# Patient Record
Sex: FEMALE | Race: WHITE | NOT HISPANIC OR LATINO | Employment: OTHER | URBAN - METROPOLITAN AREA
[De-identification: names, ages, dates, MRNs, and addresses within clinical notes are randomized per-mention and may not be internally consistent; named-entity substitution may affect disease eponyms.]

---

## 2017-02-08 ENCOUNTER — ALLSCRIPTS OFFICE VISIT (OUTPATIENT)
Dept: OTHER | Facility: OTHER | Age: 57
End: 2017-02-08

## 2017-09-08 ENCOUNTER — OFFICE VISIT (OUTPATIENT)
Dept: URGENT CARE | Facility: CLINIC | Age: 57
End: 2017-09-08
Payer: COMMERCIAL

## 2017-09-08 ENCOUNTER — TRANSCRIBE ORDERS (OUTPATIENT)
Dept: URGENT CARE | Facility: CLINIC | Age: 57
End: 2017-09-08

## 2017-09-08 ENCOUNTER — APPOINTMENT (OUTPATIENT)
Dept: LAB | Facility: CLINIC | Age: 57
End: 2017-09-08
Payer: COMMERCIAL

## 2017-09-08 DIAGNOSIS — N39.0 URINARY TRACT INFECTION: ICD-10-CM

## 2017-09-08 PROCEDURE — G0382 LEV 3 HOSP TYPE B ED VISIT: HCPCS

## 2017-09-08 PROCEDURE — 87086 URINE CULTURE/COLONY COUNT: CPT

## 2017-09-08 PROCEDURE — 81002 URINALYSIS NONAUTO W/O SCOPE: CPT

## 2017-09-10 LAB — BACTERIA UR CULT: NORMAL

## 2017-12-07 ENCOUNTER — OFFICE VISIT (OUTPATIENT)
Dept: URGENT CARE | Facility: CLINIC | Age: 57
End: 2017-12-07
Payer: COMMERCIAL

## 2017-12-07 ENCOUNTER — APPOINTMENT (OUTPATIENT)
Dept: LAB | Facility: CLINIC | Age: 57
End: 2017-12-07
Payer: COMMERCIAL

## 2017-12-07 DIAGNOSIS — R30.0 DYSURIA: ICD-10-CM

## 2017-12-07 LAB
BILIRUB UR QL STRIP: NORMAL
CLARITY UR: NORMAL
COLOR UR: YELLOW
GLUCOSE (HISTORICAL): NORMAL
HGB UR QL STRIP.AUTO: NORMAL
KETONES UR STRIP-MCNC: NORMAL MG/DL
LEUKOCYTE ESTERASE UR QL STRIP: NORMAL
NITRITE UR QL STRIP: NORMAL
PH UR STRIP.AUTO: 6 [PH]
PROT UR STRIP-MCNC: NORMAL MG/DL
SP GR UR STRIP.AUTO: 1.02
UROBILINOGEN UR QL STRIP.AUTO: 0.2

## 2017-12-07 PROCEDURE — 81002 URINALYSIS NONAUTO W/O SCOPE: CPT

## 2017-12-07 PROCEDURE — 87077 CULTURE AEROBIC IDENTIFY: CPT

## 2017-12-07 PROCEDURE — 87186 SC STD MICRODIL/AGAR DIL: CPT

## 2017-12-07 PROCEDURE — G0382 LEV 3 HOSP TYPE B ED VISIT: HCPCS

## 2017-12-07 PROCEDURE — 87086 URINE CULTURE/COLONY COUNT: CPT

## 2017-12-09 LAB — BACTERIA UR CULT: ABNORMAL

## 2017-12-19 DIAGNOSIS — R92.2 INCONCLUSIVE MAMMOGRAM: ICD-10-CM

## 2017-12-19 DIAGNOSIS — Z12.31 ENCOUNTER FOR SCREENING MAMMOGRAM FOR MALIGNANT NEOPLASM OF BREAST: ICD-10-CM

## 2017-12-20 ENCOUNTER — GENERIC CONVERSION - ENCOUNTER (OUTPATIENT)
Dept: OBGYN CLINIC | Facility: CLINIC | Age: 57
End: 2017-12-20

## 2018-01-13 VITALS
WEIGHT: 157 LBS | SYSTOLIC BLOOD PRESSURE: 132 MMHG | DIASTOLIC BLOOD PRESSURE: 92 MMHG | HEIGHT: 66 IN | BODY MASS INDEX: 25.23 KG/M2

## 2018-01-23 VITALS
TEMPERATURE: 98.5 F | BODY MASS INDEX: 25.23 KG/M2 | SYSTOLIC BLOOD PRESSURE: 122 MMHG | HEIGHT: 66 IN | WEIGHT: 157 LBS | DIASTOLIC BLOOD PRESSURE: 86 MMHG | HEART RATE: 90 BPM | OXYGEN SATURATION: 96 % | RESPIRATION RATE: 18 BRPM

## 2018-02-06 PROBLEM — N39.0 ACUTE UTI: Status: ACTIVE | Noted: 2017-09-08

## 2018-02-06 PROBLEM — R30.0 DIFFICULT OR PAINFUL URINATION: Status: ACTIVE | Noted: 2017-12-07

## 2018-02-06 PROBLEM — R92.30 DENSE BREASTS: Status: ACTIVE | Noted: 2017-02-08

## 2018-02-06 PROBLEM — R92.2 DENSE BREASTS: Status: ACTIVE | Noted: 2017-02-08

## 2018-02-12 ENCOUNTER — OFFICE VISIT (OUTPATIENT)
Dept: OBGYN CLINIC | Facility: CLINIC | Age: 58
End: 2018-02-12
Payer: COMMERCIAL

## 2018-02-12 VITALS
DIASTOLIC BLOOD PRESSURE: 84 MMHG | WEIGHT: 161 LBS | BODY MASS INDEX: 25.88 KG/M2 | HEIGHT: 66 IN | SYSTOLIC BLOOD PRESSURE: 122 MMHG

## 2018-02-12 DIAGNOSIS — R92.2 DENSE BREAST TISSUE: ICD-10-CM

## 2018-02-12 DIAGNOSIS — Z12.31 ENCOUNTER FOR SCREENING MAMMOGRAM FOR MALIGNANT NEOPLASM OF BREAST: ICD-10-CM

## 2018-02-12 DIAGNOSIS — Z01.419 ENCOUNTER FOR GYNECOLOGICAL EXAMINATION (GENERAL) (ROUTINE) WITHOUT ABNORMAL FINDINGS: Primary | ICD-10-CM

## 2018-02-12 PROCEDURE — 99396 PREV VISIT EST AGE 40-64: CPT | Performed by: OBSTETRICS & GYNECOLOGY

## 2018-02-12 RX ORDER — ROSUVASTATIN CALCIUM 10 MG/1
10 TABLET, COATED ORAL DAILY
COMMUNITY
End: 2021-03-09 | Stop reason: SDUPTHER

## 2018-02-12 RX ORDER — BIOTIN 2500 MCG
CAPSULE ORAL
COMMUNITY

## 2018-02-12 RX ORDER — METOPROLOL SUCCINATE 25 MG/1
TABLET, EXTENDED RELEASE ORAL
Refills: 3 | COMMUNITY
Start: 2017-11-24 | End: 2019-02-27

## 2018-02-12 NOTE — PROGRESS NOTES
Assessment/Plan  Encounter Diagnoses   Name Primary?  Encounter for screening mammogram for malignant neoplasm of breast     Dense breast tissue     Encounter for gynecological examination (general) (routine) without abnormal findings Yes        Diagnoses and all orders for this visit:    Encounter for gynecological examination (general) (routine) without abnormal findings    Encounter for screening mammogram for malignant neoplasm of breast  -     Mammo screening bilateral w 3d & cad; Future    Dense breast tissue  -     Mammo screening bilateral w 3d & cad; Future    Other orders  -     ASPIRIN 81 PO; Take 81 mg by mouth  -     Biotin 2500 MCG CAPS; Take by mouth  -     Calcium Carbonate-Vitamin D (CALTRATE 600+D) 600-400 MG-UNIT per tablet; Take 1 tablet by mouth  -     Cholecalciferol 1000 units tablet; Take 6,000 Units by mouth  -     rosuvastatin (CRESTOR) 10 MG tablet; Take by mouth  -     metoprolol succinate (TOPROL-XL) 25 mg 24 hr tablet; TAKE 1 TABLET BY ORAL ROUTE DAILY  patient is here for annual gyn exam     She has no new problems   she has no menopausal symptoms  Subjective      Angie Bolanos is a 65 y o  female who presents for annual exam    The patient has no complaints today  She feels well  The patient is sexually active  GYN screening history: last pap: was normal    The patient is not taking hormone replacement therapy  Patient denies post-menopausal vaginal bleeding      The patient participates in regular exercise: no  She is safe   The patient is having menopausal symptoms vaginal dryness   She admits to seeing her primary care physician for exams and evaluations  Last mammogram was in 2017, last Pap smear was in 2016  Menstrual History:  OB History     No data available         Menarche age:   No LMP recorded         The following portions of the patient's history were reviewed and updated as appropriate: allergies, current medications, past family history, past medical history, past social history, past surgical history and problem list     Review of Systems  Patient Active Problem List    Diagnosis Date Noted    Difficult or painful urination 12/07/2017    Acute UTI 09/08/2017    Dense breasts 02/08/2017    Hyperlipidemia 11/06/2012    Hypertension 11/06/2012    Vitamin D deficiency 11/06/2012       Social History     Social History    Marital status: /Civil Union     Spouse name: N/A    Number of children: N/A    Years of education: N/A     Occupational History    Not on file       Social History Main Topics    Smoking status: Never Smoker    Smokeless tobacco: Never Used    Alcohol use No    Drug use: No    Sexual activity: Yes     Partners: Male     Birth control/ protection: Male Sterilization     Other Topics Concern    Not on file     Social History Narrative    No narrative on file     Family History   Problem Relation Age of Onset    Bone cancer Mother     Hypertension Mother     Multiple myeloma Mother     Dementia Father     Diabetes Father     Seizures Father     Heart disease Father     Hyperlipidemia Father     Breast cancer Maternal Aunt     Colon cancer Maternal Aunt     Breast cancer Paternal Aunt      Past Medical History:   Diagnosis Date    Abnormal Pap smear of cervix     Colon polyps     Fibroadenoma 1993    Forceps delivery     1994 daughter    Headache     HPV (human papilloma virus) infection     Mild dysplasia of cervix     SVT (supraventricular tachycardia) (HCC)     Uses oral contraceptives        Current Outpatient Prescriptions:     ASPIRIN 81 PO, Take 81 mg by mouth, Disp: , Rfl:     Biotin 2500 MCG CAPS, Take by mouth, Disp: , Rfl:     Calcium Carbonate-Vitamin D (CALTRATE 600+D) 600-400 MG-UNIT per tablet, Take 1 tablet by mouth, Disp: , Rfl:     Cholecalciferol 1000 units tablet, Take 6,000 Units by mouth, Disp: , Rfl:     metoprolol succinate (TOPROL-XL) 25 mg 24 hr tablet, TAKE 1 TABLET BY ORAL ROUTE DAILY  , Disp: , Rfl: 3    rosuvastatin (CRESTOR) 10 MG tablet, Take by mouth, Disp: , Rfl:     Objective      /84   Ht 5' 5 5" (1 664 m)   Wt 73 kg (161 lb)   BMI 26 38 kg/m²     normal nipple, no mass, no nipple discharge  General:   alert and oriented, in no acute distress   Heart: regular rate and rhythm, S1, S2 normal, no murmur, click, rub or gallop   Lungs: clear to auscultation bilaterally   Abdomen: soft, non-tender, without masses or organomegaly   Vulva: normal appearing vulva with no masses, tenderness, or lesions   Vagina: normal mucosa   Cervix: anteverted and no lesions   Uterus: normal size, non-tender   Adnexa: normal adnexa   not indicated and normal rectal, no masses    Lab Review  none        Plan:

## 2018-05-12 ENCOUNTER — OFFICE VISIT (OUTPATIENT)
Dept: URGENT CARE | Facility: CLINIC | Age: 58
End: 2018-05-12
Payer: COMMERCIAL

## 2018-05-12 VITALS
OXYGEN SATURATION: 98 % | SYSTOLIC BLOOD PRESSURE: 141 MMHG | TEMPERATURE: 97.8 F | HEART RATE: 79 BPM | DIASTOLIC BLOOD PRESSURE: 83 MMHG

## 2018-05-12 DIAGNOSIS — N30.00 ACUTE CYSTITIS WITHOUT HEMATURIA: Primary | ICD-10-CM

## 2018-05-12 LAB
SL AMB  POCT GLUCOSE, UA: ABNORMAL
SL AMB LEUKOCYTE ESTERASE,UA: ABNORMAL
SL AMB POCT BILIRUBIN,UA: NEGATIVE
SL AMB POCT BLOOD,UA: ABNORMAL
SL AMB POCT CLARITY,UA: ABNORMAL
SL AMB POCT COLOR,UA: ABNORMAL
SL AMB POCT KETONES,UA: NEGATIVE
SL AMB POCT NITRITE,UA: NEGATIVE
SL AMB POCT PH,UA: 5
SL AMB POCT SPECIFIC GRAVITY,UA: 1.02
SL AMB POCT URINE PROTEIN: 30
SL AMB POCT UROBILINOGEN: 0.2

## 2018-05-12 PROCEDURE — G0382 LEV 3 HOSP TYPE B ED VISIT: HCPCS | Performed by: PHYSICIAN ASSISTANT

## 2018-05-12 PROCEDURE — 87086 URINE CULTURE/COLONY COUNT: CPT | Performed by: PHYSICIAN ASSISTANT

## 2018-05-12 PROCEDURE — 81002 URINALYSIS NONAUTO W/O SCOPE: CPT | Performed by: PHYSICIAN ASSISTANT

## 2018-05-12 RX ORDER — SULFAMETHOXAZOLE AND TRIMETHOPRIM 800; 160 MG/1; MG/1
1 TABLET ORAL 2 TIMES DAILY
Qty: 14 TABLET | Refills: 0 | Status: SHIPPED | OUTPATIENT
Start: 2018-05-12 | End: 2018-05-19

## 2018-05-12 NOTE — PATIENT INSTRUCTIONS
Urinary Tract Infection in Women   AMBULATORY CARE:   A urinary tract infection (UTI)  is caused by bacteria that get inside your urinary tract  Most bacteria that enter your urinary tract come out when you urinate  If the bacteria stay in your urinary tract, you may get an infection  Your urinary tract includes your kidneys, ureters, bladder, and urethra  Urine is made in your kidneys, and it flows from the ureters to the bladder  Urine leaves the bladder through the urethra  A UTI is more common in your lower urinary tract, which includes your bladder and urethra  Common symptoms include the following:   · Urinating more often or waking from sleep to urinate    · Pain or burning when you urinate    · Pain or pressure in your lower abdomen     · Urine that smells bad    · Blood in your urine    · Leaking urine  Seek care immediately if:   · You are urinating very little or not at all  · You have a high fever with shaking chills  · You have side or back pain that gets worse  Contact your healthcare provider if:   · You have a fever  · You do not feel better after 2 days of taking antibiotics  · You are vomiting  · You have questions or concerns about your condition or care  Treatment for a UTI  may include medicines to treat a bacterial infection  You may also need medicines to decrease pain and burning, or decrease the urge to urinate often  Prevent a UTI:   · Empty your bladder often  Urinate and empty your bladder as soon as you feel the need  Do not hold your urine for long periods of time  · Wipe from front to back after you urinate or have a bowel movement  This will help prevent germs from getting into your urinary tract through your urethra  · Drink liquids as directed  Ask how much liquid to drink each day and which liquids are best for you  You may need to drink more liquids than usual to help flush out the bacteria  Do not drink alcohol, caffeine, or citrus juices  These can irritate your bladder and increase your symptoms  Your healthcare provider may recommend cranberry juice to help prevent a UTI  · Urinate after you have sex  This can help flush out bacteria passed during sex  · Do not douche or use feminine deodorants  These can change the chemical balance in your vagina  · Change sanitary pads or tampons often  This will help prevent germs from getting into your urinary tract  · Do pelvic muscle exercises often  Pelvic muscle exercises may help you start and stop urinating  Strong pelvic muscles may help you empty your bladder easier  Squeeze these muscles tightly for 5 seconds like you are trying to hold back urine  Then relax for 5 seconds  Gradually work up to squeezing for 10 seconds  Do 3 sets of 15 repetitions a day, or as directed  Follow up with your healthcare provider as directed:  Write down your questions so you remember to ask them during your visits  © 2017 [address] Information is for End User's use only and may not be sold, redistributed or otherwise used for commercial purposes  All illustrations and images included in CareNotes® are the copyrighted property of [employer]  or [employer]  The above information is an  only  It is not intended as medical advice for individual conditions or treatments  Talk to your doctor, nurse or pharmacist before following any medical regimen to see if it is safe and effective for you

## 2018-05-12 NOTE — PROGRESS NOTES
[facility]        NAME: Franny Ge is a 65 y o  female  : 1960    MRN: [id number]  DATE: May 12, 2018  TIME: 8:33 AM    Assessment and Plan   Acute cystitis without hematuria [N30 00]  1  Acute cystitis without hematuria  sulfamethoxazole-trimethoprim (BACTRIM DS) 800-160 mg per tablet         Patient Instructions       Follow up with PCP in 3-5 days  Proceed to  ER if symptoms worsen  Chief Complaint     Chief Complaint   Patient presents with    Possible UTI     noticed burning last night "this isnt my first rodeo with this"         History of Present Illness       Patient started with burning on urination last night  She is also having slight frequency no urgency  He states is typical when she starts a urinary tract infection  Patient denies any abdominal or flank pain fever chills  Patient states she took Bactrim last time which worked very well  She has not known any bacteria resistant to any specific antibiotics  Review of Systems   Review of Systems   Constitutional: Negative for chills and fever  HENT: Negative for rhinorrhea and sore throat  Eyes: Negative for redness  Respiratory: Negative for cough, shortness of breath and wheezing  Cardiovascular: Negative for chest pain  Gastrointestinal: Negative for abdominal pain  Genitourinary: Positive for dysuria and frequency  Negative for decreased urine volume, difficulty urinating, flank pain, hematuria, urgency and vaginal discharge  Musculoskeletal: Negative for myalgias  Neurological: Negative for dizziness and headaches  Hematological: Negative for adenopathy           Current Medications       Current Outpatient Prescriptions:     ASPIRIN 81 PO, Take 81 mg by mouth, Disp: , Rfl:     Biotin 2500 MCG CAPS, Take by mouth, Disp: , Rfl:     Calcium Carbonate-Vitamin D (CALTRATE 600+D) 600-400 MG-UNIT per tablet, Take 1 tablet by mouth, Disp: , Rfl:     Cholecalciferol 1000 units tablet, Take 6,000 Units by mouth, Disp: , Rfl:     metoprolol succinate (TOPROL-XL) 25 mg 24 hr tablet, TAKE 1 TABLET BY ORAL ROUTE DAILY  , Disp: , Rfl: 3    rosuvastatin (CRESTOR) 10 MG tablet, Take by mouth, Disp: , Rfl:     sulfamethoxazole-trimethoprim (BACTRIM DS) 800-160 mg per tablet, Take 1 tablet by mouth 2 (two) times a day for 7 days, Disp: 14 tablet, Rfl: 0    Current Allergies     Allergies as of 05/12/2018    (No Known Allergies)            The following portions of the patient's history were reviewed and updated as appropriate: allergies, current medications, past family history, past medical history, past social history, past surgical history and problem list      Past Medical History:   Diagnosis Date    Abnormal Pap smear of cervix     Colon polyps     Fibroadenoma 1993    Forceps delivery     1994 daughter    Headache     HPV (human papilloma virus) infection     Hyperlipemia     Hypertension     Mild dysplasia of cervix     SVT (supraventricular tachycardia) ([facility] [address] )     Urinary tract infection     Uses oral contraceptives        Past Surgical History:   Procedure Laterality Date    AV NODE ABLATION  2016    BREAST EXCISIONAL BIOPSY Right 1998    BREAST LUMPECTOMY Left 1981    CERVICAL CONE BIOPSY  1988    COLONOSCOPY  2013       Family History   Problem Relation Age of Onset    Bone cancer Mother     Hypertension Mother     Multiple myeloma Mother     Dementia Father     Diabetes Father     Seizures Father     Heart disease Father     Hyperlipidemia Father     Breast cancer Maternal Aunt     Colon cancer Maternal Aunt     Breast cancer Paternal Aunt          Medications have been verified  Objective   /83   Pulse 79   Temp 97 8 °F (36 6 °C) (Tympanic)   SpO2 98%        Physical Exam     Physical Exam   Constitutional: She is oriented to person, place, and time  She appears well-developed and well-nourished  HENT:   Head: Normocephalic and atraumatic     Eyes: Conjunctivae are normal    Neck: Normal range of motion  Cardiovascular: Normal rate and regular rhythm  Pulmonary/Chest: Effort normal    Abdominal: Soft  She exhibits no mass  There is no tenderness  No CVA tenderness   Neurological: She is alert and oriented to person, place, and time  Skin: Skin is warm and dry  Psychiatric: She has a normal mood and affect  Her behavior is normal  Judgment and thought content normal    Nursing note and vitals reviewed

## 2018-05-13 LAB — BACTERIA UR CULT: NORMAL

## 2018-05-17 ENCOUNTER — TELEPHONE (OUTPATIENT)
Dept: OBGYN CLINIC | Facility: CLINIC | Age: 58
End: 2018-05-17

## 2018-05-17 DIAGNOSIS — R10.2 PELVIC PAIN: Primary | ICD-10-CM

## 2018-05-17 NOTE — TELEPHONE ENCOUNTER
Spoke with pt - for the past 2 months, she has been having severe pelvic pain - sometimes it radiates to her hips  Sometimes that pain lets up a little when she take Advil that she is able to sleep but at times it dose not  No bleeding, but feels bloated  Was recently treated for a UTI, but states she had this before she got that  She would like an U/S  Ok to order?  Please

## 2018-05-17 NOTE — TELEPHONE ENCOUNTER
Pt called - currently having pelvic pain / discomfort - been happening for the past 2 mths - lower pelvis area - some times not as bad - but times where she can't sleep due to it - last 3-4 days - then goes away - but comes back - please advise  Home (1st)  [phone]  If no answer call    Cell  [phone]

## 2018-05-18 NOTE — TELEPHONE ENCOUNTER
Spoke with Pt today via phone call  Pt informed that radiology order for US pelvis complete w transvaginal completed in EPIC per Dr Panda Paris directive  Copy of said order mailed to Pt's mailing address listed in Pt's EHR per Pt's request   Reiterated to Pt that if she has any questions/concerns, to contact office

## 2018-05-18 NOTE — TELEPHONE ENCOUNTER
Carlos Manuel Rea MD   [facility] 19 minutes ago (8:13 AM)      Ok to order US (Routing comment)

## 2018-05-21 ENCOUNTER — HOSPITAL ENCOUNTER (OUTPATIENT)
Dept: ULTRASOUND IMAGING | Facility: HOSPITAL | Age: 58
Discharge: HOME | End: 2018-05-21
Payer: COMMERCIAL

## 2018-05-21 ENCOUNTER — TRANSCRIBE ORDERS (OUTPATIENT)
Dept: ADMINISTRATIVE | Facility: HOSPITAL | Age: 58
End: 2018-05-21

## 2018-05-21 DIAGNOSIS — R10.2 PELVIC PAIN: ICD-10-CM

## 2018-05-21 PROCEDURE — 76856 US EXAM PELVIC COMPLETE: CPT

## 2018-05-21 PROCEDURE — 76830 TRANSVAGINAL US NON-OB: CPT

## 2018-06-29 ENCOUNTER — APPOINTMENT (OUTPATIENT)
Dept: LAB | Facility: MEDICAL CENTER | Age: 58
End: 2018-06-29
Payer: COMMERCIAL

## 2018-06-29 ENCOUNTER — TRANSCRIBE ORDERS (OUTPATIENT)
Dept: LAB | Facility: MEDICAL CENTER | Age: 58
End: 2018-06-29

## 2018-06-29 DIAGNOSIS — M89.9 BONE DISEASE: ICD-10-CM

## 2018-06-29 DIAGNOSIS — I10 HYPERTENSION, UNSPECIFIED TYPE: ICD-10-CM

## 2018-06-29 DIAGNOSIS — R73.03 PREDIABETES: ICD-10-CM

## 2018-06-29 DIAGNOSIS — I49.9 CARDIAC ARRHYTHMIA, UNSPECIFIED CARDIAC ARRHYTHMIA TYPE: Primary | ICD-10-CM

## 2018-06-29 DIAGNOSIS — E78.5 HYPERLIPIDEMIA, UNSPECIFIED HYPERLIPIDEMIA TYPE: ICD-10-CM

## 2018-06-29 DIAGNOSIS — I49.9 CARDIAC ARRHYTHMIA, UNSPECIFIED CARDIAC ARRHYTHMIA TYPE: ICD-10-CM

## 2018-06-29 LAB
25(OH)D3 SERPL-MCNC: 46 NG/ML (ref 30–100)
ALBUMIN SERPL BCP-MCNC: 4 G/DL (ref 3.5–5)
ALP SERPL-CCNC: 74 U/L (ref 46–116)
ALT SERPL W P-5'-P-CCNC: 35 U/L (ref 12–78)
ANION GAP SERPL CALCULATED.3IONS-SCNC: 7 MMOL/L (ref 4–13)
AST SERPL W P-5'-P-CCNC: 17 U/L (ref 5–45)
BILIRUB SERPL-MCNC: 0.48 MG/DL (ref 0.2–1)
BUN SERPL-MCNC: 13 MG/DL (ref 5–25)
CALCIUM SERPL-MCNC: 9.6 MG/DL (ref 8.3–10.1)
CHLORIDE SERPL-SCNC: 107 MMOL/L (ref 100–108)
CHOLEST SERPL-MCNC: 209 MG/DL (ref 50–200)
CO2 SERPL-SCNC: 27 MMOL/L (ref 21–32)
CREAT SERPL-MCNC: 0.66 MG/DL (ref 0.6–1.3)
ERYTHROCYTE [DISTWIDTH] IN BLOOD BY AUTOMATED COUNT: 12.3 % (ref 11.6–15.1)
EST. AVERAGE GLUCOSE BLD GHB EST-MCNC: 131 MG/DL
GFR SERPL CREATININE-BSD FRML MDRD: 99 ML/MIN/1.73SQ M
GLUCOSE P FAST SERPL-MCNC: 116 MG/DL (ref 65–99)
HBA1C MFR BLD: 6.2 % (ref 4.2–6.3)
HCT VFR BLD AUTO: 41.6 % (ref 34.8–46.1)
HDLC SERPL-MCNC: 37 MG/DL (ref 40–60)
HGB BLD-MCNC: 13.6 G/DL (ref 11.5–15.4)
LDLC SERPL CALC-MCNC: 115 MG/DL (ref 0–100)
MCH RBC QN AUTO: 30.4 PG (ref 26.8–34.3)
MCHC RBC AUTO-ENTMCNC: 32.7 G/DL (ref 31.4–37.4)
MCV RBC AUTO: 93 FL (ref 82–98)
NONHDLC SERPL-MCNC: 172 MG/DL
PLATELET # BLD AUTO: 304 THOUSANDS/UL (ref 149–390)
PMV BLD AUTO: 11.4 FL (ref 8.9–12.7)
POTASSIUM SERPL-SCNC: 4.4 MMOL/L (ref 3.5–5.3)
PROT SERPL-MCNC: 8 G/DL (ref 6.4–8.2)
RBC # BLD AUTO: 4.47 MILLION/UL (ref 3.81–5.12)
SODIUM SERPL-SCNC: 141 MMOL/L (ref 136–145)
TRIGL SERPL-MCNC: 283 MG/DL
TSH SERPL DL<=0.05 MIU/L-ACNC: 1.31 UIU/ML (ref 0.36–3.74)
WBC # BLD AUTO: 6.76 THOUSAND/UL (ref 4.31–10.16)

## 2018-06-29 PROCEDURE — 82306 VITAMIN D 25 HYDROXY: CPT

## 2018-06-29 PROCEDURE — 80053 COMPREHEN METABOLIC PANEL: CPT

## 2018-06-29 PROCEDURE — 85027 COMPLETE CBC AUTOMATED: CPT

## 2018-06-29 PROCEDURE — 83036 HEMOGLOBIN GLYCOSYLATED A1C: CPT

## 2018-06-29 PROCEDURE — 80061 LIPID PANEL: CPT

## 2018-06-29 PROCEDURE — 84443 ASSAY THYROID STIM HORMONE: CPT

## 2018-06-29 PROCEDURE — 36415 COLL VENOUS BLD VENIPUNCTURE: CPT

## 2018-08-22 ENCOUNTER — TELEPHONE (OUTPATIENT)
Dept: OBGYN CLINIC | Facility: CLINIC | Age: 58
End: 2018-08-22

## 2018-08-22 DIAGNOSIS — Z12.31 ENCOUNTER FOR SCREENING MAMMOGRAM FOR MALIGNANT NEOPLASM OF BREAST: Primary | ICD-10-CM

## 2018-08-22 NOTE — TELEPHONE ENCOUNTER
Pt called in regards to [facility] script  Pt has script for a mammo but needs the dr ricardo   [facility] Please mail to pt

## 2018-09-02 ENCOUNTER — OFFICE VISIT (OUTPATIENT)
Dept: URGENT CARE | Facility: CLINIC | Age: 58
End: 2018-09-02
Payer: COMMERCIAL

## 2018-09-02 VITALS
OXYGEN SATURATION: 96 % | HEART RATE: 80 BPM | SYSTOLIC BLOOD PRESSURE: 156 MMHG | RESPIRATION RATE: 18 BRPM | DIASTOLIC BLOOD PRESSURE: 78 MMHG | TEMPERATURE: 98.4 F

## 2018-09-02 DIAGNOSIS — R35.0 URINARY FREQUENCY: ICD-10-CM

## 2018-09-02 DIAGNOSIS — N30.00 ACUTE CYSTITIS WITHOUT HEMATURIA: Primary | ICD-10-CM

## 2018-09-02 LAB
SL AMB  POCT GLUCOSE, UA: ABNORMAL
SL AMB LEUKOCYTE ESTERASE,UA: ABNORMAL
SL AMB POCT BILIRUBIN,UA: ABNORMAL
SL AMB POCT BLOOD,UA: ABNORMAL
SL AMB POCT CLARITY,UA: CLEAR
SL AMB POCT COLOR,UA: YELLOW
SL AMB POCT KETONES,UA: ABNORMAL
SL AMB POCT NITRITE,UA: ABNORMAL
SL AMB POCT PH,UA: 7
SL AMB POCT SPECIFIC GRAVITY,UA: 1.01
SL AMB POCT URINE PROTEIN: ABNORMAL
SL AMB POCT UROBILINOGEN: 0.2

## 2018-09-02 PROCEDURE — 81002 URINALYSIS NONAUTO W/O SCOPE: CPT | Performed by: PHYSICIAN ASSISTANT

## 2018-09-02 PROCEDURE — 87086 URINE CULTURE/COLONY COUNT: CPT | Performed by: PHYSICIAN ASSISTANT

## 2018-09-02 PROCEDURE — G0382 LEV 3 HOSP TYPE B ED VISIT: HCPCS | Performed by: PHYSICIAN ASSISTANT

## 2018-09-02 RX ORDER — NITROFURANTOIN 25; 75 MG/1; MG/1
100 CAPSULE ORAL 2 TIMES DAILY
Qty: 14 CAPSULE | Refills: 0 | Status: SHIPPED | OUTPATIENT
Start: 2018-09-02 | End: 2018-09-09

## 2018-09-02 NOTE — PROGRESS NOTES
[facility]        NAME: Oleg Strong is a 65 y o  female  : 1960    MRN: [id number]  DATE: 2018  TIME: 12:52 PM    Assessment and Plan   Acute cystitis without hematuria [N30 00]  1  Acute cystitis without hematuria  nitrofurantoin (MACROBID) 100 mg capsule   2  Urinary frequency  POCT urine dip    Urine culture         Patient Instructions     Suggest following up with urologist   Follow up with PCP in 3-5 days  Proceed to  ER if symptoms worsen  Chief Complaint     Chief Complaint   Patient presents with    Urinary Frequency     Pt c/o urianry frequency and burning upon urination since this morning  History of Present Illness       Patient presents with a 2 day history of burning on urination urinary pressure and frequency  She had last UTI was several months ago  Denies any fever chills or back pain  Review of Systems   Review of Systems   Constitutional: Negative for chills and fever  HENT: Negative for sore throat  Eyes: Negative for redness  Respiratory: Negative for cough  Cardiovascular: Negative for chest pain  Gastrointestinal: Negative for abdominal pain, diarrhea, nausea and vomiting  Genitourinary: Positive for dysuria and frequency  Negative for difficulty urinating, flank pain, hematuria and vaginal pain  Musculoskeletal: Negative for arthralgias  Skin: Negative for rash  Neurological: Negative for headaches  Hematological: Negative for adenopathy  Current Medications       Current Outpatient Prescriptions:     ASPIRIN 81 PO, Take 81 mg by mouth, Disp: , Rfl:     Biotin 2500 MCG CAPS, Take by mouth, Disp: , Rfl:     Calcium Carbonate-Vitamin D (CALTRATE 600+D) 600-400 MG-UNIT per tablet, Take 1 tablet by mouth, Disp: , Rfl:     Cholecalciferol 1000 units tablet, Take 6,000 Units by mouth, Disp: , Rfl:     metoprolol succinate (TOPROL-XL) 25 mg 24 hr tablet, TAKE 1 TABLET BY ORAL ROUTE DAILY  , Disp: , Rfl: 3   nitrofurantoin (MACROBID) 100 mg capsule, Take 1 capsule (100 mg total) by mouth 2 (two) times a day for 7 days, Disp: 14 capsule, Rfl: 0    rosuvastatin (CRESTOR) 10 MG tablet, Take by mouth, Disp: , Rfl:     Current Allergies     Allergies as of 09/02/2018    (No Known Allergies)            The following portions of the patient's history were reviewed and updated as appropriate: allergies, current medications, past family history, past medical history, past social history, past surgical history and problem list      Past Medical History:   Diagnosis Date    Abnormal Pap smear of cervix     Colon polyps     Fibroadenoma 1993    Forceps delivery     1994 daughter    Headache     HPV (human papilloma virus) infection     Hyperlipemia     Hypertension     Mild dysplasia of cervix     SVT [facility]ricular tachycardia) ([facility] [address]     Urinary tract infection     Uses oral contraceptives        Past Surgical History:   Procedure Laterality Date    AV NODE ABLATION  2016    BREAST EXCISIONAL BIOPSY Right 1998    BREAST LUMPECTOMY Left 1981    CERVICAL CONE BIOPSY  1988    COLONOSCOPY  2013       Family History   Problem Relation Age of Onset    Bone cancer Mother     Hypertension Mother     Multiple myeloma Mother     Dementia Father     Diabetes Father     Seizures Father     Heart disease Father     Hyperlipidemia Father     Breast cancer Maternal Aunt     Colon cancer Maternal Aunt     Breast cancer Paternal Aunt          Medications have been verified  Objective   /78   Pulse 80   Temp 98 4 °F (36 9 °C)   Resp 18   SpO2 96%        Physical Exam     Physical Exam   Constitutional: She is oriented to person, place, and time  She appears well-developed and well-nourished  HENT:   Head: Normocephalic and atraumatic  Eyes: Conjunctivae are normal    Neck: Normal range of motion  Cardiovascular: Normal rate and regular rhythm      Pulmonary/Chest: Effort normal    Abdominal: Soft  She exhibits no mass  There is no tenderness  No CVA tenderness   Neurological: She is alert and oriented to person, place, and time  Skin: Skin is warm and dry  No rash noted  Psychiatric: She has a normal mood and affect   Her behavior is normal  Thought content normal

## 2018-09-02 NOTE — PATIENT INSTRUCTIONS
Urinary Tract Infection in Women   AMBULATORY CARE:   A urinary tract infection (UTI)  is caused by bacteria that get inside your urinary tract  Most bacteria that enter your urinary tract come out when you urinate  If the bacteria stay in your urinary tract, you may get an infection  Your urinary tract includes your kidneys, ureters, bladder, and urethra  Urine is made in your kidneys, and it flows from the ureters to the bladder  Urine leaves the bladder through the urethra  A UTI is more common in your lower urinary tract, which includes your bladder and urethra  Common symptoms include the following:   · Urinating more often or waking from sleep to urinate    · Pain or burning when you urinate    · Pain or pressure in your lower abdomen     · Urine that smells bad    · Blood in your urine    · Leaking urine  Seek care immediately if:   · You are urinating very little or not at all  · You have a high fever with shaking chills  · You have side or back pain that gets worse  Contact your healthcare provider if:   · You have a fever  · You do not feel better after 2 days of taking antibiotics  · You are vomiting  · You have questions or concerns about your condition or care  Treatment for a UTI  may include medicines to treat a bacterial infection  You may also need medicines to decrease pain and burning, or decrease the urge to urinate often  Prevent a UTI:   · Empty your bladder often  Urinate and empty your bladder as soon as you feel the need  Do not hold your urine for long periods of time  · Wipe from front to back after you urinate or have a bowel movement  This will help prevent germs from getting into your urinary tract through your urethra  · Drink liquids as directed  Ask how much liquid to drink each day and which liquids are best for you  You may need to drink more liquids than usual to help flush out the bacteria  Do not drink alcohol, caffeine, or citrus juices  These can irritate your bladder and increase your symptoms  Your healthcare provider may recommend cranberry juice to help prevent a UTI  · Urinate after you have sex  This can help flush out bacteria passed during sex  · Do not douche or use feminine deodorants  These can change the chemical balance in your vagina  · Change sanitary pads or tampons often  This will help prevent germs from getting into your urinary tract  · Do pelvic muscle exercises often  Pelvic muscle exercises may help you start and stop urinating  Strong pelvic muscles may help you empty your bladder easier  Squeeze these muscles tightly for 5 seconds like you are trying to hold back urine  Then relax for 5 seconds  Gradually work up to squeezing for 10 seconds  Do 3 sets of 15 repetitions a day, or as directed  Follow up with your healthcare provider as directed:  Write down your questions so you remember to ask them during your visits  © 2017 [address] [employer] Information is for End User's use only and may not be sold, redistributed or otherwise used for commercial purposes  All illustrations and images included in CareNotes® are the copyrighted property of [employer]  or [employer]  The above information is an  only  It is not intended as medical advice for individual conditions or treatments  Talk to your doctor, nurse or pharmacist before following any medical regimen to see if it is safe and effective for you

## 2018-09-03 LAB — BACTERIA UR CULT: NORMAL

## 2018-10-29 ENCOUNTER — TRANSCRIBE ORDERS (OUTPATIENT)
Dept: LAB | Facility: MEDICAL CENTER | Age: 58
End: 2018-10-29

## 2018-10-29 ENCOUNTER — APPOINTMENT (OUTPATIENT)
Dept: LAB | Facility: MEDICAL CENTER | Age: 58
End: 2018-10-29
Payer: COMMERCIAL

## 2018-10-29 DIAGNOSIS — R73.03 PREDIABETES: ICD-10-CM

## 2018-10-29 DIAGNOSIS — E78.5 HYPERLIPIDEMIA, UNSPECIFIED HYPERLIPIDEMIA TYPE: ICD-10-CM

## 2018-10-29 DIAGNOSIS — E78.5 HYPERLIPIDEMIA, UNSPECIFIED HYPERLIPIDEMIA TYPE: Primary | ICD-10-CM

## 2018-10-29 LAB
CHOLEST SERPL-MCNC: 152 MG/DL (ref 50–200)
EST. AVERAGE GLUCOSE BLD GHB EST-MCNC: 123 MG/DL
HBA1C MFR BLD: 5.9 % (ref 4.2–6.3)
HDLC SERPL-MCNC: 36 MG/DL (ref 40–60)
LDLC SERPL CALC-MCNC: 71 MG/DL (ref 0–100)
NONHDLC SERPL-MCNC: 116 MG/DL
TRIGL SERPL-MCNC: 224 MG/DL

## 2018-10-29 PROCEDURE — 83036 HEMOGLOBIN GLYCOSYLATED A1C: CPT

## 2018-10-29 PROCEDURE — 80061 LIPID PANEL: CPT

## 2018-10-29 PROCEDURE — 36415 COLL VENOUS BLD VENIPUNCTURE: CPT

## 2018-11-16 ENCOUNTER — APPOINTMENT (OUTPATIENT)
Dept: LAB | Facility: MEDICAL CENTER | Age: 58
End: 2018-11-16
Payer: COMMERCIAL

## 2018-11-16 ENCOUNTER — TRANSCRIBE ORDERS (OUTPATIENT)
Dept: LAB | Facility: MEDICAL CENTER | Age: 58
End: 2018-11-16

## 2018-11-16 DIAGNOSIS — R31.9 URINARY TRACT INFECTION WITH HEMATURIA, SITE UNSPECIFIED: Primary | ICD-10-CM

## 2018-11-16 DIAGNOSIS — N39.0 URINARY TRACT INFECTION WITH HEMATURIA, SITE UNSPECIFIED: ICD-10-CM

## 2018-11-16 DIAGNOSIS — R31.9 URINARY TRACT INFECTION WITH HEMATURIA, SITE UNSPECIFIED: ICD-10-CM

## 2018-11-16 DIAGNOSIS — N39.0 URINARY TRACT INFECTION WITH HEMATURIA, SITE UNSPECIFIED: Primary | ICD-10-CM

## 2018-11-16 PROCEDURE — 87186 SC STD MICRODIL/AGAR DIL: CPT

## 2018-11-16 PROCEDURE — 87077 CULTURE AEROBIC IDENTIFY: CPT

## 2018-11-16 PROCEDURE — 87086 URINE CULTURE/COLONY COUNT: CPT

## 2018-11-18 LAB
BACTERIA UR CULT: ABNORMAL
BACTERIA UR CULT: ABNORMAL

## 2018-11-30 ENCOUNTER — TRANSCRIBE ORDERS (OUTPATIENT)
Dept: LAB | Facility: MEDICAL CENTER | Age: 58
End: 2018-11-30

## 2018-11-30 ENCOUNTER — APPOINTMENT (OUTPATIENT)
Dept: LAB | Facility: MEDICAL CENTER | Age: 58
End: 2018-11-30
Payer: COMMERCIAL

## 2018-11-30 DIAGNOSIS — N39.0 URINARY TRACT INFECTION WITH HEMATURIA, SITE UNSPECIFIED: Primary | ICD-10-CM

## 2018-11-30 DIAGNOSIS — N39.0 URINARY TRACT INFECTION WITH HEMATURIA, SITE UNSPECIFIED: ICD-10-CM

## 2018-11-30 DIAGNOSIS — R31.9 URINARY TRACT INFECTION WITH HEMATURIA, SITE UNSPECIFIED: Primary | ICD-10-CM

## 2018-11-30 DIAGNOSIS — R31.9 URINARY TRACT INFECTION WITH HEMATURIA, SITE UNSPECIFIED: ICD-10-CM

## 2018-11-30 LAB
BACTERIA UR QL AUTO: ABNORMAL /HPF
BILIRUB UR QL STRIP: NEGATIVE
CLARITY UR: CLEAR
COLOR UR: YELLOW
GLUCOSE UR STRIP-MCNC: NEGATIVE MG/DL
HGB UR QL STRIP.AUTO: NEGATIVE
HYALINE CASTS #/AREA URNS LPF: ABNORMAL /LPF
KETONES UR STRIP-MCNC: NEGATIVE MG/DL
LEUKOCYTE ESTERASE UR QL STRIP: ABNORMAL
NITRITE UR QL STRIP: NEGATIVE
NON-SQ EPI CELLS URNS QL MICRO: ABNORMAL /HPF
PH UR STRIP.AUTO: 6.5 [PH] (ref 4.5–8)
PROT UR STRIP-MCNC: NEGATIVE MG/DL
RBC #/AREA URNS AUTO: ABNORMAL /HPF
SP GR UR STRIP.AUTO: 1.02 (ref 1–1.03)
UROBILINOGEN UR QL STRIP.AUTO: 0.2 E.U./DL
WBC #/AREA URNS AUTO: ABNORMAL /HPF

## 2018-11-30 PROCEDURE — 87147 CULTURE TYPE IMMUNOLOGIC: CPT

## 2018-11-30 PROCEDURE — 87086 URINE CULTURE/COLONY COUNT: CPT

## 2018-11-30 PROCEDURE — 81001 URINALYSIS AUTO W/SCOPE: CPT

## 2018-12-02 LAB
BACTERIA UR CULT: ABNORMAL
BACTERIA UR CULT: ABNORMAL

## 2019-02-20 ENCOUNTER — TRANSCRIBE ORDERS (OUTPATIENT)
Dept: LAB | Facility: MEDICAL CENTER | Age: 59
End: 2019-02-20

## 2019-02-20 ENCOUNTER — APPOINTMENT (OUTPATIENT)
Dept: LAB | Facility: MEDICAL CENTER | Age: 59
End: 2019-02-20
Payer: COMMERCIAL

## 2019-02-20 DIAGNOSIS — R30.0 DYSURIA: Primary | ICD-10-CM

## 2019-02-20 PROCEDURE — 87086 URINE CULTURE/COLONY COUNT: CPT

## 2019-02-20 PROCEDURE — 87186 SC STD MICRODIL/AGAR DIL: CPT

## 2019-02-20 PROCEDURE — 87077 CULTURE AEROBIC IDENTIFY: CPT

## 2019-02-22 LAB — BACTERIA UR CULT: ABNORMAL

## 2019-02-27 ENCOUNTER — ANNUAL EXAM (OUTPATIENT)
Dept: OBGYN CLINIC | Facility: CLINIC | Age: 59
End: 2019-02-27
Payer: COMMERCIAL

## 2019-02-27 VITALS — BODY MASS INDEX: 25.56 KG/M2 | DIASTOLIC BLOOD PRESSURE: 84 MMHG | WEIGHT: 156 LBS | SYSTOLIC BLOOD PRESSURE: 130 MMHG

## 2019-02-27 DIAGNOSIS — Z12.39 SCREENING FOR MALIGNANT NEOPLASM OF BREAST: ICD-10-CM

## 2019-02-27 DIAGNOSIS — Z01.419 ENCOUNTER FOR GYNECOLOGICAL EXAMINATION (GENERAL) (ROUTINE) WITHOUT ABNORMAL FINDINGS: Primary | ICD-10-CM

## 2019-02-27 PROBLEM — N39.0 ACUTE UTI: Status: RESOLVED | Noted: 2017-09-08 | Resolved: 2019-02-27

## 2019-02-27 PROBLEM — R92.30 DENSE BREASTS: Status: RESOLVED | Noted: 2017-02-08 | Resolved: 2019-02-27

## 2019-02-27 PROBLEM — R92.2 DENSE BREASTS: Status: RESOLVED | Noted: 2017-02-08 | Resolved: 2019-02-27

## 2019-02-27 PROBLEM — R30.0 DIFFICULT OR PAINFUL URINATION: Status: RESOLVED | Noted: 2017-12-07 | Resolved: 2019-02-27

## 2019-02-27 PROCEDURE — 99396 PREV VISIT EST AGE 40-64: CPT | Performed by: PHYSICIAN ASSISTANT

## 2019-02-27 RX ORDER — LEVOFLOXACIN 500 MG/1
500 TABLET, FILM COATED ORAL DAILY
Refills: 0 | COMMUNITY
Start: 2019-02-20 | End: 2019-12-26

## 2019-02-27 RX ORDER — METOPROLOL SUCCINATE 50 MG/1
50 TABLET, EXTENDED RELEASE ORAL DAILY
Refills: 5 | COMMUNITY
Start: 2019-01-10 | End: 2019-07-17 | Stop reason: SDUPTHER

## 2019-02-27 NOTE — PROGRESS NOTES
Assessment/Plan  Problem List Items Addressed This Visit        Other    Encounter for gynecological examination (general) (routine) without abnormal findings - Primary     Annual exam performed  mammo rx given  RTO 1 year           Other Visit Diagnoses     Screening for malignant neoplasm of breast        Relevant Orders    Mammo screening bilateral w cad        Subjective   Pancho Amador is a 63 y o  female who presents for annual GYN exam  She denies any changes in Medical, Surgical or Family histories  No menses, and she denies postmenopausal bleeding, spotting, or discharge  She reports that she is sexually active with 1 partner  She denies any pain or dryness with intercourse  Last Pap smear: 2016  Regular self breast exam: yes  Last mammogram: 2018  Last Colonoscopy: 2016  Family history of uterine or ovarian cancer: yes - maternal aunt - ovarian  Family history of breast cancer: yes - maternal aunt and paternal aunt  Family history of colon cancer: yes - maternal aunt x 2    Menstrual History:  OB History        1    Para   1    Term                AB        Living           SAB        TAB        Ectopic        Multiple        Live Births                    No LMP recorded  Patient is postmenopausal        The following portions of the patient's history were reviewed and updated as appropriate: allergies, current medications, past family history, past medical history, past social history, past surgical history and problem list     Review of Systems  Review of Systems   Constitutional: Negative for chills and fever  Respiratory: Negative for shortness of breath  Cardiovascular: Negative for chest pain  Gastrointestinal: Negative for abdominal pain  Genitourinary: Negative for dysuria, pelvic pain, vaginal bleeding, vaginal discharge and vaginal pain  Negative for breast pain or lumps  Negative for stress urinary incontinence  Neurological: Negative for headaches  Objective   /84 (BP Location: Right arm)   Wt 70 8 kg (156 lb)   BMI 25 56 kg/m²     Physical Exam   Constitutional: She is oriented to person, place, and time  She appears well-developed and well-nourished  Neck: No thyromegaly present  Cardiovascular: Normal rate and regular rhythm  Pulmonary/Chest: Effort normal and breath sounds normal  Right breast exhibits no mass, no nipple discharge, no skin change and no tenderness  Left breast exhibits no mass, no nipple discharge, no skin change and no tenderness  Abdominal: Soft  There is no tenderness  There is no rebound and no guarding  Genitourinary: There is no rash or lesion on the right labia  There is no rash or lesion on the left labia  Uterus is not enlarged and not tender  Cervix exhibits no motion tenderness and no discharge  Right adnexum displays no mass and no tenderness  Left adnexum displays no mass and no tenderness  No bleeding in the vagina  No vaginal discharge found  Neurological: She is alert and oriented to person, place, and time  Psychiatric: She has a normal mood and affect  Nursing note and vitals reviewed

## 2019-03-06 ENCOUNTER — APPOINTMENT (OUTPATIENT)
Dept: LAB | Facility: MEDICAL CENTER | Age: 59
End: 2019-03-06
Payer: COMMERCIAL

## 2019-03-06 ENCOUNTER — TRANSCRIBE ORDERS (OUTPATIENT)
Dept: LAB | Facility: MEDICAL CENTER | Age: 59
End: 2019-03-06

## 2019-03-06 DIAGNOSIS — R30.0 DYSURIA: Primary | ICD-10-CM

## 2019-03-06 LAB

## 2019-03-06 PROCEDURE — 87086 URINE CULTURE/COLONY COUNT: CPT

## 2019-03-06 PROCEDURE — 81001 URINALYSIS AUTO W/SCOPE: CPT

## 2019-03-07 LAB — BACTERIA UR CULT: NORMAL

## 2019-04-30 ENCOUNTER — TRANSCRIBE ORDERS (OUTPATIENT)
Dept: LAB | Facility: MEDICAL CENTER | Age: 59
End: 2019-04-30

## 2019-04-30 ENCOUNTER — APPOINTMENT (OUTPATIENT)
Dept: LAB | Facility: MEDICAL CENTER | Age: 59
End: 2019-04-30
Payer: COMMERCIAL

## 2019-04-30 DIAGNOSIS — R80.9 PROTEINURIA, UNSPECIFIED TYPE: ICD-10-CM

## 2019-04-30 DIAGNOSIS — R80.9 PROTEINURIA, UNSPECIFIED TYPE: Primary | ICD-10-CM

## 2019-04-30 LAB
ANION GAP SERPL CALCULATED.3IONS-SCNC: 8 MMOL/L (ref 4–13)
BUN SERPL-MCNC: 13 MG/DL (ref 5–25)
CALCIUM SERPL-MCNC: 9 MG/DL (ref 8.3–10.1)
CHLORIDE SERPL-SCNC: 108 MMOL/L (ref 100–108)
CHOLEST SERPL-MCNC: 181 MG/DL (ref 50–200)
CO2 SERPL-SCNC: 27 MMOL/L (ref 21–32)
CREAT SERPL-MCNC: 0.64 MG/DL (ref 0.6–1.3)
EST. AVERAGE GLUCOSE BLD GHB EST-MCNC: 120 MG/DL
GFR SERPL CREATININE-BSD FRML MDRD: 99 ML/MIN/1.73SQ M
GLUCOSE P FAST SERPL-MCNC: 112 MG/DL (ref 65–99)
HBA1C MFR BLD: 5.8 % (ref 4.2–6.3)
HDLC SERPL-MCNC: 45 MG/DL (ref 40–60)
LDLC SERPL CALC-MCNC: 103 MG/DL (ref 0–100)
NONHDLC SERPL-MCNC: 136 MG/DL
POTASSIUM SERPL-SCNC: 4.3 MMOL/L (ref 3.5–5.3)
SODIUM SERPL-SCNC: 143 MMOL/L (ref 136–145)
TRIGL SERPL-MCNC: 166 MG/DL

## 2019-04-30 PROCEDURE — 80061 LIPID PANEL: CPT

## 2019-04-30 PROCEDURE — 83036 HEMOGLOBIN GLYCOSYLATED A1C: CPT

## 2019-04-30 PROCEDURE — 36415 COLL VENOUS BLD VENIPUNCTURE: CPT

## 2019-04-30 PROCEDURE — 80048 BASIC METABOLIC PNL TOTAL CA: CPT

## 2019-05-01 ENCOUNTER — OFFICE VISIT (OUTPATIENT)
Dept: CARDIOLOGY CLINIC | Facility: CLINIC | Age: 59
End: 2019-05-01
Payer: COMMERCIAL

## 2019-05-01 VITALS
HEART RATE: 72 BPM | SYSTOLIC BLOOD PRESSURE: 126 MMHG | BODY MASS INDEX: 25.71 KG/M2 | HEIGHT: 66 IN | DIASTOLIC BLOOD PRESSURE: 92 MMHG | WEIGHT: 160 LBS

## 2019-05-01 DIAGNOSIS — G50.9 TRIGEMINAL NERVE DISORDER: ICD-10-CM

## 2019-05-01 DIAGNOSIS — R07.89 OTHER CHEST PAIN: ICD-10-CM

## 2019-05-01 DIAGNOSIS — I47.1 SUPRAVENTRICULAR TACHYCARDIA (HCC): Primary | ICD-10-CM

## 2019-05-01 DIAGNOSIS — E78.2 MIXED HYPERLIPIDEMIA: ICD-10-CM

## 2019-05-01 DIAGNOSIS — I10 ESSENTIAL HYPERTENSION: ICD-10-CM

## 2019-05-01 PROCEDURE — 99214 OFFICE O/P EST MOD 30 MIN: CPT | Performed by: PHYSICIAN ASSISTANT

## 2019-05-01 PROCEDURE — 93000 ELECTROCARDIOGRAM COMPLETE: CPT | Performed by: PHYSICIAN ASSISTANT

## 2019-05-06 ENCOUNTER — HOSPITAL ENCOUNTER (OUTPATIENT)
Dept: CT IMAGING | Facility: HOSPITAL | Age: 59
Discharge: HOME | End: 2019-05-06
Payer: COMMERCIAL

## 2019-05-06 DIAGNOSIS — G50.9 TRIGEMINAL NERVE DISORDER: ICD-10-CM

## 2019-05-06 PROCEDURE — 70470 CT HEAD/BRAIN W/O & W/DYE: CPT

## 2019-05-06 RX ADMIN — IOHEXOL 100 ML: 350 INJECTION, SOLUTION INTRAVENOUS at 13:56

## 2019-05-12 ENCOUNTER — OFFICE VISIT (OUTPATIENT)
Dept: URGENT CARE | Facility: CLINIC | Age: 59
End: 2019-05-12
Payer: COMMERCIAL

## 2019-05-12 VITALS
RESPIRATION RATE: 18 BRPM | HEART RATE: 77 BPM | SYSTOLIC BLOOD PRESSURE: 156 MMHG | DIASTOLIC BLOOD PRESSURE: 82 MMHG | TEMPERATURE: 97.8 F | OXYGEN SATURATION: 97 %

## 2019-05-12 DIAGNOSIS — R31.9 URINARY TRACT INFECTION WITH HEMATURIA, SITE UNSPECIFIED: Primary | ICD-10-CM

## 2019-05-12 DIAGNOSIS — R30.0 DYSURIA: ICD-10-CM

## 2019-05-12 DIAGNOSIS — N39.0 URINARY TRACT INFECTION WITH HEMATURIA, SITE UNSPECIFIED: Primary | ICD-10-CM

## 2019-05-12 LAB
SL AMB  POCT GLUCOSE, UA: ABNORMAL
SL AMB LEUKOCYTE ESTERASE,UA: ABNORMAL
SL AMB POCT BILIRUBIN,UA: ABNORMAL
SL AMB POCT BLOOD,UA: ABNORMAL
SL AMB POCT CLARITY,UA: ABNORMAL
SL AMB POCT COLOR,UA: YELLOW
SL AMB POCT KETONES,UA: ABNORMAL
SL AMB POCT NITRITE,UA: ABNORMAL
SL AMB POCT PH,UA: 6
SL AMB POCT SPECIFIC GRAVITY,UA: 1.01
SL AMB POCT URINE PROTEIN: 30
SL AMB POCT UROBILINOGEN: 0.2

## 2019-05-12 PROCEDURE — 81002 URINALYSIS NONAUTO W/O SCOPE: CPT | Performed by: NURSE PRACTITIONER

## 2019-05-12 PROCEDURE — G0382 LEV 3 HOSP TYPE B ED VISIT: HCPCS | Performed by: NURSE PRACTITIONER

## 2019-05-12 PROCEDURE — 87086 URINE CULTURE/COLONY COUNT: CPT | Performed by: NURSE PRACTITIONER

## 2019-05-12 RX ORDER — NITROFURANTOIN 25; 75 MG/1; MG/1
100 CAPSULE ORAL 2 TIMES DAILY
Qty: 14 CAPSULE | Refills: 0 | Status: SHIPPED | OUTPATIENT
Start: 2019-05-12 | End: 2019-05-19

## 2019-05-13 LAB — BACTERIA UR CULT: NORMAL

## 2019-05-24 ENCOUNTER — HOSPITAL ENCOUNTER (OUTPATIENT)
Dept: NON INVASIVE DIAGNOSTICS | Facility: CLINIC | Age: 59
Discharge: HOME | End: 2019-05-24
Payer: COMMERCIAL

## 2019-05-24 DIAGNOSIS — R07.89 OTHER CHEST PAIN: ICD-10-CM

## 2019-05-24 PROCEDURE — 93306 TTE W/DOPPLER COMPLETE: CPT

## 2019-05-24 PROCEDURE — 93306 TTE W/DOPPLER COMPLETE: CPT | Performed by: INTERNAL MEDICINE

## 2019-07-17 DIAGNOSIS — I10 ESSENTIAL HYPERTENSION: Primary | ICD-10-CM

## 2019-07-17 RX ORDER — METOPROLOL SUCCINATE 50 MG/1
50 TABLET, EXTENDED RELEASE ORAL DAILY
Qty: 90 TABLET | Refills: 3 | Status: SHIPPED | OUTPATIENT
Start: 2019-07-17 | End: 2020-07-08

## 2019-08-06 ENCOUNTER — TRANSCRIBE ORDERS (OUTPATIENT)
Dept: LAB | Facility: MEDICAL CENTER | Age: 59
End: 2019-08-06

## 2019-08-06 ENCOUNTER — TRANSCRIBE ORDERS (OUTPATIENT)
Dept: ADMINISTRATIVE | Facility: HOSPITAL | Age: 59
End: 2019-08-06

## 2019-08-06 ENCOUNTER — APPOINTMENT (OUTPATIENT)
Dept: LAB | Facility: MEDICAL CENTER | Age: 59
End: 2019-08-06
Payer: COMMERCIAL

## 2019-08-06 DIAGNOSIS — L98.9 LESION OF FINGER: ICD-10-CM

## 2019-08-06 DIAGNOSIS — N39.0 RECURRENT UTI: ICD-10-CM

## 2019-08-06 DIAGNOSIS — R20.0 FACIAL NUMBNESS: ICD-10-CM

## 2019-08-06 DIAGNOSIS — N39.0 RECURRENT UTI: Primary | ICD-10-CM

## 2019-08-06 DIAGNOSIS — R20.0 FACIAL NUMBNESS: Primary | ICD-10-CM

## 2019-08-06 LAB
BACTERIA UR QL AUTO: ABNORMAL /HPF
BILIRUB UR QL STRIP: NEGATIVE
CLARITY UR: ABNORMAL
COLOR UR: YELLOW
GLUCOSE UR STRIP-MCNC: NEGATIVE MG/DL
HGB UR QL STRIP.AUTO: NEGATIVE
HYALINE CASTS #/AREA URNS LPF: ABNORMAL /LPF
KETONES UR STRIP-MCNC: NEGATIVE MG/DL
LEUKOCYTE ESTERASE UR QL STRIP: ABNORMAL
NITRITE UR QL STRIP: NEGATIVE
NON-SQ EPI CELLS URNS QL MICRO: ABNORMAL /HPF
PH UR STRIP.AUTO: 6 [PH]
PROT UR STRIP-MCNC: NEGATIVE MG/DL
RBC #/AREA URNS AUTO: ABNORMAL /HPF
SP GR UR STRIP.AUTO: 1.02 (ref 1–1.03)
UROBILINOGEN UR QL STRIP.AUTO: 0.2 E.U./DL
WBC #/AREA URNS AUTO: ABNORMAL /HPF

## 2019-08-06 PROCEDURE — 87086 URINE CULTURE/COLONY COUNT: CPT

## 2019-08-06 PROCEDURE — 81001 URINALYSIS AUTO W/SCOPE: CPT

## 2019-08-07 LAB — BACTERIA UR CULT: NORMAL

## 2019-08-08 ENCOUNTER — HOSPITAL ENCOUNTER (OUTPATIENT)
Dept: ULTRASOUND IMAGING | Facility: HOSPITAL | Age: 59
Discharge: HOME | End: 2019-08-08
Payer: COMMERCIAL

## 2019-08-08 ENCOUNTER — TRANSCRIBE ORDERS (OUTPATIENT)
Dept: ADMINISTRATIVE | Facility: HOSPITAL | Age: 59
End: 2019-08-08

## 2019-08-08 ENCOUNTER — HOSPITAL ENCOUNTER (OUTPATIENT)
Dept: RADIOLOGY | Facility: HOSPITAL | Age: 59
Discharge: HOME | End: 2019-08-08
Payer: COMMERCIAL

## 2019-08-08 DIAGNOSIS — M79.644 PAIN IN FINGER OF RIGHT HAND: Primary | ICD-10-CM

## 2019-08-08 DIAGNOSIS — N39.0 RECURRENT UTI: ICD-10-CM

## 2019-08-08 PROCEDURE — 76770 US EXAM ABDO BACK WALL COMP: CPT

## 2019-08-08 PROCEDURE — 73140 X-RAY EXAM OF FINGER(S): CPT

## 2019-08-13 PROBLEM — R39.9 UTI SYMPTOMS: Status: ACTIVE | Noted: 2019-08-13

## 2019-08-13 NOTE — PROGRESS NOTES
8/14/2019      Chief Complaint   Patient presents with    Urinary Tract Infection       Assessment and Plan    63 y o  female     1  UTI symptoms  - reviewed the patient's urine culture data with her, there are a few low level e coli cultures but mainly mixed contaminants and no growth  - discussed behavioral modifications with the patient including proper hydration and avoidance of excess amounts of bladder irritants  - have provided her oxybutynin immediate release 5 mg to take as needed during her flares  - if she is not feeling better on the oxybutynin and has continued UTI symptoms there are standing urine orders in the computer for her to obtained, she will notify our office if she drops a sample off at the lab    Follow-up 6 months for re-evaluation      History of Present Illness  Hiro Carranza is a 63 y o  female here for initial evaluation of urinary tract infection symptoms  The patient states she has had many UTIs over the past couple years  Urine culture data reveals mainly mixed contaminants but there are 2 cultures revealing very low levels of E coli  The patient states her typical symptoms are mid back pain, suprapubic discomfort, and dysuria at the end of her stream   She has gone through menopause and denies any dyspareunia  She does have occasional constipation but does not believe her UTI symptoms are ever correlated with this  She drinks mainly water throughout the day with 1 coffee only in the a m  [facility] Review of Systems   Constitutional: Negative for activity change, chills and fever  Gastrointestinal: Negative for abdominal distention and abdominal pain  Musculoskeletal: Negative for back pain and gait problem  Psychiatric/Behavioral: Negative for behavioral problems and confusion  Urinary Incontinence Screening      Most Recent Value   Urinary Incontinence   Urinary Incontinence? No   Incomplete emptying? Yes   Urinary frequency? No   Urinary urgency?   No   Urinary hesitancy? No   Dysuria (painful difficult urination)? No   Nocturia (waking up to use the bathroom)? No   Straining (having to push to go)? No   Weak stream?  No   Intermittent stream?  Yes   Post void dribbling? No          Past Medical History  Past Medical History:   Diagnosis Date    Abnormal Pap smear of cervix     Colon polyps     Fibroadenoma 1993    Forceps delivery     1994 daughter    Headache     History of cardiac monitoring 05/13/2006    Flutter in variety of situations  She did not feel any of the PVCs or PACs    History of echocardiogram 03/11/2016    EF 55%, mild TR    History of nuclear stress test 04/18/2006    Adenosine MPI:  Normal EKG response  No ischemia  Probable soft tissue attenuation  No definite evidence of scar      HPV (human papilloma virus) infection     Hyperlipemia     Hypertension     Mild dysplasia of cervix     Palpitations     SVT (supraventricular tachycardia) (HCC)     Urinary tract infection     Uses oral contraceptives        Past Social History  Past Surgical History:   Procedure Laterality Date    AV NODE ABLATION  05/10/2016    s/p successful AV iván ablation    BREAST EXCISIONAL BIOPSY Right 1998    BREAST LUMPECTOMY Left 1981    CERVICAL CONE BIOPSY  1988    COLONOSCOPY  2013     Social History     Tobacco Use   Smoking Status Never Smoker   Smokeless Tobacco Never Used       Past Family History  Family History   Problem Relation Age of Onset    Bone cancer Mother     Hypertension Mother     Multiple myeloma Mother     Dementia Father     Diabetes Father     Seizures Father     Heart disease Father         MI in his 62s    Hyperlipidemia Father     Breast cancer Maternal Aunt     Colon cancer Maternal Aunt     Breast cancer Paternal Aunt        Past Social history  Social History     Socioeconomic History    Marital status: /Civil Union     Spouse name: Not on file    Number of children: Not on file    Years of education: Not on file    Highest education level: Not on file   Occupational History    Not on file   Social Needs    Financial resource strain: Not on file    Food insecurity:     Worry: Not on file     Inability: Not on file    Transportation needs:     Medical: Not on file     Non-medical: Not on file   Tobacco Use    Smoking status: Never Smoker    Smokeless tobacco: Never Used   Substance and Sexual Activity    Alcohol use: Yes     Comment: rare    Drug use: No    Sexual activity: Yes     Partners: Male     Birth control/protection: Male Sterilization   Lifestyle    Physical activity:     Days per week: Not on file     Minutes per session: Not on file    Stress: Not on file   Relationships    Social connections:     Talks on phone: Not on file     Gets together: Not on file     Attends Anabaptist service: Not on file     Active member of club or organization: Not on file     Attends meetings of clubs or organizations: Not on file     Relationship status: Not on file    Intimate partner violence:     Fear of current or ex partner: Not on file     Emotionally abused: Not on file     Physically abused: Not on file     Forced sexual activity: Not on file   Other Topics Concern    Not on file   Social History Narrative    Not on file       Current Medications  Current Outpatient Medications   Medication Sig Dispense Refill    ASPIRIN 81 PO Take 81 mg by mouth daily       Biotin 2500 MCG CAPS Take by mouth      Calcium Carbonate-Vitamin D (CALTRATE 600+D) 600-400 MG-UNIT per tablet Take 1 tablet by mouth      Cholecalciferol 1000 units tablet Take 6,000 Units by mouth      levofloxacin (LEVAQUIN) 500 mg tablet Take 500 mg by mouth daily  0    metoprolol succinate (TOPROL-XL) 50 mg 24 hr tablet Take 1 tablet (50 mg total) by mouth daily 90 tablet 3    Probiotic Product (PROBIOTIC-10 PO) Take by mouth      rosuvastatin (CRESTOR) 10 MG tablet Take 10 mg by mouth daily       oxybutynin (DITROPAN) 5 mg tablet Take 1 tablet (5 mg total) by mouth 3 (three) times a day as needed (bladder pain) 30 tablet 5     No current facility-administered medications for this visit  Allergies  No Known Allergies      The following portions of the patient's history were reviewed and updated as appropriate: allergies, current medications, past medical history, past social history, past surgical history and problem list       Vitals  Vitals:    08/14/19 0856   BP: 140/98   Pulse: 76   Weight: 73 kg (161 lb)         Physical Exam  Constitutional   General appearance: Patient is seated and in no acute distress, well appearing and well nourished  Head and Face   Head and face: Normal     Eyes   Conjunctiva and lids: No erythema, swelling or discharge  Ears, Nose, Mouth, and Throat   Hearing: Normal     Pulmonary   Respiratory effort: No increased work of breathing or signs of respiratory distress  Cardiovascular   Examination of extremities for edema and/or varicosities: Normal     Abdomen   Abdomen: Non-tender, no masses  Musculoskeletal   Gait and station: Normal     Skin   Skin and subcutaneous tissue: Warm, dry, and intact  No visible lesions or rashes    Psychiatric   Judgment and insight: Normal  Recent and remote memory:  Normal  Mood and affect: Normal      Results  Recent Results (from the past 1 hour(s))   POCT urine dip    Collection Time: 08/14/19  9:06 AM   Result Value Ref Range    LEUKOCYTE ESTERASE,UA -     NITRITE,UA -     SL AMB POCT UROBILINOGEN 0 2     POCT URINE PROTEIN -      PH,UA 5 0     BLOOD,UA -     SPECIFIC GRAVITY,UA 1 020     KETONES,UA -     BILIRUBIN,UA -     GLUCOSE, UA -      COLOR,UA yellow     CLARITY,UA clear    ]  No results found for: PSA  Lab Results   Component Value Date    GLUCOSE 113 04/05/2014    CALCIUM 9 0 04/30/2019     04/05/2014    K 4 3 04/30/2019    CO2 27 04/30/2019     04/30/2019    BUN 13 04/30/2019    CREATININE 0 64 04/30/2019     Lab Results Component Value Date    WBC 6 76 06/29/2018    HGB 13 6 06/29/2018    HCT 41 6 06/29/2018    MCV 93 06/29/2018     06/29/2018       Orders  Orders Placed This Encounter   Procedures    Urine culture     Standing Status:   Future     Standing Expiration Date:   8/14/2020    Urinalysis with microscopic     Standing Status:   Future     Standing Expiration Date:   8/14/2020    POCT urine dip

## 2019-08-14 ENCOUNTER — OFFICE VISIT (OUTPATIENT)
Dept: UROLOGY | Facility: MEDICAL CENTER | Age: 59
End: 2019-08-14
Payer: COMMERCIAL

## 2019-08-14 VITALS
WEIGHT: 161 LBS | HEART RATE: 76 BPM | BODY MASS INDEX: 25.99 KG/M2 | DIASTOLIC BLOOD PRESSURE: 98 MMHG | SYSTOLIC BLOOD PRESSURE: 140 MMHG

## 2019-08-14 DIAGNOSIS — R39.9 UTI SYMPTOMS: Primary | ICD-10-CM

## 2019-08-14 LAB
SL AMB  POCT GLUCOSE, UA: NORMAL
SL AMB LEUKOCYTE ESTERASE,UA: NORMAL
SL AMB POCT BILIRUBIN,UA: NORMAL
SL AMB POCT BLOOD,UA: NORMAL
SL AMB POCT CLARITY,UA: CLEAR
SL AMB POCT COLOR,UA: YELLOW
SL AMB POCT KETONES,UA: NORMAL
SL AMB POCT NITRITE,UA: NORMAL
SL AMB POCT PH,UA: 5
SL AMB POCT SPECIFIC GRAVITY,UA: 1.02
SL AMB POCT URINE PROTEIN: NORMAL
SL AMB POCT UROBILINOGEN: 0.2

## 2019-08-14 PROCEDURE — 81002 URINALYSIS NONAUTO W/O SCOPE: CPT | Performed by: PHYSICIAN ASSISTANT

## 2019-08-14 PROCEDURE — 99202 OFFICE O/P NEW SF 15 MIN: CPT | Performed by: PHYSICIAN ASSISTANT

## 2019-08-14 RX ORDER — OXYBUTYNIN CHLORIDE 5 MG/1
5 TABLET ORAL 3 TIMES DAILY PRN
Qty: 30 TABLET | Refills: 5 | Status: SHIPPED | OUTPATIENT
Start: 2019-08-14 | End: 2020-03-04 | Stop reason: ALTCHOICE

## 2019-11-01 ENCOUNTER — APPOINTMENT (OUTPATIENT)
Dept: LAB | Facility: CLINIC | Age: 59
End: 2019-11-01
Payer: COMMERCIAL

## 2019-11-01 ENCOUNTER — TRANSCRIBE ORDERS (OUTPATIENT)
Dept: LAB | Facility: CLINIC | Age: 59
End: 2019-11-01

## 2019-11-01 DIAGNOSIS — R20.2 PARESTHESIA: ICD-10-CM

## 2019-11-01 DIAGNOSIS — M89.9 BONE DISEASE: ICD-10-CM

## 2019-11-01 DIAGNOSIS — R51.9 NONINTRACTABLE HEADACHE, UNSPECIFIED CHRONICITY PATTERN, UNSPECIFIED HEADACHE TYPE: ICD-10-CM

## 2019-11-01 DIAGNOSIS — R73.03 BORDERLINE DIABETIC: Primary | ICD-10-CM

## 2019-11-01 DIAGNOSIS — E78.5 HYPERLIPIDEMIA, UNSPECIFIED HYPERLIPIDEMIA TYPE: ICD-10-CM

## 2019-11-01 DIAGNOSIS — I49.9 CARDIAC ARRHYTHMIA, UNSPECIFIED CARDIAC ARRHYTHMIA TYPE: ICD-10-CM

## 2019-11-01 LAB
25(OH)D3 SERPL-MCNC: 48.4 NG/ML (ref 30–100)
ALBUMIN SERPL BCP-MCNC: 4.7 G/DL (ref 3.5–5)
ALP SERPL-CCNC: 78 U/L (ref 46–116)
ALT SERPL W P-5'-P-CCNC: 31 U/L (ref 12–78)
ANION GAP SERPL CALCULATED.3IONS-SCNC: 8 MMOL/L (ref 4–13)
AST SERPL W P-5'-P-CCNC: 17 U/L (ref 5–45)
BILIRUB SERPL-MCNC: 0.62 MG/DL (ref 0.2–1)
BUN SERPL-MCNC: 13 MG/DL (ref 5–25)
CALCIUM SERPL-MCNC: 9.8 MG/DL (ref 8.3–10.1)
CHLORIDE SERPL-SCNC: 106 MMOL/L (ref 100–108)
CHOLEST SERPL-MCNC: 221 MG/DL (ref 50–200)
CO2 SERPL-SCNC: 27 MMOL/L (ref 21–32)
CREAT SERPL-MCNC: 0.75 MG/DL (ref 0.6–1.3)
ERYTHROCYTE [DISTWIDTH] IN BLOOD BY AUTOMATED COUNT: 12.2 % (ref 11.6–15.1)
EST. AVERAGE GLUCOSE BLD GHB EST-MCNC: 126 MG/DL
FOLATE SERPL-MCNC: >20 NG/ML (ref 3.1–17.5)
GFR SERPL CREATININE-BSD FRML MDRD: 88 ML/MIN/1.73SQ M
GLUCOSE P FAST SERPL-MCNC: 131 MG/DL (ref 65–99)
HBA1C MFR BLD: 6 % (ref 4.2–6.3)
HCT VFR BLD AUTO: 40.8 % (ref 34.8–46.1)
HDLC SERPL-MCNC: 40 MG/DL
HGB BLD-MCNC: 13.4 G/DL (ref 11.5–15.4)
LDLC SERPL CALC-MCNC: 122 MG/DL (ref 0–100)
MCH RBC QN AUTO: 30.5 PG (ref 26.8–34.3)
MCHC RBC AUTO-ENTMCNC: 32.8 G/DL (ref 31.4–37.4)
MCV RBC AUTO: 93 FL (ref 82–98)
NONHDLC SERPL-MCNC: 181 MG/DL
PLATELET # BLD AUTO: 313 THOUSANDS/UL (ref 149–390)
PMV BLD AUTO: 10.9 FL (ref 8.9–12.7)
POTASSIUM SERPL-SCNC: 4.2 MMOL/L (ref 3.5–5.3)
PROT SERPL-MCNC: 8.4 G/DL (ref 6.4–8.2)
RBC # BLD AUTO: 4.4 MILLION/UL (ref 3.81–5.12)
SODIUM SERPL-SCNC: 141 MMOL/L (ref 136–145)
T4 FREE SERPL-MCNC: 0.82 NG/DL (ref 0.76–1.46)
TRIGL SERPL-MCNC: 294 MG/DL
TSH SERPL DL<=0.05 MIU/L-ACNC: 2 UIU/ML (ref 0.36–3.74)
VIT B12 SERPL-MCNC: 364 PG/ML (ref 100–900)
WBC # BLD AUTO: 6.57 THOUSAND/UL (ref 4.31–10.16)

## 2019-11-01 PROCEDURE — 84443 ASSAY THYROID STIM HORMONE: CPT

## 2019-11-01 PROCEDURE — 85027 COMPLETE CBC AUTOMATED: CPT

## 2019-11-01 PROCEDURE — 82746 ASSAY OF FOLIC ACID SERUM: CPT

## 2019-11-01 PROCEDURE — 82306 VITAMIN D 25 HYDROXY: CPT

## 2019-11-01 PROCEDURE — 36415 COLL VENOUS BLD VENIPUNCTURE: CPT

## 2019-11-01 PROCEDURE — 80053 COMPREHEN METABOLIC PANEL: CPT

## 2019-11-01 PROCEDURE — 84439 ASSAY OF FREE THYROXINE: CPT

## 2019-11-01 PROCEDURE — 82607 VITAMIN B-12: CPT

## 2019-11-01 PROCEDURE — 80061 LIPID PANEL: CPT

## 2019-11-01 PROCEDURE — 83036 HEMOGLOBIN GLYCOSYLATED A1C: CPT

## 2019-12-02 ENCOUNTER — CLINICAL SUPPORT (OUTPATIENT)
Dept: NUTRITION | Facility: HOSPITAL | Age: 59
End: 2019-12-02
Payer: COMMERCIAL

## 2019-12-02 VITALS — WEIGHT: 162.4 LBS | BODY MASS INDEX: 25.49 KG/M2 | HEIGHT: 67 IN

## 2019-12-02 DIAGNOSIS — E78.2 MIXED HYPERLIPIDEMIA: ICD-10-CM

## 2019-12-02 PROCEDURE — 97802 MEDICAL NUTRITION INDIV IN: CPT

## 2019-12-02 NOTE — PROGRESS NOTES
Initial Nutrition Assessment Form    Patient Name: Shayne Seay    YOB: 1960    Sex: Female     Assessment Date: 12/2/2019  Start Time: 0910 Stop Time: 1010 Total Minutes: 61     Data:  Present at session: self   Parent Concerns:    Medical Dx/Reason for Referral: Diabetes/hyperlipidemia   Past Medical History:   Diagnosis Date    Abnormal Pap smear of cervix     Colon polyps     Fibroadenoma 1993    Forceps delivery     1994 daughter    Headache     History of cardiac monitoring 05/13/2006    Flutter in variety of situations  She did not feel any of the PVCs or PACs    History of echocardiogram 03/11/2016    EF 55%, mild TR    History of nuclear stress test 04/18/2006    Adenosine MPI:  Normal EKG response  No ischemia  Probable soft tissue attenuation  No definite evidence of scar   HPV (human papilloma virus) infection     Hyperlipemia     Hypertension     Mild dysplasia of cervix     Palpitations     SVT (supraventricular tachycardia) (HCC)     Urinary tract infection     Uses oral contraceptives        Current Outpatient Medications   Medication Sig Dispense Refill    ASPIRIN 81 PO Take 81 mg by mouth daily       Biotin 2500 MCG CAPS Take by mouth      Calcium Carbonate-Vitamin D (CALTRATE 600+D) 600-400 MG-UNIT per tablet Take 1 tablet by mouth      Cholecalciferol 1000 units tablet Take 6,000 Units by mouth      levofloxacin (LEVAQUIN) 500 mg tablet Take 500 mg by mouth daily  0    metoprolol succinate (TOPROL-XL) 50 mg 24 hr tablet Take 1 tablet (50 mg total) by mouth daily 90 tablet 3    oxybutynin (DITROPAN) 5 mg tablet Take 1 tablet (5 mg total) by mouth 3 (three) times a day as needed (bladder pain) 30 tablet 5    Probiotic Product (PROBIOTIC-10 PO) Take by mouth      rosuvastatin (CRESTOR) 10 MG tablet Take 10 mg by mouth daily        No current facility-administered medications for this visit           Additional Meds/Supplements: Peter red 4 in 1, cranberry, biotin, Vit D, crestor   Special Learning Needs: none   Height: HC Readings from Last 3 Encounters:   No data found for [facility]       Weight: Wt Readings from Last 3 Encounters:   08/14/19 73 kg (161 lb)   05/01/19 72 6 kg (160 lb)   02/27/19 70 8 kg (156 lb)     There is no height or weight on file to calculate BMI  Recent Weight Change: []Yes     [x]No  Amount: Weight stable over last 3 years; prior to senior living, wt was 155#      Energy Needs: 1800 calories/225 g carbs/12 g saturated fat   No Known Allergies    Social History     Substance and Sexual Activity   Alcohol Use Yes    Comment: rare       Social History     Tobacco Use   Smoking Status Never Smoker   Smokeless Tobacco Never Used       Who shops? patient   Who cooks? patient   Exercise: walk   Prior Counseling? []Yes     [x]No  When:    Why:         Diet Hx:  Breakfast: 8-830 a m  Oatmeal, cinnamon, walnuts, banana, coffee with sugar free [place] vanilla, 2 oz juice; eats out 2 times months for breakfast   Lunch: 1200 p m  [place] yogurt,  mozzarelle cheese, fresh fruit, soup, 1/2 sandwich, tuna with light pappas       Dinner: 430-5 p m     Cooked meal with protein, lean meat and vegetable, rare starch intake and portions monitor by patient       Snacks: AM - 1 piece dove chocolate square, nuts or blueberries  PM - 1 piece dove chocolate square, popcorn  HS - 1 piece dove chocolate square, sugar free pudding        Nutrition Diagnosis:   Excessive fat intake  related to Food and nutrition-related knowledge deficit concerning appropriate amount of dietary fat as evidenced by  Cholesterol >200 mg/dL , LDL cholesterol >100 mg/dL, HDL cholesterol <40 mg/dL, triglycerides >150 mg/dL       Medical Nutrition Therapy Intervention:  [x]Individualized Meal Plan [x]Understanding Lab Values   []Basic Pathophysiology of Disease []Food/Medication Interactions   []Food Diary [x]Exercise   [x]Lifestyle/Behavior Modification Techniques []Medication, Mechanism of Action [x]Label Reading []Self Blood Glucose Monitoring   []Weight/BMI Goals []Other -    Other Notes:  Patient states she is mindful of her diet and does try to watch her intake        Comprehension: []Excellent  []Very Good  [x]Good  []Fair   []Poor    Receptivity: []Excellent  []Very Good  [x]Good  []Fair   []Poor    Expected Compliance: []Excellent  []Very Good  [x]Good  []Fair   []Poor        Goals:  1  Carb counting with goal range <225 g daily and saturated fat <12 g daily   2  Increase fiber intake >25 g daily   3  Increase intensity of exercise; interval training daily with exercise       Follow up to be determined    Labs:  CMP  Lab Results   Component Value Date     04/05/2014    K 4 2 11/01/2019     11/01/2019    CO2 27 11/01/2019    ANIONGAP 12 04/05/2014    BUN 13 11/01/2019    CREATININE 0 75 11/01/2019    GLUCOSE 113 04/05/2014    GLUF 131 (H) 11/01/2019    CALCIUM 9 8 11/01/2019    AST 17 11/01/2019    ALT 31 11/01/2019    ALKPHOS 78 11/01/2019    PROT 7 7 04/05/2014    BILITOT 0 4 04/05/2014    EGFR 88 11/01/2019       BMP  Lab Results   Component Value Date    GLUCOSE 113 04/05/2014    CALCIUM 9 8 11/01/2019     04/05/2014    K 4 2 11/01/2019    CO2 27 11/01/2019     11/01/2019    BUN 13 11/01/2019    CREATININE 0 75 11/01/2019       Lipids  Lab Results   Component Value Date    CHOL 223 07/24/2015    CHOL 201 04/05/2014     Lab Results   Component Value Date    HDL 40 11/01/2019    HDL 45 04/30/2019    HDL 36 (L) 10/29/2018     Lab Results   Component Value Date    LDLCALC 122 (H) 11/01/2019    LDLCALC 103 (H) 04/30/2019    LDLCALC 71 10/29/2018     Lab Results   Component Value Date    TRIG 294 (H) 11/01/2019    TRIG 166 (H) 04/30/2019    TRIG 224 (H) 10/29/2018     No results found for: CHOLHDL    Hemoglobin A1C  Lab Results   Component Value Date    HGBA1C 6 0 11/01/2019       Fasting Glucose  Lab Results   Component Value Date    GLUF 131 (H) 11/01/2019       Insulin Thyroid  No results found for: TSH, P9QFGNO, K5VSBZT, THYROIDAB    Hepatic Function Panel  Lab Results   Component Value Date    ALT 31 11/01/2019    AST 17 11/01/2019    ALKPHOS 78 11/01/2019    BILITOT 0 4 04/05/2014       Celiac Disease Antibody Panel  No results found for: ENDOMYSIAL IGA, GLIADIN IGA, GLIADIN IGG, IGA, TISSUE TRANSGLUT AB, TTG IGA   Iron  No results found for: IRON, TIBC, FERRITIN    Vitamins  No results found for: VITAMIN B2   No results found for: NICOTINAMIDE, NICOTINIC ACID   No results found for: VITAMINB6  Lab Results   Component Value Date    YCAVGUOP44 947 11/01/2019     No results found for: VITB5  No results found for: V3WASOED  No results found for: THYROGLB  No results found for: VITAMIN K   No results found for: 25-HYDROXY VIT D   No components found for: [facility] MA,RD,LDN,CDE  [address] [address]

## 2019-12-26 ENCOUNTER — OFFICE VISIT (OUTPATIENT)
Dept: URGENT CARE | Facility: CLINIC | Age: 59
End: 2019-12-26
Payer: COMMERCIAL

## 2019-12-26 VITALS
DIASTOLIC BLOOD PRESSURE: 90 MMHG | RESPIRATION RATE: 18 BRPM | TEMPERATURE: 99.4 F | HEIGHT: 67 IN | WEIGHT: 162 LBS | HEART RATE: 80 BPM | SYSTOLIC BLOOD PRESSURE: 132 MMHG | OXYGEN SATURATION: 97 % | BODY MASS INDEX: 25.43 KG/M2

## 2019-12-26 DIAGNOSIS — J01.90 ACUTE SINUSITIS, RECURRENCE NOT SPECIFIED, UNSPECIFIED LOCATION: Primary | ICD-10-CM

## 2019-12-26 PROCEDURE — G0382 LEV 3 HOSP TYPE B ED VISIT: HCPCS | Performed by: PHYSICIAN ASSISTANT

## 2019-12-26 RX ORDER — FLUTICASONE PROPIONATE 50 MCG
1 SPRAY, SUSPENSION (ML) NASAL DAILY
Qty: 16 G | Refills: 0 | Status: SHIPPED | OUTPATIENT
Start: 2019-12-26 | End: 2020-02-18 | Stop reason: ALTCHOICE

## 2019-12-26 RX ORDER — PREDNISONE 20 MG/1
40 TABLET ORAL DAILY
Qty: 10 TABLET | Refills: 0 | Status: SHIPPED | OUTPATIENT
Start: 2019-12-26 | End: 2019-12-31

## 2019-12-26 RX ORDER — AMOXICILLIN AND CLAVULANATE POTASSIUM 875; 125 MG/1; MG/1
1 TABLET, FILM COATED ORAL EVERY 12 HOURS SCHEDULED
Qty: 20 TABLET | Refills: 0 | Status: SHIPPED | OUTPATIENT
Start: 2019-12-26 | End: 2020-01-05

## 2019-12-26 NOTE — PROGRESS NOTES
[facility]        NAME: Carlon Schaumann is a 63 y o  female  : 1960    MRN: [id number]  DATE: 2019  TIME: 9:49 AM    Assessment and Plan   Acute sinusitis, recurrence not specified, unspecified location [J01 90]  1  Acute sinusitis, recurrence not specified, unspecified location  predniSONE 20 mg tablet    amoxicillin-clavulanate (AUGMENTIN) 875-125 mg per tablet    fluticasone (FLONASE) 50 mcg/act nasal spray         Patient Instructions       Follow up with PCP in 3-5 days  Proceed to  ER if symptoms worsen  Chief Complaint     Chief Complaint   Patient presents with    Cold Like Symptoms     C/O head congestion, sinus pressure x 3 days  History of Present Illness       60-year-old female presents for evaluation of sinus pain and pressure  Patient complaining of symptoms for 3-4 days  Patient having worse pain on the left side of her face  Patient complains of purulent nasal drainage  Complains of a slight cough  Denies any fever chills  Review of Systems   Review of Systems   Constitutional: Negative for chills, fatigue and fever  HENT: Positive for congestion, sinus pressure and sinus pain  Negative for ear pain, sore throat and trouble swallowing  Eyes: Negative for pain, discharge and redness  Respiratory: Positive for cough  Negative for chest tightness, shortness of breath and wheezing  Cardiovascular: Negative for chest pain, palpitations and leg swelling  Gastrointestinal: Negative for abdominal pain, diarrhea, nausea and vomiting  Musculoskeletal: Negative for arthralgias, joint swelling and myalgias  Skin: Negative for rash  Neurological: Negative for dizziness, weakness, numbness and headaches           Current Medications       Current Outpatient Medications:     amoxicillin-clavulanate (AUGMENTIN) 875-125 mg per tablet, Take 1 tablet by mouth every 12 (twelve) hours for 10 days, Disp: 20 tablet, Rfl: 0    ASPIRIN 81 PO, Take 81 mg by mouth daily , Disp: , Rfl:     Biotin 2500 MCG CAPS, Take by mouth, Disp: , Rfl:     Calcium Carbonate-Vitamin D (CALTRATE 600+D) 600-400 MG-UNIT per tablet, Take 1 tablet by mouth, Disp: , Rfl:     Cholecalciferol 1000 units tablet, Take 6,000 Units by mouth, Disp: , Rfl:     fluticasone (FLONASE) 50 mcg/act nasal spray, 1 spray into each nostril daily, Disp: 16 g, Rfl: 0    metoprolol succinate (TOPROL-XL) 50 mg 24 hr tablet, Take 1 tablet (50 mg total) by mouth daily, Disp: 90 tablet, Rfl: 3    oxybutynin (DITROPAN) 5 mg tablet, Take 1 tablet (5 mg total) by mouth 3 (three) times a day as needed (bladder pain), Disp: 30 tablet, Rfl: 5    predniSONE 20 mg tablet, Take 2 tablets (40 mg total) by mouth daily for 5 days, Disp: 10 tablet, Rfl: 0    rosuvastatin (CRESTOR) 10 MG tablet, Take 10 mg by mouth daily , Disp: , Rfl:     Current Allergies     Allergies as of 12/26/2019    (No Known Allergies)            The following portions of the patient's history were reviewed and updated as appropriate: allergies, current medications, past family history, past medical history, past social history, past surgical history and problem list      Past Medical History:   Diagnosis Date    Abnormal Pap smear of cervix     Colon polyps     Fibroadenoma 1993    Forceps delivery     1994 daughter    Headache     History of cardiac monitoring 05/13/2006    Flutter in variety of situations  She did not feel any of the PVCs or PACs    History of echocardiogram 03/11/2016    EF 55%, mild TR    History of nuclear stress test 04/18/2006    Adenosine MPI:  Normal EKG response  No ischemia  Probable soft tissue attenuation  No definite evidence of scar      HPV (human papilloma virus) infection     Hyperlipemia     Hypertension     Mild dysplasia of cervix     Palpitations     SVT (supraventricular tachycardia) (HCC)     Urinary tract infection     Uses oral contraceptives        Past Surgical History: Procedure Laterality Date    AV NODE ABLATION  05/10/2016    s/p successful AV iván ablation    BREAST EXCISIONAL BIOPSY Right 1998    BREAST LUMPECTOMY Left 1981    CERVICAL CONE BIOPSY  1988    COLONOSCOPY  2013       Family History   Problem Relation Age of Onset    Bone cancer Mother     Hypertension Mother     Multiple myeloma Mother     Dementia Father     Diabetes Father     Seizures Father     Heart disease Father         MI in his 62s    Hyperlipidemia Father     Breast cancer Maternal Aunt     Colon cancer Maternal Aunt     Breast cancer Paternal Aunt          Medications have been verified  Objective   /90   Pulse 80   Temp 99 4 °F (37 4 °C) (Tympanic)   Resp 18   Ht 5' 6 5" (1 689 m)   Wt 73 5 kg (162 lb)   SpO2 97%   BMI 25 76 kg/m²        Physical Exam     Physical Exam   Constitutional: She appears well-developed and well-nourished  HENT:   Head: Normocephalic  Right Ear: Hearing and tympanic membrane normal    Left Ear: Hearing and tympanic membrane normal    Nose: No mucosal edema  Mouth/Throat: Uvula is midline and mucous membranes are normal  Posterior oropharyngeal erythema present  Cardiovascular: Normal rate and regular rhythm  Pulmonary/Chest: Effort normal and breath sounds normal    Nursing note and vitals reviewed

## 2019-12-30 DIAGNOSIS — Z12.39 SCREENING FOR MALIGNANT NEOPLASM OF BREAST: ICD-10-CM

## 2020-01-15 ENCOUNTER — OFFICE VISIT (OUTPATIENT)
Dept: CARDIOLOGY CLINIC | Facility: CLINIC | Age: 60
End: 2020-01-15
Payer: COMMERCIAL

## 2020-01-15 VITALS
SYSTOLIC BLOOD PRESSURE: 148 MMHG | WEIGHT: 162 LBS | DIASTOLIC BLOOD PRESSURE: 98 MMHG | HEART RATE: 74 BPM | BODY MASS INDEX: 25.76 KG/M2

## 2020-01-15 DIAGNOSIS — R07.89 OTHER CHEST PAIN: ICD-10-CM

## 2020-01-15 DIAGNOSIS — I47.1 SUPRAVENTRICULAR TACHYCARDIA (HCC): Primary | ICD-10-CM

## 2020-01-15 DIAGNOSIS — I10 ESSENTIAL HYPERTENSION: ICD-10-CM

## 2020-01-15 DIAGNOSIS — E78.2 MIXED HYPERLIPIDEMIA: ICD-10-CM

## 2020-01-15 DIAGNOSIS — G50.9 TRIGEMINAL NERVE DISORDER: ICD-10-CM

## 2020-01-15 PROCEDURE — 99214 OFFICE O/P EST MOD 30 MIN: CPT | Performed by: PHYSICIAN ASSISTANT

## 2020-01-15 NOTE — ASSESSMENT & PLAN NOTE
S/P ablation on 5-10-16  maintain SR    With infrequent PVC's that are sensed as pounding palpitations    Continues on metoprolol    She will try taking it in the Pm to help her sleep

## 2020-01-15 NOTE — PROGRESS NOTES
[facility]   Outpatient Note  Nakia Ware  1960  [id number]  [facility], [facility] CARDIOLOGY [address] [address]  Raghu Ware is a 63 y o  female    Assessment and Plan:   1  Supraventricular tachycardia ([address] )  Assessment & Plan:  S/P ablation on 5-10-16  maintain SR    With infrequent PVC's that are sensed as pounding palpitations    Continues on metoprolol    She will try taking it in the Pm to help her sleep  2  Essential hypertension  Assessment & Plan:  Not optimally controlled  Lisinopril 10 mg daily is prescribed today  Chem-7 will be checked in 10-14 days  Reassessment in one month  3  Mixed hyperlipidemia  Assessment & Plan: Tolerating statin therapy  Most recent LDL is 122  Acceptable  4  Other chest pain    5  Trigeminal nerve disorder       Additional Plan:   Medications as detailed above  Pertinent testing  and Lab orders as outlined  Return visit will be in three months or earlier if there are problems  The patient is encouraged to call in the meantime if there are questions or concerns  Subjective: The patient is seen for a routine follow up in the office today regarding a history of SVT with ablation, HTN and HLD  She has been noting intermittent palpitations that she describes as pounding in the chest   Otherwise From a cardiac perspective, she is doing well without complaints of chest pain or exertional dyspnea  He has no syncope or near syncope, and denies edema orthopnea or PND  He does not complain of TIA or CVA symptoms           Social History  Social History     Tobacco Use   Smoking Status Never Smoker   Smokeless Tobacco Never Used   ,   Social History     Substance and Sexual Activity   Alcohol Use Yes    Comment: rare   ,   Social History     Substance and Sexual Activity   Drug Use No     Family History   Problem Relation Age of Onset    Bone cancer Mother     Hypertension Mother     Multiple myeloma Mother     Dementia Father     Diabetes Father     Seizures Father     Heart disease Father         MI in his 62s    Hyperlipidemia Father     Breast cancer Maternal Aunt     Colon cancer Maternal Aunt     Breast cancer Paternal Aunt        Medical and Surgical History  Past Medical History:   Diagnosis Date    Abnormal Pap smear of cervix     Colon polyps     Fibroadenoma 1993    Forceps delivery     1994 daughter    Headache     History of cardiac monitoring 05/13/2006    Flutter in variety of situations  She did not feel any of the PVCs or PACs    History of echocardiogram 03/11/2016    EF 55%, mild TR    History of nuclear stress test 04/18/2006    Adenosine MPI:  Normal EKG response  No ischemia  Probable soft tissue attenuation  No definite evidence of scar      HPV (human papilloma virus) infection     Hyperlipemia     Hypertension     Mild dysplasia of cervix     Palpitations     SVT (supraventricular tachycardia) (HCC)     Urinary tract infection     Uses oral contraceptives      Past Surgical History:   Procedure Laterality Date    AV NODE ABLATION  05/10/2016    s/p successful AV iván ablation    BREAST EXCISIONAL BIOPSY Right 1998    BREAST LUMPECTOMY Left 1981    CERVICAL CONE BIOPSY  1988    COLONOSCOPY  2013         Current Outpatient Medications:     ASPIRIN 81 PO, Take 81 mg by mouth daily , Disp: , Rfl:     Biotin 2500 MCG CAPS, Take by mouth, Disp: , Rfl:     Calcium Carbonate-Vitamin D (CALTRATE 600+D) 600-400 MG-UNIT per tablet, Take 1 tablet by mouth, Disp: , Rfl:     Cholecalciferol 1000 units tablet, Take 6,000 Units by mouth, Disp: , Rfl:     fluticasone (FLONASE) 50 mcg/act nasal spray, 1 spray into each nostril daily, Disp: 16 g, Rfl: 0    metoprolol succinate (TOPROL-XL) 50 mg 24 hr tablet, Take 1 tablet (50 mg total) by mouth daily, Disp: 90 tablet, Rfl: 3    oxybutynin (DITROPAN) 5 mg tablet, Take 1 tablet (5 mg total) by mouth 3 (three) times a day as needed (bladder pain), Disp: 30 tablet, Rfl: 5    rosuvastatin (CRESTOR) 10 MG tablet, Take 10 mg by mouth daily , Disp: , Rfl:   No Known Allergies    Review of Systems   Constitution: Negative  HENT: Negative  Eyes: Negative  Cardiovascular: Positive for palpitations  Negative for chest pain, claudication, cyanosis, dyspnea on exertion, irregular heartbeat, leg swelling, near-syncope, orthopnea, paroxysmal nocturnal dyspnea and syncope  Respiratory: Negative  Negative for cough, hemoptysis, shortness of breath, sleep disturbances due to breathing, snoring, sputum production and wheezing  Endocrine: Negative  Hematologic/Lymphatic: Negative  Skin: Negative  Musculoskeletal: Negative  Gastrointestinal: Negative  Genitourinary: Negative  Neurological: Negative  Psychiatric/Behavioral: Negative  Allergic/Immunologic: Negative  Objective:   /98   Pulse 74   Wt 73 5 kg (162 lb)   BMI 25 76 kg/m²   Physical Exam   Constitutional: She is oriented to person, place, and time  She appears well-developed and well-nourished  HENT:   Head: Normocephalic and atraumatic  Mouth/Throat: Oropharynx is clear and moist    Eyes: Conjunctivae and EOM are normal  No scleral icterus  Neck: Normal range of motion  Neck supple  No JVD present  No tracheal deviation present  Cardiovascular: Normal rate, normal heart sounds and intact distal pulses  An irregular rhythm present  Exam reveals no gallop and no friction rub  No murmur heard  Consistent with PVCs    Pulmonary/Chest: Effort normal and breath sounds normal  No respiratory distress  She has no wheezes  She has no rales  She exhibits no tenderness  Abdominal: Soft  Bowel sounds are normal  She exhibits no distension  There is no tenderness  Aorta not palpable    Musculoskeletal: Normal range of motion   She exhibits no edema or tenderness  Neurological: She is alert and oriented to person, place, and time  Skin: Skin is warm and dry  No rash noted  No erythema  No pallor  Psychiatric: She has a normal mood and affect  Her behavior is normal    Nursing note and vitals reviewed  Lab Review:   Lab Results   Component Value Date    CHOL 223 07/24/2015     Lab Results   Component Value Date    HDL 40 11/01/2019     Lab Results   Component Value Date    LDLCALC 122 (H) 11/01/2019     Lab Results   Component Value Date    TRIG 294 (H) 11/01/2019     Results Reviewed     None        Results Reviewed     None        Results Reviewed     None          Recent Cardiovascular Testing:   ECHO 6-25-61 Systolic function was normal  Ejection fraction was estimated to be 65 %  There were no regional wall motion abnormalities    RIGHT VENTRICLE:Systolic function was normal  MITRAL VALVE:  There was trace regurgitation  Leroy Franz was trace regurgitation  PERICARDIUM: There was no pericardial effusion      ECG Review:   5-1-19: Sinus rhythm   ICRBBB  Unchanged from prior

## 2020-01-15 NOTE — ASSESSMENT & PLAN NOTE
Not optimally controlled  Lisinopril 10 mg daily is prescribed today  Chem-7 will be checked in 10-14 days  Reassessment in one month

## 2020-01-27 ENCOUNTER — APPOINTMENT (OUTPATIENT)
Dept: LAB | Facility: CLINIC | Age: 60
End: 2020-01-27
Payer: COMMERCIAL

## 2020-01-27 LAB
ANION GAP SERPL CALCULATED.3IONS-SCNC: 3 MMOL/L (ref 4–13)
BUN SERPL-MCNC: 11 MG/DL (ref 5–25)
CALCIUM SERPL-MCNC: 9.7 MG/DL (ref 8.3–10.1)
CHLORIDE SERPL-SCNC: 108 MMOL/L (ref 100–108)
CO2 SERPL-SCNC: 28 MMOL/L (ref 21–32)
CREAT SERPL-MCNC: 0.69 MG/DL (ref 0.6–1.3)
GFR SERPL CREATININE-BSD FRML MDRD: 96 ML/MIN/1.73SQ M
GLUCOSE P FAST SERPL-MCNC: 142 MG/DL (ref 65–99)
POTASSIUM SERPL-SCNC: 4.7 MMOL/L (ref 3.5–5.3)
SODIUM SERPL-SCNC: 139 MMOL/L (ref 136–145)

## 2020-01-27 PROCEDURE — 36415 COLL VENOUS BLD VENIPUNCTURE: CPT | Performed by: PHYSICIAN ASSISTANT

## 2020-01-27 PROCEDURE — 80048 BASIC METABOLIC PNL TOTAL CA: CPT | Performed by: PHYSICIAN ASSISTANT

## 2020-02-14 ENCOUNTER — APPOINTMENT (OUTPATIENT)
Dept: LAB | Facility: CLINIC | Age: 60
End: 2020-02-14
Payer: COMMERCIAL

## 2020-02-14 DIAGNOSIS — R39.9 UTI SYMPTOMS: ICD-10-CM

## 2020-02-14 PROCEDURE — 87086 URINE CULTURE/COLONY COUNT: CPT

## 2020-02-14 PROCEDURE — 81001 URINALYSIS AUTO W/SCOPE: CPT

## 2020-02-14 RX ORDER — LISINOPRIL 10 MG/1
TABLET ORAL
COMMUNITY
Start: 2020-01-15 | End: 2020-07-08

## 2020-02-16 LAB — BACTERIA UR CULT: NORMAL

## 2020-02-17 NOTE — PROGRESS NOTES
2/18/2020      Chief Complaint   Patient presents with    UTI symptoms       Assessment and Plan    63 y o  female     1  UTI symptoms  - continue behavioral modifications including proper hydration and avoidance of excess amounts of bladder irritants  - continue oxybutynin immediate release 5 mg as needed during flares  - the patient is doing well with the above and have no urinary complaints today   - if she is not feeling better on the oxybutynin and has continued UTI symptoms there are standing urine orders in the computer for her to obtained, she will notify our office if she drops a sample off at the lab    FU 1 year      History of Present Illness  Liz Owens is a 63 y o  female here for follow up evaluation of UTI symptoms  Typical symptoms include back pain, suprapubic discomfort, and dysuria at the end of her stream    The patient was being treated for urinary tract infections but most of her culture data revealed either mixed contaminants  She did have 2 cultures revealing very low levels of E coli  At her last visit, the patient was educated on her culture data  She was given oxybutynin immediate release 5 mg to take anywhere from 1-3 times as needed during flares  She states that since her last visit in August she has done this twice and her symptoms resolved within approximately 1 day  She is doing well and is happy with her urinary pattern  Review of Systems   Constitutional: Negative for activity change, chills and fever  Gastrointestinal: Negative for abdominal distention and abdominal pain  Musculoskeletal: Negative for back pain and gait problem  Psychiatric/Behavioral: Negative for behavioral problems and confusion           Past Medical History  Past Medical History:   Diagnosis Date    Abnormal Pap smear of cervix     Colon polyps     Fibroadenoma 1993    Forceps delivery     1994 daughter    Headache     History of cardiac monitoring 05/13/2006    Flutter in variety of situations  She did not feel any of the PVCs or PACs    History of echocardiogram 03/11/2016    EF 55%, mild TR    History of nuclear stress test 04/18/2006    Adenosine MPI:  Normal EKG response  No ischemia  Probable soft tissue attenuation  No definite evidence of scar      HPV (human papilloma virus) infection     Hyperlipemia     Hypertension     Mild dysplasia of cervix     Palpitations     SVT (supraventricular tachycardia) (HCC)     Urinary tract infection     Uses oral contraceptives        Past Social History  Past Surgical History:   Procedure Laterality Date    AV NODE ABLATION  05/10/2016    s/p successful AV iván ablation    BREAST EXCISIONAL BIOPSY Right 1998    BREAST LUMPECTOMY Left 1981    CERVICAL CONE BIOPSY  1988    COLONOSCOPY  2013     Social History     Tobacco Use   Smoking Status Never Smoker   Smokeless Tobacco Never Used       Past Family History  Family History   Problem Relation Age of Onset    Bone cancer Mother     Hypertension Mother     Multiple myeloma Mother     Dementia Father     Diabetes Father     Seizures Father     Heart disease Father         MI in his 62s    Hyperlipidemia Father     Breast cancer Maternal Aunt     Colon cancer Maternal Aunt     Breast cancer Paternal Aunt        Past Social history  Social History     Socioeconomic History    Marital status: /Civil Union     Spouse name: Not on file    Number of children: Not on file    Years of education: Not on file    Highest education level: Not on file   Occupational History    Not on file   Social Needs    Financial resource strain: Not on file    Food insecurity:     Worry: Not on file     Inability: Not on file    Transportation needs:     Medical: Not on file     Non-medical: Not on file   Tobacco Use    Smoking status: Never Smoker    Smokeless tobacco: Never Used   Substance and Sexual Activity    Alcohol use: Yes     Comment: rare    Drug use: No    Sexual activity: Yes     Partners: Male     Birth control/protection: Male Sterilization   Lifestyle    Physical activity:     Days per week: Not on file     Minutes per session: Not on file    Stress: Not on file   Relationships    Social connections:     Talks on phone: Not on file     Gets together: Not on file     Attends Oriental orthodox service: Not on file     Active member of club or organization: Not on file     Attends meetings of clubs or organizations: Not on file     Relationship status: Not on file    Intimate partner violence:     Fear of current or ex partner: Not on file     Emotionally abused: Not on file     Physically abused: Not on file     Forced sexual activity: Not on file   Other Topics Concern    Not on file   Social History Narrative    Not on file       Current Medications  Current Outpatient Medications   Medication Sig Dispense Refill    ASPIRIN 81 PO Take 81 mg by mouth daily       Biotin 2500 MCG CAPS Take by mouth      Calcium Carbonate-Vitamin D (CALTRATE 600+D) 600-400 MG-UNIT per tablet Take 1 tablet by mouth      Cholecalciferol 1000 units tablet Take 6,000 Units by mouth      lisinopril (ZESTRIL) 10 mg tablet       metoprolol succinate (TOPROL-XL) 50 mg 24 hr tablet Take 1 tablet (50 mg total) by mouth daily 90 tablet 3    rosuvastatin (CRESTOR) 10 MG tablet Take 10 mg by mouth daily       oxybutynin (DITROPAN) 5 mg tablet Take 1 tablet (5 mg total) by mouth 3 (three) times a day as needed (bladder pain) (Patient not taking: Reported on 2/18/2020) 30 tablet 5     No current facility-administered medications for this visit          Allergies  No Known Allergies      The following portions of the patient's history were reviewed and updated as appropriate: allergies, current medications, past medical history, past social history, past surgical history and problem list       Vitals  Vitals:    02/18/20 0841   BP: 124/80   Pulse: 86   Weight: 73 5 kg (162 lb)   Height: 5' 6 5" (1 689 m)       Physical Exam  Constitutional   General appearance: Patient is seated and in no acute distress, well appearing and well nourished  Head and Face   Head and face: Normal     Eyes   Conjunctiva and lids: No erythema, swelling or discharge  Ears, Nose, Mouth, and Throat   Hearing: Normal     Pulmonary   Respiratory effort: No increased work of breathing or signs of respiratory distress  Cardiovascular   Examination of extremities for edema and/or varicosities: Normal     Abdomen   Abdomen: Non-tender, no masses  Musculoskeletal   Gait and station: Normal     Skin   Skin and subcutaneous tissue: Warm, dry, and intact  No visible lesions or rashes  Psychiatric   Judgment and insight: Normal  Recent and remote memory:  Normal  Mood and affect: Normal      Results  No results found for this or any previous visit (from the past 1 hour(s))  ]  No results found for: PSA  Lab Results   Component Value Date    GLUCOSE 113 04/05/2014    CALCIUM 9 7 01/27/2020     04/05/2014    K 4 7 01/27/2020    CO2 28 01/27/2020     01/27/2020    BUN 11 01/27/2020    CREATININE 0 69 01/27/2020     Lab Results   Component Value Date    WBC 6 57 11/01/2019    HGB 13 4 11/01/2019    HCT 40 8 11/01/2019    MCV 93 11/01/2019     11/01/2019       Orders  No orders of the defined types were placed in this encounter

## 2020-02-18 ENCOUNTER — OFFICE VISIT (OUTPATIENT)
Dept: UROLOGY | Facility: MEDICAL CENTER | Age: 60
End: 2020-02-18
Payer: COMMERCIAL

## 2020-02-18 VITALS
DIASTOLIC BLOOD PRESSURE: 80 MMHG | BODY MASS INDEX: 25.43 KG/M2 | WEIGHT: 162 LBS | HEART RATE: 86 BPM | SYSTOLIC BLOOD PRESSURE: 124 MMHG | HEIGHT: 67 IN

## 2020-02-18 DIAGNOSIS — R39.9 UTI SYMPTOMS: Primary | ICD-10-CM

## 2020-02-18 PROCEDURE — 99213 OFFICE O/P EST LOW 20 MIN: CPT | Performed by: PHYSICIAN ASSISTANT

## 2020-02-20 ENCOUNTER — OFFICE VISIT (OUTPATIENT)
Dept: CARDIOLOGY CLINIC | Facility: CLINIC | Age: 60
End: 2020-02-20
Payer: COMMERCIAL

## 2020-02-20 VITALS
BODY MASS INDEX: 25.27 KG/M2 | DIASTOLIC BLOOD PRESSURE: 80 MMHG | HEIGHT: 67 IN | WEIGHT: 161 LBS | HEART RATE: 72 BPM | SYSTOLIC BLOOD PRESSURE: 124 MMHG

## 2020-02-20 DIAGNOSIS — I10 ESSENTIAL HYPERTENSION: ICD-10-CM

## 2020-02-20 DIAGNOSIS — I47.1 SUPRAVENTRICULAR TACHYCARDIA (HCC): ICD-10-CM

## 2020-02-20 DIAGNOSIS — E78.2 MIXED HYPERLIPIDEMIA: ICD-10-CM

## 2020-02-20 DIAGNOSIS — I47.1 SVT (SUPRAVENTRICULAR TACHYCARDIA) (HCC): Primary | ICD-10-CM

## 2020-02-20 PROCEDURE — 99213 OFFICE O/P EST LOW 20 MIN: CPT | Performed by: INTERNAL MEDICINE

## 2020-02-20 NOTE — PROGRESS NOTES
Patient ID: Favio Veliz is a 63 y o  female  Plan:      Supraventricular tachycardia ([address] )  No recurrence  Now 3 and half years post ablation  Mixed hyperlipidemia  Given her family history of early CAD, statin therapy remains appropriate  Essential hypertension  Well controlled at present since lisinopril was added  Follow up Plan:  1 year EKG and follow-up visit  Only change I made today was to stop aspirin based on recent data  We talked of her mild hyperglycemia and her weakness for dark chocolate as well  HPI:  [facility] [place] seen today in follow-up for reasons noted above  She has had no recent chest pain or chest pressure  Since the last visit lisinopril was added and her blood pressure is much better controlled  Labs were reviewed  No results found for this visit on 02/20/20  Most recent or relevant cardiac/vascular testing:    Echocardiogram 05/24/2019: Within normal limits  Past Surgical History:   Procedure Laterality Date    AV NODE ABLATION  05/10/2016    s/p successful AV iván ablation    BREAST EXCISIONAL BIOPSY Right 1998    BREAST LUMPECTOMY Left 1981    CERVICAL CONE BIOPSY  1988    COLONOSCOPY  2013     CMP:   Lab Results   Component Value Date     04/05/2014    K 4 7 01/27/2020    K 5 1 04/05/2014     01/27/2020     04/05/2014    CO2 28 01/27/2020    CO2 29 5 04/05/2014    BUN 11 01/27/2020    BUN 12 04/05/2014    CREATININE 0 69 01/27/2020    CREATININE 0 51 (L) 04/05/2014    GLUCOSE 113 04/05/2014    EGFR 96 01/27/2020       Lipid Profile:   Lab Results   Component Value Date    CHOL 223 07/24/2015    TRIG 294 (H) 11/01/2019    TRIG 280 07/24/2015    HDL 40 11/01/2019    HDL 39 07/24/2015         Review of Systems   10  point ROS  was otherwise non pertinent or negative except as per HPI or as below     Gait: Normal         Objective:     /80   Pulse 72   Ht 5' 6 5" (1 689 m)   Wt 73 kg (161 lb)   BMI 25 60 kg/m²     PHYSICAL EXAM:    General:  Normal appearance in no distress  Eyes:  Anicteric  Oral mucosa:  Moist   Neck:  No JVD  Carotid upstrokes are brisk without bruits  No masses  Chest:  Clear to auscultation and percussion  Cardiac:  Normal PMI  Normal S1 and S2  No murmur gallop or rub  Abdomen:  Soft and nontender  No palpable organomegaly or aortic enlargement  Extremities:  No peripheral edema  Musculoskeletal:  Symmetric  Vascular:  Femoral pulses are brisk without bruits  Popliteal pulses are intact bilaterally  Pedal pulses are intact  Neuro:  Grossly symmetric  Psych:  Alert and oriented x3  Current Outpatient Medications:     Biotin 2500 MCG CAPS, Take by mouth, Disp: , Rfl:     Calcium Carbonate-Vitamin D (CALTRATE 600+D) 600-400 MG-UNIT per tablet, Take 1 tablet by mouth, Disp: , Rfl:     Cholecalciferol 1000 units tablet, Take 6,000 Units by mouth, Disp: , Rfl:     lisinopril (ZESTRIL) 10 mg tablet, , Disp: , Rfl:     metoprolol succinate (TOPROL-XL) 50 mg 24 hr tablet, Take 1 tablet (50 mg total) by mouth daily, Disp: 90 tablet, Rfl: 3    rosuvastatin (CRESTOR) 10 MG tablet, Take 10 mg by mouth daily , Disp: , Rfl:     oxybutynin (DITROPAN) 5 mg tablet, Take 1 tablet (5 mg total) by mouth 3 (three) times a day as needed (bladder pain) (Patient not taking: Reported on 2/18/2020), Disp: 30 tablet, Rfl: 5  No Known Allergies  Past Medical History:   Diagnosis Date    Abnormal Pap smear of cervix     Colon polyps     Fibroadenoma 1993    Forceps delivery     1994 daughter    Headache     History of cardiac monitoring 05/13/2006    Flutter in variety of situations  She did not feel any of the PVCs or PACs    History of echocardiogram 03/11/2016    EF 55%, mild TR    History of nuclear stress test 04/18/2006    Adenosine MPI:  Normal EKG response  No ischemia  Probable soft tissue attenuation  No definite evidence of scar      HPV (human papilloma virus) infection     Hyperlipemia  Hypertension     Mild dysplasia of cervix     Palpitations     SVT (supraventricular tachycardia) (HCC)     Urinary tract infection     Uses oral contraceptives            Social History     Tobacco Use   Smoking Status Never Smoker   Smokeless Tobacco Never Used

## 2020-03-04 ENCOUNTER — ANNUAL EXAM (OUTPATIENT)
Dept: OBGYN CLINIC | Facility: CLINIC | Age: 60
End: 2020-03-04
Payer: COMMERCIAL

## 2020-03-04 VITALS
HEIGHT: 66 IN | SYSTOLIC BLOOD PRESSURE: 120 MMHG | WEIGHT: 159 LBS | BODY MASS INDEX: 25.55 KG/M2 | DIASTOLIC BLOOD PRESSURE: 72 MMHG

## 2020-03-04 DIAGNOSIS — Z12.31 ENCOUNTER FOR SCREENING MAMMOGRAM FOR MALIGNANT NEOPLASM OF BREAST: ICD-10-CM

## 2020-03-04 DIAGNOSIS — Z01.419 ENCNTR FOR GYN EXAM (GENERAL) (ROUTINE) W/O ABN FINDINGS: ICD-10-CM

## 2020-03-04 PROBLEM — R39.9 UTI SYMPTOMS: Status: RESOLVED | Noted: 2019-08-13 | Resolved: 2020-03-04

## 2020-03-04 PROCEDURE — 99396 PREV VISIT EST AGE 40-64: CPT | Performed by: PHYSICIAN ASSISTANT

## 2020-03-04 NOTE — PROGRESS NOTES
Lina Oconnell   1960    CC:  Yearly exam    S:  63 y o  female here for yearly exam  She is postmenopausal and has had no vaginal bleeding  She denies vaginal discharge, itching, odor or dryness  Sexual activity: She is sexually active without pain, bleeding or dryness  She is seeing urology for recurrent urinary symptoms which seem to be managed with PRN oxybutynin  Last Pap: 2/3/16 neg/neg  Last Mammo: 12/27/19 neg  Last Colonoscopy: 5/2016 polyps (repeat 2021)    We reviewed [facility] guidelines for Pap testing  Family hx of breast cancer: maternal aunt, paternal aunt  Family hx of ovarian cancer:   Maternal aunt  Family hx of colon cancer: maternal aunt x 2      Current Outpatient Medications:     Biotin 2500 MCG CAPS, Take by mouth, Disp: , Rfl:     Calcium Carbonate-Vitamin D (CALTRATE 600+D) 600-400 MG-UNIT per tablet, Take 1 tablet by mouth, Disp: , Rfl:     Cholecalciferol 1000 units tablet, Take 6,000 Units by mouth, Disp: , Rfl:     lisinopril (ZESTRIL) 10 mg tablet, , Disp: , Rfl:     metoprolol succinate (TOPROL-XL) 50 mg 24 hr tablet, Take 1 tablet (50 mg total) by mouth daily, Disp: 90 tablet, Rfl: 3    rosuvastatin (CRESTOR) 10 MG tablet, Take 10 mg by mouth daily , Disp: , Rfl:   Social History     Socioeconomic History    Marital status: /Civil Union     Spouse name: Not on file    Number of children: Not on file    Years of education: Not on file    Highest education level: Not on file   Occupational History    Not on file   Social Needs    Financial resource strain: Not on file    Food insecurity:     Worry: Not on file     Inability: Not on file    Transportation needs:     Medical: Not on file     Non-medical: Not on file   Tobacco Use    Smoking status: Never Smoker    Smokeless tobacco: Never Used   Substance and Sexual Activity    Alcohol use: Yes     Comment: rare    Drug use: No    Sexual activity: Yes     Partners: Male     Birth control/protection: Male Sterilization   Lifestyle    Physical activity:     Days per week: Not on file     Minutes per session: Not on file    Stress: Not on file   Relationships    Social connections:     Talks on phone: Not on file     Gets together: Not on file     Attends Evangelical service: Not on file     Active member of club or organization: Not on file     Attends meetings of clubs or organizations: Not on file     Relationship status: Not on file    Intimate partner violence:     Fear of current or ex partner: Not on file     Emotionally abused: Not on file     Physically abused: Not on file     Forced sexual activity: Not on file   Other Topics Concern    Not on file   Social History Narrative    Not on file     Family History   Problem Relation Age of Onset    Bone cancer Mother     Hypertension Mother     Multiple myeloma Mother     Dementia Father     Diabetes Father     Seizures Father     Heart disease Father         MI in his 62s    Hyperlipidemia Father     Breast cancer Maternal Aunt     Colon cancer Maternal Aunt     Breast cancer Paternal Aunt      Past Medical History:   Diagnosis Date    Abnormal Pap smear of cervix     Colon polyps     Fibroadenoma 1993    Forceps delivery     1994 daughter    Headache     History of cardiac monitoring 05/13/2006    Flutter in variety of situations  She did not feel any of the PVCs or PACs    History of echocardiogram 03/11/2016    EF 55%, mild TR    History of nuclear stress test 04/18/2006    Adenosine MPI:  Normal EKG response  No ischemia  Probable soft tissue attenuation  No definite evidence of scar   HPV (human papilloma virus) infection     Hyperlipemia     Hypertension     Mild dysplasia of cervix     Palpitations     SVT (supraventricular tachycardia) (HCC)     Urinary tract infection     Uses oral contraceptives         Review of Systems   Respiratory: Negative  Cardiovascular: Negative  Gastrointestinal: Negative for constipation and diarrhea  Genitourinary: Negative for difficulty urinating, pelvic pain, vaginal bleeding, vaginal discharge, itching or odor  O:  Blood pressure 120/72, height 5' 6 14" (1 68 m), weight 72 1 kg (159 lb)  Patient appears well and is not in distress  Neck is supple without masses  Breasts are symmetrical without mass, tenderness, nipple discharge, skin changes or adenopathy  Abdomen is soft and nontender without masses  External genitals are normal without lesions or rashes  Urethral meatus and urethra are normal  Bladder is normal to palpation  Vagina is normal without discharge or bleeding  Cervix is normal without discharge or lesion  Uterus is normal, mobile, nontender without palpable mass  Adnexa are normal, nontender, without palpable mass  A:  Yearly exam      P:   Pap 2021  Mammo slip given   Colonoscopy due 2021    RTO one year for yearly exam or sooner as needed

## 2020-07-08 DIAGNOSIS — I10 ESSENTIAL HYPERTENSION: ICD-10-CM

## 2020-07-08 RX ORDER — METOPROLOL SUCCINATE 50 MG/1
TABLET, EXTENDED RELEASE ORAL
Qty: 90 TABLET | Refills: 3 | Status: SHIPPED | OUTPATIENT
Start: 2020-07-08 | End: 2021-06-29

## 2020-07-08 RX ORDER — LISINOPRIL 10 MG/1
TABLET ORAL
Qty: 90 TABLET | Refills: 1 | Status: SHIPPED | OUTPATIENT
Start: 2020-07-08 | End: 2020-12-28

## 2020-07-20 DIAGNOSIS — R39.9 UTI SYMPTOMS: Primary | ICD-10-CM

## 2020-07-20 NOTE — TELEPHONE ENCOUNTER
Patient left a message on the Medication Refill voice mail line requesting a new prescription for Oxybutynin to [facility]/pharmacy in [place]  Dosage and quantity were not specified

## 2020-07-22 RX ORDER — OXYBUTYNIN CHLORIDE 5 MG/1
5 TABLET ORAL 3 TIMES DAILY PRN
Qty: 90 TABLET | Refills: 5 | Status: SHIPPED | OUTPATIENT
Start: 2020-07-22

## 2020-07-22 NOTE — TELEPHONE ENCOUNTER
Medication history reviewed  Apparently an MA from GYN discontinued our Oxybutynin IR 5mg and stated "Therapy Completed "  The patient was last seen by Urology on 2/18/20 by Chavo Carpenter PA-C in the [facility] location; continuation of the medication was authorized at that time    Request for same, 30 day supply (90 tablets) with 5 refills was queued and forwarded to the Advanced Practitioner covering the [facility] location for approval

## 2020-08-12 ENCOUNTER — APPOINTMENT (OUTPATIENT)
Dept: LAB | Facility: CLINIC | Age: 60
End: 2020-08-12
Payer: COMMERCIAL

## 2020-08-12 ENCOUNTER — TRANSCRIBE ORDERS (OUTPATIENT)
Dept: LAB | Facility: CLINIC | Age: 60
End: 2020-08-12

## 2020-08-12 DIAGNOSIS — R39.9 UTI SYMPTOMS: ICD-10-CM

## 2020-08-12 DIAGNOSIS — N19 RENAL FAILURE, UNSPECIFIED CHRONICITY: ICD-10-CM

## 2020-08-12 DIAGNOSIS — E78.5 HYPERLIPIDEMIA, UNSPECIFIED HYPERLIPIDEMIA TYPE: ICD-10-CM

## 2020-08-12 DIAGNOSIS — N28.9 RENAL INSUFFICIENCY: ICD-10-CM

## 2020-08-12 DIAGNOSIS — E10.9 TYPE 1 DIABETES MELLITUS WITHOUT COMPLICATION (HCC): ICD-10-CM

## 2020-08-12 DIAGNOSIS — E10.9 TYPE 1 DIABETES MELLITUS WITHOUT COMPLICATION (HCC): Primary | ICD-10-CM

## 2020-08-12 LAB
CHOLEST SERPL-MCNC: 211 MG/DL (ref 50–200)
CREAT UR-MCNC: 157 MG/DL
HDLC SERPL-MCNC: 55 MG/DL
LDLC SERPL CALC-MCNC: 106 MG/DL (ref 0–100)
MICROALBUMIN UR-MCNC: 35.2 MG/L (ref 0–20)
MICROALBUMIN/CREAT 24H UR: 22 MG/G CREATININE (ref 0–30)
NONHDLC SERPL-MCNC: 156 MG/DL
TRIGL SERPL-MCNC: 252 MG/DL

## 2020-08-12 PROCEDURE — 82570 ASSAY OF URINE CREATININE: CPT

## 2020-08-12 PROCEDURE — 36415 COLL VENOUS BLD VENIPUNCTURE: CPT

## 2020-08-12 PROCEDURE — 80061 LIPID PANEL: CPT

## 2020-08-12 PROCEDURE — 83036 HEMOGLOBIN GLYCOSYLATED A1C: CPT

## 2020-08-12 PROCEDURE — 82043 UR ALBUMIN QUANTITATIVE: CPT

## 2020-08-13 LAB
EST. AVERAGE GLUCOSE BLD GHB EST-MCNC: 128 MG/DL
HBA1C MFR BLD: 6.1 %

## 2020-12-18 DIAGNOSIS — I10 ESSENTIAL HYPERTENSION: ICD-10-CM

## 2020-12-28 RX ORDER — LISINOPRIL 10 MG/1
TABLET ORAL
Qty: 90 TABLET | Refills: 3 | Status: SHIPPED | OUTPATIENT
Start: 2020-12-28 | End: 2021-11-22

## 2021-01-11 ENCOUNTER — TRANSCRIBE ORDERS (OUTPATIENT)
Dept: LAB | Facility: MEDICAL CENTER | Age: 61
End: 2021-01-11

## 2021-01-11 ENCOUNTER — LAB (OUTPATIENT)
Dept: LAB | Facility: MEDICAL CENTER | Age: 61
End: 2021-01-11
Payer: COMMERCIAL

## 2021-01-11 DIAGNOSIS — R30.0 DYSURIA: ICD-10-CM

## 2021-01-11 DIAGNOSIS — R30.0 DYSURIA: Primary | ICD-10-CM

## 2021-01-11 PROCEDURE — 87086 URINE CULTURE/COLONY COUNT: CPT

## 2021-01-12 LAB — BACTERIA UR CULT: NORMAL

## 2021-02-24 ENCOUNTER — OFFICE VISIT (OUTPATIENT)
Dept: UROLOGY | Facility: MEDICAL CENTER | Age: 61
End: 2021-02-24
Payer: COMMERCIAL

## 2021-02-24 VITALS
WEIGHT: 162 LBS | SYSTOLIC BLOOD PRESSURE: 118 MMHG | DIASTOLIC BLOOD PRESSURE: 72 MMHG | BODY MASS INDEX: 26.03 KG/M2 | HEIGHT: 66 IN

## 2021-02-24 DIAGNOSIS — N39.0 RECURRENT UTI: Primary | ICD-10-CM

## 2021-02-24 LAB
SL AMB  POCT GLUCOSE, UA: ABNORMAL
SL AMB LEUKOCYTE ESTERASE,UA: ABNORMAL
SL AMB POCT BILIRUBIN,UA: ABNORMAL
SL AMB POCT BLOOD,UA: ABNORMAL
SL AMB POCT CLARITY,UA: CLEAR
SL AMB POCT COLOR,UA: YELLOW
SL AMB POCT KETONES,UA: ABNORMAL
SL AMB POCT NITRITE,UA: ABNORMAL
SL AMB POCT PH,UA: 5.5
SL AMB POCT SPECIFIC GRAVITY,UA: 1.01
SL AMB POCT URINE PROTEIN: ABNORMAL
SL AMB POCT UROBILINOGEN: 0.2

## 2021-02-24 PROCEDURE — 81003 URINALYSIS AUTO W/O SCOPE: CPT | Performed by: UROLOGY

## 2021-02-24 PROCEDURE — 99213 OFFICE O/P EST LOW 20 MIN: CPT | Performed by: UROLOGY

## 2021-02-24 RX ORDER — NITROFURANTOIN 25; 75 MG/1; MG/1
CAPSULE ORAL
COMMUNITY
Start: 2021-01-11 | End: 2021-03-09 | Stop reason: ALTCHOICE

## 2021-02-24 NOTE — PROGRESS NOTES
HISTORY:     follow-up for several UTIs which she had over one year ago  Since that time, has not really had any infections  Has had episodes where she will get tremendous pelvic pressure and sensation of muscle tightening up in the pubic area  This occurs once every few months, and last for 3-5 seconds  She interprets as bladder spasm  One month ago, had urgency symptoms,   But urine culture was negative  ASSESSMENT / PLAN:     no apparent recurrence of infections since we saw her one year ago  I am not sure if the rare episodic pelvic pressure is truly a bladder spasm , because it last such a short time  Further observation of that, no treatment needed  She knows to call if any future issues  The following portions of the patient's history were reviewed and updated as appropriate: allergies, current medications, past family history, past medical history, past social history, past surgical history and problem list     Review of Systems   All other systems reviewed and are negative  Objective:     Physical Exam  Constitutional:       General: She is not in acute distress  Appearance: She is well-developed  She is not diaphoretic  HENT:      Head: Normocephalic and atraumatic  Eyes:      General: No scleral icterus  Pulmonary:      Effort: Pulmonary effort is normal    Skin:     Coloration: Skin is not pale  Neurological:      Mental Status: She is alert and oriented to person, place, and time  Psychiatric:         Behavior: Behavior normal          Thought Content:  Thought content normal          Judgment: Judgment normal            No results found for: PSA]  BUN   Date Value Ref Range Status   01/27/2020 11 5 - 25 mg/dL Final   04/05/2014 12 5 - 25 mg/dL Final     Creatinine   Date Value Ref Range Status   01/27/2020 0 69 0 60 - 1 30 mg/dL Final     Comment:     Standardized to IDMS reference method   04/05/2014 0 51 (L) 0 60 - 1 30 mg/dL Final     Comment: Standardized to IDMS reference method     No components found for: CBC      Patient Active Problem List   Diagnosis    Mixed hyperlipidemia    Cardiac abnormality    Vitamin D deficiency    Supraventricular tachycardia ([address] )    Essential hypertension    Trigeminal nerve disorder    Other chest pain        Diagnoses and all orders for this visit:    Recurrent UTI  -     POCT urine dip auto non-scope           Patient ID: Aftab Hilliard is a 62 y o  female  Current Outpatient Medications:     Biotin 2500 MCG CAPS, Take by mouth, Disp: , Rfl:     Calcium Carbonate-Vitamin D (CALTRATE 600+D) 600-400 MG-UNIT per tablet, Take 1 tablet by mouth, Disp: , Rfl:     Cholecalciferol 1000 units tablet, Take 6,000 Units by mouth, Disp: , Rfl:     lisinopril (ZESTRIL) 10 mg tablet, TAKE 1 TABLET BY MOUTH EVERY DAY, Disp: 90 tablet, Rfl: 3    metoprolol succinate (TOPROL-XL) 50 mg 24 hr tablet, TAKE 1 TABLET BY MOUTH EVERY DAY, Disp: 90 tablet, Rfl: 3    oxybutynin (DITROPAN) 5 mg tablet, Take 1 tablet (5 mg total) by mouth 3 (three) times a day as needed (bladder pain), Disp: 90 tablet, Rfl: 5    rosuvastatin (CRESTOR) 10 MG tablet, Take 10 mg by mouth daily , Disp: , Rfl:     nitrofurantoin (MACROBID) 100 mg capsule, , Disp: , Rfl:     Past Medical History:   Diagnosis Date    Abnormal Pap smear of cervix     Colon polyps     Fibroadenoma 1993    Forceps delivery     1994 daughter    Headache     History of cardiac monitoring 05/13/2006    Flutter in variety of situations  She did not feel any of the PVCs or PACs    History of echocardiogram 03/11/2016    EF 55%, mild TR    History of nuclear stress test 04/18/2006    Adenosine MPI:  Normal EKG response  No ischemia  Probable soft tissue attenuation  No definite evidence of scar      HPV (human papilloma virus) infection     Hyperlipemia     Hypertension     Mild dysplasia of cervix     Palpitations     SVT (supraventricular tachycardia) ([address] )     Urinary tract infection     Uses oral contraceptives        Past Surgical History:   Procedure Laterality Date    AV NODE ABLATION  05/10/2016    s/p successful AV iván ablation    BREAST EXCISIONAL BIOPSY Right 1998    BREAST LUMPECTOMY Left 1981    CERVICAL CONE BIOPSY  1988    COLONOSCOPY  2013       Social History

## 2021-02-26 ENCOUNTER — LAB (OUTPATIENT)
Dept: LAB | Facility: MEDICAL CENTER | Age: 61
End: 2021-02-26
Payer: COMMERCIAL

## 2021-02-26 ENCOUNTER — TRANSCRIBE ORDERS (OUTPATIENT)
Dept: LAB | Facility: MEDICAL CENTER | Age: 61
End: 2021-02-26

## 2021-02-26 DIAGNOSIS — E78.5 HYPERLIPIDEMIA, UNSPECIFIED HYPERLIPIDEMIA TYPE: ICD-10-CM

## 2021-02-26 DIAGNOSIS — I10 HYPERTENSION, UNSPECIFIED TYPE: Primary | ICD-10-CM

## 2021-02-26 DIAGNOSIS — R73.03 PREDIABETES: ICD-10-CM

## 2021-02-26 DIAGNOSIS — M89.9 BONE DISEASE: ICD-10-CM

## 2021-02-26 DIAGNOSIS — I10 HYPERTENSION, UNSPECIFIED TYPE: ICD-10-CM

## 2021-02-26 LAB
25(OH)D3 SERPL-MCNC: 36 NG/ML (ref 30–100)
ALBUMIN SERPL BCP-MCNC: 4.4 G/DL (ref 3.5–5)
ALP SERPL-CCNC: 79 U/L (ref 46–116)
ALT SERPL W P-5'-P-CCNC: 40 U/L (ref 12–78)
ANION GAP SERPL CALCULATED.3IONS-SCNC: 6 MMOL/L (ref 4–13)
AST SERPL W P-5'-P-CCNC: 23 U/L (ref 5–45)
BILIRUB SERPL-MCNC: 0.59 MG/DL (ref 0.2–1)
BUN SERPL-MCNC: 14 MG/DL (ref 5–25)
CALCIUM SERPL-MCNC: 9.6 MG/DL (ref 8.3–10.1)
CHLORIDE SERPL-SCNC: 104 MMOL/L (ref 100–108)
CHOLEST SERPL-MCNC: 225 MG/DL (ref 50–200)
CO2 SERPL-SCNC: 29 MMOL/L (ref 21–32)
CREAT SERPL-MCNC: 0.71 MG/DL (ref 0.6–1.3)
ERYTHROCYTE [DISTWIDTH] IN BLOOD BY AUTOMATED COUNT: 12.2 % (ref 11.6–15.1)
EST. AVERAGE GLUCOSE BLD GHB EST-MCNC: 137 MG/DL
GFR SERPL CREATININE-BSD FRML MDRD: 94 ML/MIN/1.73SQ M
GLUCOSE P FAST SERPL-MCNC: 148 MG/DL (ref 65–99)
HBA1C MFR BLD: 6.4 %
HCT VFR BLD AUTO: 43.6 % (ref 34.8–46.1)
HDLC SERPL-MCNC: 38 MG/DL
HGB BLD-MCNC: 14.1 G/DL (ref 11.5–15.4)
LDLC SERPL CALC-MCNC: 108 MG/DL (ref 0–100)
MCH RBC QN AUTO: 30.3 PG (ref 26.8–34.3)
MCHC RBC AUTO-ENTMCNC: 32.3 G/DL (ref 31.4–37.4)
MCV RBC AUTO: 94 FL (ref 82–98)
NONHDLC SERPL-MCNC: 187 MG/DL
PLATELET # BLD AUTO: 310 THOUSANDS/UL (ref 149–390)
PMV BLD AUTO: 11.1 FL (ref 8.9–12.7)
POTASSIUM SERPL-SCNC: 4 MMOL/L (ref 3.5–5.3)
PROT SERPL-MCNC: 8.4 G/DL (ref 6.4–8.2)
RBC # BLD AUTO: 4.66 MILLION/UL (ref 3.81–5.12)
SODIUM SERPL-SCNC: 139 MMOL/L (ref 136–145)
T4 FREE SERPL-MCNC: 0.8 NG/DL (ref 0.76–1.46)
TRIGL SERPL-MCNC: 397 MG/DL
TSH SERPL DL<=0.05 MIU/L-ACNC: 1.03 UIU/ML (ref 0.36–3.74)
WBC # BLD AUTO: 7.12 THOUSAND/UL (ref 4.31–10.16)

## 2021-02-26 PROCEDURE — 83036 HEMOGLOBIN GLYCOSYLATED A1C: CPT

## 2021-02-26 PROCEDURE — 84443 ASSAY THYROID STIM HORMONE: CPT

## 2021-02-26 PROCEDURE — 80061 LIPID PANEL: CPT

## 2021-02-26 PROCEDURE — 36415 COLL VENOUS BLD VENIPUNCTURE: CPT

## 2021-02-26 PROCEDURE — 84439 ASSAY OF FREE THYROXINE: CPT

## 2021-02-26 PROCEDURE — 82306 VITAMIN D 25 HYDROXY: CPT

## 2021-02-26 PROCEDURE — 85027 COMPLETE CBC AUTOMATED: CPT

## 2021-02-26 PROCEDURE — 80053 COMPREHEN METABOLIC PANEL: CPT

## 2021-03-08 NOTE — PROGRESS NOTES
Lino Coup   1960    CC:  Yearly exam    S:  62 y o  female here for yearly exam  She is postmenopausal and has had no vaginal bleeding  She denies vaginal discharge, itching, odor or dryness  Sexual activity: She is sexually active without pain, bleeding or dryness  Last Pap: 2/3/16 neg/neg  Last Mammo:  12/27/19 neg  Last Colonoscopy:  2016 (repeat 2021)    We reviewed [facility] guidelines for Pap testing       Family hx of breast cancer: maternal aunt, paternal aunt  Family hx of ovarian cancer: maternal aunt  Family hx of colon cancer: maternal aunt x 2     Current Outpatient Medications:     Biotin 2500 MCG CAPS, Take by mouth, Disp: , Rfl:     Calcium Carbonate-Vitamin D (CALTRATE 600+D) 600-400 MG-UNIT per tablet, Take 1 tablet by mouth, Disp: , Rfl:     Cholecalciferol 1000 units tablet, Take 6,000 Units by mouth, Disp: , Rfl:     lisinopril (ZESTRIL) 10 mg tablet, TAKE 1 TABLET BY MOUTH EVERY DAY, Disp: 90 tablet, Rfl: 3    metoprolol succinate (TOPROL-XL) 50 mg 24 hr tablet, TAKE 1 TABLET BY MOUTH EVERY DAY, Disp: 90 tablet, Rfl: 3    oxybutynin (DITROPAN) 5 mg tablet, Take 1 tablet (5 mg total) by mouth 3 (three) times a day as needed (bladder pain), Disp: 90 tablet, Rfl: 5    rosuvastatin (CRESTOR) 20 MG tablet, , Disp: , Rfl:   Social History     Socioeconomic History    Marital status: /Civil Union     Spouse name: Not on file    Number of children: Not on file    Years of education: Not on file    Highest education level: Not on file   Occupational History    Not on file   Social Needs    Financial resource strain: Not on file    Food insecurity     Worry: Not on file     Inability: Not on file   [employer] needs     Medical: Not on file     Non-medical: Not on file   Tobacco Use    Smoking status: Never Smoker    Smokeless tobacco: Never Used   Substance and Sexual Activity    Alcohol use: Yes     Frequency: Never     Drinks per session: 1 or 2     Binge frequency: Never     Comment: rare    Drug use: No    Sexual activity: Yes     Partners: Male     Birth control/protection: Male Sterilization   Lifestyle    Physical activity     Days per week: Not on file     Minutes per session: Not on file    Stress: Not on file   Relationships    Social connections     Talks on phone: Not on file     Gets together: Not on file     Attends Religion service: Not on file     Active member of club or organization: Not on file     Attends meetings of clubs or organizations: Not on file     Relationship status: Not on file    Intimate partner violence     Fear of current or ex partner: Not on file     Emotionally abused: Not on file     Physically abused: Not on file     Forced sexual activity: Not on file   Other Topics Concern    Not on file   Social History Narrative    Not on file     Family History   Problem Relation Age of Onset    Bone cancer Mother     Hypertension Mother     Multiple myeloma Mother     Dementia Father     Diabetes Father     Seizures Father     Heart disease Father         MI in his 62s    Hyperlipidemia Father     Breast cancer Maternal Aunt     Colon cancer Maternal Aunt     Breast cancer Paternal Aunt      Past Medical History:   Diagnosis Date    Abnormal Pap smear of cervix     Colon polyps     Fibroadenoma 1993    Forceps delivery     1994 daughter    Headache     History of cardiac monitoring 05/13/2006    Flutter in variety of situations  She did not feel any of the PVCs or PACs    History of echocardiogram 03/11/2016    EF 55%, mild TR    History of nuclear stress test 04/18/2006    Adenosine MPI:  Normal EKG response  No ischemia  Probable soft tissue attenuation  No definite evidence of scar      HPV (human papilloma virus) infection     Hyperlipemia     Hypertension     Mild dysplasia of cervix     Palpitations     SVT (supraventricular tachycardia) (HCC)     Urinary tract infection     Uses oral contraceptives         Review of Systems   Respiratory: Negative  Cardiovascular: Negative  Gastrointestinal: Negative for constipation and diarrhea  Genitourinary: Negative for difficulty urinating, pelvic pain, vaginal bleeding, vaginal discharge, itching or odor  O:  Blood pressure 132/80, height 5' 5 35" (1 66 m), weight 73 5 kg (162 lb)  Patient appears well and is not in distress  Neck is supple without masses  Breasts are symmetrical without mass, tenderness, nipple discharge, skin changes or adenopathy  Abdomen is soft and nontender without masses  External genitals are normal without lesions or rashes  Urethral meatus and urethra are normal  Bladder is normal to palpation  Vagina is normal without discharge or bleeding  Cervix is normal without discharge or lesion  Uterus is normal, mobile, nontender without palpable mass  Adnexa are normal, nontender, without palpable mass  A:  Yearly exam      P:   Pap and HPV today   Mammo slip given   Colonoscopy due, has appt    RTO one year for yearly exam or sooner as needed

## 2021-03-09 ENCOUNTER — ANNUAL EXAM (OUTPATIENT)
Dept: OBGYN CLINIC | Facility: CLINIC | Age: 61
End: 2021-03-09
Payer: COMMERCIAL

## 2021-03-09 VITALS
DIASTOLIC BLOOD PRESSURE: 80 MMHG | SYSTOLIC BLOOD PRESSURE: 132 MMHG | WEIGHT: 162 LBS | HEIGHT: 65 IN | BODY MASS INDEX: 26.99 KG/M2

## 2021-03-09 DIAGNOSIS — Z01.419 ENCNTR FOR GYN EXAM (GENERAL) (ROUTINE) W/O ABN FINDINGS: ICD-10-CM

## 2021-03-09 DIAGNOSIS — Z12.31 ENCOUNTER FOR SCREENING MAMMOGRAM FOR MALIGNANT NEOPLASM OF BREAST: ICD-10-CM

## 2021-03-09 PROCEDURE — G0145 SCR C/V CYTO,THINLAYER,RESCR: HCPCS | Performed by: PHYSICIAN ASSISTANT

## 2021-03-09 PROCEDURE — 87624 HPV HI-RISK TYP POOLED RSLT: CPT | Performed by: PHYSICIAN ASSISTANT

## 2021-03-09 PROCEDURE — 99396 PREV VISIT EST AGE 40-64: CPT | Performed by: PHYSICIAN ASSISTANT

## 2021-03-09 RX ORDER — ROSUVASTATIN CALCIUM 20 MG/1
20 TABLET, COATED ORAL DAILY
COMMUNITY
Start: 2021-03-03

## 2021-03-10 DIAGNOSIS — Z23 ENCOUNTER FOR IMMUNIZATION: ICD-10-CM

## 2021-03-11 LAB
HPV HR 12 DNA CVX QL NAA+PROBE: NEGATIVE
HPV16 DNA CVX QL NAA+PROBE: NEGATIVE
HPV18 DNA CVX QL NAA+PROBE: NEGATIVE

## 2021-03-14 LAB
LAB AP GYN PRIMARY INTERPRETATION: NORMAL
Lab: NORMAL

## 2021-03-29 ENCOUNTER — OFFICE VISIT (OUTPATIENT)
Dept: CARDIOLOGY CLINIC | Facility: CLINIC | Age: 61
End: 2021-03-29
Payer: COMMERCIAL

## 2021-03-29 VITALS
BODY MASS INDEX: 26.2 KG/M2 | DIASTOLIC BLOOD PRESSURE: 78 MMHG | HEIGHT: 66 IN | HEART RATE: 72 BPM | WEIGHT: 163 LBS | SYSTOLIC BLOOD PRESSURE: 122 MMHG

## 2021-03-29 DIAGNOSIS — E78.2 MIXED HYPERLIPIDEMIA: ICD-10-CM

## 2021-03-29 DIAGNOSIS — I47.1 SUPRAVENTRICULAR TACHYCARDIA (HCC): Primary | ICD-10-CM

## 2021-03-29 PROCEDURE — 99214 OFFICE O/P EST MOD 30 MIN: CPT | Performed by: INTERNAL MEDICINE

## 2021-03-29 PROCEDURE — 93000 ELECTROCARDIOGRAM COMPLETE: CPT | Performed by: INTERNAL MEDICINE

## 2021-03-29 NOTE — PROGRESS NOTES
Patient ID: Shayne Seay is a 62 y o  female  Plan:      Mixed hyperlipidemia  TG up with worsening of diet  She has cut back on cheese and chocolate chip cookies  Supraventricular tachycardia ([address] )  Ablation performed in May of 2016  No symptomatic recurrence  Essential hypertension  Well controlled  Follow up Plan/Other summary comments:  Two year EKG and follow-up visit  In the meantime she is going to continue on a better diet so as to improve her triglyceride level  HPI:  Patient is seen in follow-up regarding the above issues  Since her last visit she has felt well  Occasionally she feels as if fast heart beats will start but it is never a sustained sensation  Diet was a bit out of control with the [place] pandemic but has now improved since high triglycerides were discovered with lab testing  Results for orders placed or performed in visit on 03/29/21   POCT ECG    Impression    Sinus rhythm  Normal          Most recent or relevant cardiac/vascular testing:      Echocardiogram 05/24/2019: Within normal limits        Past Surgical History:   Procedure Laterality Date    AV NODE ABLATION  05/10/2016    s/p successful AV iván ablation    BREAST EXCISIONAL BIOPSY Right 1998    BREAST LUMPECTOMY Left 1981    CERVICAL CONE BIOPSY  1988    COLONOSCOPY  2013     CMP:   Lab Results   Component Value Date     04/05/2014    K 4 0 02/26/2021    K 5 1 04/05/2014     02/26/2021     04/05/2014    CO2 29 02/26/2021    CO2 29 5 04/05/2014    BUN 14 02/26/2021    BUN 12 04/05/2014    CREATININE 0 71 02/26/2021    CREATININE 0 51 (L) 04/05/2014    GLUCOSE 113 04/05/2014    EGFR 94 02/26/2021       Lipid Profile:   Lab Results   Component Value Date    CHOL 223 07/24/2015    TRIG 397 (H) 02/26/2021    TRIG 280 07/24/2015    HDL 38 (L) 02/26/2021    HDL 39 07/24/2015         Review of Systems   10  point ROS  was otherwise non pertinent or negative except as per HPI or as below    Gait: Normal         Objective:     /78   Pulse 72   Ht 5' 5 5" (1 664 m)   Wt 73 9 kg (163 lb)   BMI 26 71 kg/m²     PHYSICAL EXAM:    General:  Normal appearance in no distress  Eyes:  Anicteric  Oral mucosa:  Moist   Neck:  No JVD  Carotid upstrokes are brisk without bruits  No masses  Chest:  Clear to auscultation and percussion  Cardiac:  No palpable PMI  Normal S1 and S2  No murmur gallop or rub  Abdomen:  Soft and nontender  No palpable organomegaly or aortic enlargement  Extremities:  No peripheral edema  Musculoskeletal:  Symmetric  Vascular:  Femoral pulses are brisk without bruits  Popliteal pulses are intact bilaterally  Pedal pulses are intact  Neuro:  Grossly symmetric  Psych:  Alert and oriented x3  Current Outpatient Medications:     Biotin 2500 MCG CAPS, Take by mouth, Disp: , Rfl:     Calcium Carbonate-Vitamin D (CALTRATE 600+D) 600-400 MG-UNIT per tablet, Take 1 tablet by mouth, Disp: , Rfl:     Cholecalciferol 1000 units tablet, Take 6,000 Units by mouth, Disp: , Rfl:     lisinopril (ZESTRIL) 10 mg tablet, TAKE 1 TABLET BY MOUTH EVERY DAY, Disp: 90 tablet, Rfl: 3    metoprolol succinate (TOPROL-XL) 50 mg 24 hr tablet, TAKE 1 TABLET BY MOUTH EVERY DAY, Disp: 90 tablet, Rfl: 3    oxybutynin (DITROPAN) 5 mg tablet, Take 1 tablet (5 mg total) by mouth 3 (three) times a day as needed (bladder pain), Disp: 90 tablet, Rfl: 5    rosuvastatin (CRESTOR) 20 MG tablet, 20 mg daily , Disp: , Rfl:   No Known Allergies  Past Medical History:   Diagnosis Date    Abnormal Pap smear of cervix     Colon polyps     Fibroadenoma 1993    Forceps delivery     1994 daughter    Headache     History of cardiac monitoring 05/13/2006    Flutter in variety of situations  She did not feel any of the PVCs or PACs    History of echocardiogram 03/11/2016    EF 55%, mild TR    History of nuclear stress test 04/18/2006    Adenosine MPI:  Normal EKG response  No ischemia  Probable soft tissue attenuation  No definite evidence of scar      HPV (human papilloma virus) infection     Hyperlipemia     Hypertension     Mild dysplasia of cervix     Palpitations     SVT (supraventricular tachycardia) (HCC)     Urinary tract infection     Uses oral contraceptives            Social History     Tobacco Use   Smoking Status Never Smoker   Smokeless Tobacco Never Used

## 2021-06-29 DIAGNOSIS — I10 ESSENTIAL HYPERTENSION: ICD-10-CM

## 2021-06-29 RX ORDER — METOPROLOL SUCCINATE 50 MG/1
TABLET, EXTENDED RELEASE ORAL
Qty: 90 TABLET | Refills: 3 | Status: SHIPPED | OUTPATIENT
Start: 2021-06-29 | End: 2022-05-23

## 2021-08-10 ENCOUNTER — APPOINTMENT (OUTPATIENT)
Dept: LAB | Facility: CLINIC | Age: 61
End: 2021-08-10
Payer: COMMERCIAL

## 2021-08-10 DIAGNOSIS — I10 HYPERTENSION, UNSPECIFIED TYPE: ICD-10-CM

## 2021-08-10 DIAGNOSIS — R73.03 PREDIABETES: ICD-10-CM

## 2021-08-10 DIAGNOSIS — E78.5 HYPERLIPIDEMIA, UNSPECIFIED HYPERLIPIDEMIA TYPE: ICD-10-CM

## 2021-08-10 LAB
CHOLEST SERPL-MCNC: 176 MG/DL (ref 50–200)
EST. AVERAGE GLUCOSE BLD GHB EST-MCNC: 148 MG/DL
HBA1C MFR BLD: 6.8 %
HDLC SERPL-MCNC: 40 MG/DL
LDLC SERPL CALC-MCNC: 85 MG/DL (ref 0–100)
NONHDLC SERPL-MCNC: 136 MG/DL
TRIGL SERPL-MCNC: 256 MG/DL

## 2021-08-10 PROCEDURE — 83036 HEMOGLOBIN GLYCOSYLATED A1C: CPT

## 2021-08-10 PROCEDURE — 80061 LIPID PANEL: CPT

## 2021-08-10 PROCEDURE — 36415 COLL VENOUS BLD VENIPUNCTURE: CPT

## 2021-10-27 ENCOUNTER — IMMUNIZATIONS (OUTPATIENT)
Dept: FAMILY MEDICINE CLINIC | Facility: HOSPITAL | Age: 61
End: 2021-10-27

## 2021-10-27 DIAGNOSIS — Z23 ENCOUNTER FOR IMMUNIZATION: Primary | ICD-10-CM

## 2021-11-09 ENCOUNTER — APPOINTMENT (OUTPATIENT)
Dept: LAB | Facility: CLINIC | Age: 61
End: 2021-11-09
Payer: COMMERCIAL

## 2021-11-09 DIAGNOSIS — E11.9 TYPE 2 DIABETES MELLITUS WITHOUT COMPLICATION, WITHOUT LONG-TERM CURRENT USE OF INSULIN (HCC): ICD-10-CM

## 2021-11-09 DIAGNOSIS — E78.5 HYPERLIPIDEMIA, UNSPECIFIED HYPERLIPIDEMIA TYPE: ICD-10-CM

## 2021-11-09 LAB
CHOLEST SERPL-MCNC: 166 MG/DL (ref 50–200)
EST. AVERAGE GLUCOSE BLD GHB EST-MCNC: 146 MG/DL
HBA1C MFR BLD: 6.7 %
HDLC SERPL-MCNC: 40 MG/DL
LDLC SERPL CALC-MCNC: 71 MG/DL (ref 0–100)
NONHDLC SERPL-MCNC: 126 MG/DL
TRIGL SERPL-MCNC: 273 MG/DL

## 2021-11-09 PROCEDURE — 80061 LIPID PANEL: CPT

## 2021-11-09 PROCEDURE — 83036 HEMOGLOBIN GLYCOSYLATED A1C: CPT

## 2021-11-09 PROCEDURE — 36415 COLL VENOUS BLD VENIPUNCTURE: CPT

## 2021-11-22 DIAGNOSIS — I10 ESSENTIAL HYPERTENSION: ICD-10-CM

## 2021-11-22 RX ORDER — LISINOPRIL 10 MG/1
TABLET ORAL
Qty: 90 TABLET | Refills: 3 | Status: SHIPPED | OUTPATIENT
Start: 2021-11-22

## 2022-02-14 ENCOUNTER — APPOINTMENT (OUTPATIENT)
Dept: LAB | Facility: MEDICAL CENTER | Age: 62
End: 2022-02-14
Payer: COMMERCIAL

## 2022-02-14 DIAGNOSIS — I10 HYPERTENSION, UNSPECIFIED TYPE: ICD-10-CM

## 2022-02-14 DIAGNOSIS — E13.69 OTHER SPECIFIED DIABETES MELLITUS WITH OTHER SPECIFIED COMPLICATION, WITH LONG-TERM CURRENT USE OF INSULIN (HCC): ICD-10-CM

## 2022-02-14 DIAGNOSIS — Z01.812 BLOOD TESTS PRIOR TO TREATMENT OR PROCEDURE: ICD-10-CM

## 2022-02-14 DIAGNOSIS — E78.5 HYPERLIPIDEMIA, UNSPECIFIED HYPERLIPIDEMIA TYPE: ICD-10-CM

## 2022-02-14 DIAGNOSIS — Z79.4 OTHER SPECIFIED DIABETES MELLITUS WITH OTHER SPECIFIED COMPLICATION, WITH LONG-TERM CURRENT USE OF INSULIN (HCC): ICD-10-CM

## 2022-02-14 LAB
BUN SERPL-MCNC: 14 MG/DL (ref 5–25)
CREAT SERPL-MCNC: 0.6 MG/DL (ref 0.6–1.3)
CRP SERPL QL: 7.1 MG/L
ERYTHROCYTE [DISTWIDTH] IN BLOOD BY AUTOMATED COUNT: 12.3 % (ref 11.6–15.1)
ERYTHROCYTE [SEDIMENTATION RATE] IN BLOOD: 23 MM/HOUR (ref 0–29)
EST. AVERAGE GLUCOSE BLD GHB EST-MCNC: 131 MG/DL
GFR SERPL CREATININE-BSD FRML MDRD: 99 ML/MIN/1.73SQ M
HBA1C MFR BLD: 6.2 %
HCT VFR BLD AUTO: 40.5 % (ref 34.8–46.1)
HGB BLD-MCNC: 13.5 G/DL (ref 11.5–15.4)
MCH RBC QN AUTO: 30.9 PG (ref 26.8–34.3)
MCHC RBC AUTO-ENTMCNC: 33.3 G/DL (ref 31.4–37.4)
MCV RBC AUTO: 93 FL (ref 82–98)
PLATELET # BLD AUTO: 311 THOUSANDS/UL (ref 149–390)
PMV BLD AUTO: 11.4 FL (ref 8.9–12.7)
RBC # BLD AUTO: 4.37 MILLION/UL (ref 3.81–5.12)
T4 FREE SERPL-MCNC: 0.74 NG/DL (ref 0.76–1.46)
TSH SERPL DL<=0.05 MIU/L-ACNC: 0.82 UIU/ML (ref 0.36–3.74)
WBC # BLD AUTO: 6.66 THOUSAND/UL (ref 4.31–10.16)

## 2022-02-14 PROCEDURE — 85027 COMPLETE CBC AUTOMATED: CPT

## 2022-02-14 PROCEDURE — 82565 ASSAY OF CREATININE: CPT

## 2022-02-14 PROCEDURE — 84443 ASSAY THYROID STIM HORMONE: CPT

## 2022-02-14 PROCEDURE — 83036 HEMOGLOBIN GLYCOSYLATED A1C: CPT

## 2022-02-14 PROCEDURE — 84439 ASSAY OF FREE THYROXINE: CPT

## 2022-02-14 PROCEDURE — 84520 ASSAY OF UREA NITROGEN: CPT

## 2022-02-14 PROCEDURE — 36415 COLL VENOUS BLD VENIPUNCTURE: CPT

## 2022-02-14 PROCEDURE — 86140 C-REACTIVE PROTEIN: CPT

## 2022-02-14 PROCEDURE — 85652 RBC SED RATE AUTOMATED: CPT

## 2022-03-10 ENCOUNTER — HOSPITAL ENCOUNTER (OUTPATIENT)
Dept: MRI IMAGING | Facility: HOSPITAL | Age: 62
Discharge: HOME | End: 2022-03-10
Payer: COMMERCIAL

## 2022-03-10 DIAGNOSIS — L03.213 PRESEPTAL CELLULITIS: ICD-10-CM

## 2022-03-10 PROCEDURE — 70553 MRI BRAIN STEM W/O & W/DYE: CPT

## 2022-03-10 PROCEDURE — 70543 MRI ORBT/FAC/NCK W/O &W/DYE: CPT

## 2022-03-10 PROCEDURE — G1004 CDSM NDSC: HCPCS

## 2022-03-10 PROCEDURE — A9585 GADOBUTROL INJECTION: HCPCS | Performed by: FAMILY MEDICINE

## 2022-03-10 RX ADMIN — GADOBUTROL 7 ML: 604.72 INJECTION INTRAVENOUS at 08:35

## 2022-03-16 ENCOUNTER — ANNUAL EXAM (OUTPATIENT)
Dept: OBGYN CLINIC | Facility: CLINIC | Age: 62
End: 2022-03-16
Payer: COMMERCIAL

## 2022-03-16 VITALS
SYSTOLIC BLOOD PRESSURE: 136 MMHG | HEIGHT: 66 IN | WEIGHT: 157 LBS | DIASTOLIC BLOOD PRESSURE: 80 MMHG | BODY MASS INDEX: 25.23 KG/M2

## 2022-03-16 DIAGNOSIS — Z12.31 ENCOUNTER FOR SCREENING MAMMOGRAM FOR MALIGNANT NEOPLASM OF BREAST: ICD-10-CM

## 2022-03-16 DIAGNOSIS — Z01.419 ENCNTR FOR GYN EXAM (GENERAL) (ROUTINE) W/O ABN FINDINGS: Primary | ICD-10-CM

## 2022-03-16 PROBLEM — R07.89 OTHER CHEST PAIN: Status: RESOLVED | Noted: 2019-05-01 | Resolved: 2022-03-16

## 2022-03-16 PROCEDURE — 99396 PREV VISIT EST AGE 40-64: CPT | Performed by: PHYSICIAN ASSISTANT

## 2022-03-16 RX ORDER — LANCETS 28 GAUGE
EACH MISCELLANEOUS
COMMUNITY
Start: 2022-01-21

## 2022-03-16 RX ORDER — BLOOD-GLUCOSE METER
KIT MISCELLANEOUS DAILY
COMMUNITY
Start: 2022-01-21

## 2022-03-16 NOTE — PROGRESS NOTES
Rosa Ny   1960    CC:  Yearly exam    S:  62 y o  female here for yearly exam  She is postmenopausal and has had no vaginal bleeding  She denies vaginal discharge, itching, odor or dryness  Sexual activity: She is sexually active without pain, bleeding or dryness  She recently had an episode of shingles near her left eye  Last Pap: 3/9/21 neg/neg  Last Mammo:  12/30/21 neg  Last Colonoscopy: 8/27/21 - repeat 5 years  Last DEXA: never    We reviewed [facility] guidelines for Pap testing  Family hx of breast cancer: maternal aunt, paternal aunt  Family hx of ovarian cancer: maternal aunt  Family hx of colon cancer: maternal aunt x 2      Current Outpatient Medications:     Biotin 2500 MCG CAPS, Take by mouth, Disp: , Rfl:     Calcium Carbonate-Vitamin D (CALTRATE 600+D) 600-400 MG-UNIT per tablet, Take 1 tablet by mouth, Disp: , Rfl:     Cholecalciferol 1000 units tablet, Take 6,000 Units by mouth, Disp: , Rfl:     FREESTYLE LITE test strip, daily Test, Disp: , Rfl:     Lancets (freestyle) lancets, USE ONCE DAILY AS NEEDED, Disp: , Rfl:     lisinopril (ZESTRIL) 10 mg tablet, TAKE 1 TABLET BY MOUTH EVERY DAY, Disp: 90 tablet, Rfl: 3    metoprolol succinate (TOPROL-XL) 50 mg 24 hr tablet, TAKE 1 TABLET BY MOUTH EVERY DAY, Disp: 90 tablet, Rfl: 3    oxybutynin (DITROPAN) 5 mg tablet, Take 1 tablet (5 mg total) by mouth 3 (three) times a day as needed (bladder pain), Disp: 90 tablet, Rfl: 5    rosuvastatin (CRESTOR) 20 MG tablet, 20 mg daily , Disp: , Rfl:   Social History     Socioeconomic History    Marital status: /Civil Union     Spouse name: Not on file    Number of children: Not on file    Years of education: Not on file    Highest education level: Not on file   Occupational History    Not on file   Tobacco Use    Smoking status: Never Smoker    Smokeless tobacco: Never Used   Vaping Use    Vaping Use: Never used   Substance and Sexual Activity    Alcohol use:  Yes Comment: rare    Drug use: No    Sexual activity: Yes     Partners: Male     Birth control/protection: Male Sterilization   Other Topics Concern    Not on file   Social History Narrative    Not on file     Social Determinants of Health     Financial Resource Strain: Not on file   Food Insecurity: Not on file   Transportation Needs: Not on file   Physical Activity: Not on file   Stress: Not on file   Social Connections: Not on file   Intimate Partner Violence: Not on file   Housing Stability: Not on file     Family History   Problem Relation Age of Onset    Bone cancer Mother     Hypertension Mother     Multiple myeloma Mother     Dementia Father     Diabetes Father     Seizures Father     Heart disease Father         MI in his 62s    Hyperlipidemia Father     Breast cancer Maternal Aunt     Colon cancer Maternal Aunt     Breast cancer Paternal Aunt      Past Medical History:   Diagnosis Date    Abnormal Pap smear of cervix     Colon polyps     Fibroadenoma 1993    Forceps delivery     1994 daughter    Headache     History of cardiac monitoring 05/13/2006    Flutter in variety of situations  She did not feel any of the PVCs or PACs    History of echocardiogram 03/11/2016    EF 55%, mild TR    History of nuclear stress test 04/18/2006    Adenosine MPI:  Normal EKG response  No ischemia  Probable soft tissue attenuation  No definite evidence of scar   HPV (human papilloma virus) infection     Hyperlipemia     Hypertension     Mild dysplasia of cervix     Palpitations     SVT (supraventricular tachycardia) (HCC)     Urinary tract infection     Uses oral contraceptives         Review of Systems   Respiratory: Negative  Cardiovascular: Negative  Gastrointestinal: Negative for constipation and diarrhea  Genitourinary: Negative for difficulty urinating, pelvic pain, vaginal bleeding, vaginal discharge, itching or odor      O:  Blood pressure 136/80, height 5' 5 75" (1 67 m), weight 71 2 kg (157 lb)  Patient appears well and is not in distress  Neck is supple without masses  Breasts are symmetrical without mass, tenderness, nipple discharge, skin changes or adenopathy  Abdomen is soft and nontender without masses  External genitals are normal without lesions or rashes  Urethral meatus and urethra are normal  Bladder is normal to palpation  Vagina is normal without discharge or bleeding  Cervix is normal without discharge or lesion  Uterus is normal, mobile, nontender without palpable mass  Adnexa are normal, nontender, without palpable mass  A:   Yearly exam      P:   Pap 2026  Mammo slip given   Colonoscopy 2026    RTO one year for yearly exam or sooner as needed

## 2022-05-11 ENCOUNTER — APPOINTMENT (OUTPATIENT)
Dept: LAB | Facility: CLINIC | Age: 62
End: 2022-05-11
Payer: COMMERCIAL

## 2022-05-11 DIAGNOSIS — E11.9 TYPE 2 DIABETES MELLITUS WITHOUT COMPLICATION, WITHOUT LONG-TERM CURRENT USE OF INSULIN (HCC): ICD-10-CM

## 2022-05-11 DIAGNOSIS — I10 HYPERTENSION, UNSPECIFIED TYPE: ICD-10-CM

## 2022-05-11 DIAGNOSIS — R79.89 ABNORMAL THYROID SCREEN (BLOOD): ICD-10-CM

## 2022-05-11 DIAGNOSIS — E55.9 VITAMIN D DEFICIENCY: ICD-10-CM

## 2022-05-11 DIAGNOSIS — E78.5 HYPERLIPIDEMIA, UNSPECIFIED HYPERLIPIDEMIA TYPE: ICD-10-CM

## 2022-05-11 LAB
25(OH)D3 SERPL-MCNC: 52 NG/ML (ref 30–100)
ALBUMIN SERPL BCP-MCNC: 4 G/DL (ref 3.5–5)
ALP SERPL-CCNC: 72 U/L (ref 46–116)
ALT SERPL W P-5'-P-CCNC: 34 U/L (ref 12–78)
ANION GAP SERPL CALCULATED.3IONS-SCNC: 4 MMOL/L (ref 4–13)
AST SERPL W P-5'-P-CCNC: 18 U/L (ref 5–45)
BILIRUB SERPL-MCNC: 0.47 MG/DL (ref 0.2–1)
BUN SERPL-MCNC: 9 MG/DL (ref 5–25)
CALCIUM SERPL-MCNC: 9.4 MG/DL (ref 8.3–10.1)
CHLORIDE SERPL-SCNC: 109 MMOL/L (ref 100–108)
CHOLEST SERPL-MCNC: 160 MG/DL
CO2 SERPL-SCNC: 28 MMOL/L (ref 21–32)
CREAT SERPL-MCNC: 0.72 MG/DL (ref 0.6–1.3)
GFR SERPL CREATININE-BSD FRML MDRD: 91 ML/MIN/1.73SQ M
GLUCOSE P FAST SERPL-MCNC: 152 MG/DL (ref 65–99)
HDLC SERPL-MCNC: 37 MG/DL
LDLC SERPL CALC-MCNC: 75 MG/DL (ref 0–100)
NONHDLC SERPL-MCNC: 123 MG/DL
POTASSIUM SERPL-SCNC: 4.4 MMOL/L (ref 3.5–5.3)
PROT SERPL-MCNC: 7.5 G/DL (ref 6.4–8.2)
SODIUM SERPL-SCNC: 141 MMOL/L (ref 136–145)
T4 FREE SERPL-MCNC: 0.81 NG/DL (ref 0.76–1.46)
TRIGL SERPL-MCNC: 239 MG/DL
TSH SERPL DL<=0.05 MIU/L-ACNC: 1.27 UIU/ML (ref 0.45–4.5)

## 2022-05-11 PROCEDURE — 80061 LIPID PANEL: CPT

## 2022-05-11 PROCEDURE — 83036 HEMOGLOBIN GLYCOSYLATED A1C: CPT

## 2022-05-11 PROCEDURE — 82306 VITAMIN D 25 HYDROXY: CPT

## 2022-05-11 PROCEDURE — 84443 ASSAY THYROID STIM HORMONE: CPT

## 2022-05-11 PROCEDURE — 80053 COMPREHEN METABOLIC PANEL: CPT

## 2022-05-11 PROCEDURE — 36415 COLL VENOUS BLD VENIPUNCTURE: CPT

## 2022-05-11 PROCEDURE — 84439 ASSAY OF FREE THYROXINE: CPT

## 2022-05-12 LAB
EST. AVERAGE GLUCOSE BLD GHB EST-MCNC: 137 MG/DL
HBA1C MFR BLD: 6.4 %

## 2022-05-22 DIAGNOSIS — I10 ESSENTIAL HYPERTENSION: ICD-10-CM

## 2022-05-23 RX ORDER — METOPROLOL SUCCINATE 50 MG/1
TABLET, EXTENDED RELEASE ORAL
Qty: 90 TABLET | Refills: 3 | Status: SHIPPED | OUTPATIENT
Start: 2022-05-23

## 2022-08-10 ENCOUNTER — APPOINTMENT (OUTPATIENT)
Dept: LAB | Facility: CLINIC | Age: 62
End: 2022-08-10
Payer: COMMERCIAL

## 2022-08-10 DIAGNOSIS — I10 HYPERTENSION, UNSPECIFIED TYPE: Primary | ICD-10-CM

## 2022-08-10 DIAGNOSIS — E11.9 TYPE 2 DIABETES MELLITUS WITHOUT COMPLICATION, WITH LONG-TERM CURRENT USE OF INSULIN (HCC): ICD-10-CM

## 2022-08-10 DIAGNOSIS — Z79.4 TYPE 2 DIABETES MELLITUS WITHOUT COMPLICATION, WITH LONG-TERM CURRENT USE OF INSULIN (HCC): ICD-10-CM

## 2022-08-10 DIAGNOSIS — E78.5 HYPERLIPIDEMIA, UNSPECIFIED HYPERLIPIDEMIA TYPE: ICD-10-CM

## 2022-08-10 LAB
CHOLEST SERPL-MCNC: 151 MG/DL
CREAT UR-MCNC: 130 MG/DL
FERRITIN SERPL-MCNC: 115 NG/ML (ref 8–388)
HDLC SERPL-MCNC: 39 MG/DL
LDLC SERPL CALC-MCNC: 61 MG/DL (ref 0–100)
MICROALBUMIN UR-MCNC: 16.2 MG/L (ref 0–20)
MICROALBUMIN/CREAT 24H UR: 12 MG/G CREATININE (ref 0–30)
NONHDLC SERPL-MCNC: 112 MG/DL
TRIGL SERPL-MCNC: 253 MG/DL

## 2022-08-10 PROCEDURE — 36415 COLL VENOUS BLD VENIPUNCTURE: CPT

## 2022-08-10 PROCEDURE — 82728 ASSAY OF FERRITIN: CPT

## 2022-08-10 PROCEDURE — 80061 LIPID PANEL: CPT

## 2022-08-10 PROCEDURE — 82043 UR ALBUMIN QUANTITATIVE: CPT

## 2022-08-10 PROCEDURE — 82570 ASSAY OF URINE CREATININE: CPT

## 2022-10-28 ENCOUNTER — APPOINTMENT (OUTPATIENT)
Dept: LAB | Facility: CLINIC | Age: 62
End: 2022-10-28
Payer: COMMERCIAL

## 2022-10-28 DIAGNOSIS — E78.5 HYPERLIPIDEMIA, UNSPECIFIED HYPERLIPIDEMIA TYPE: ICD-10-CM

## 2022-10-28 DIAGNOSIS — E11.9 TYPE 2 DIABETES MELLITUS WITHOUT COMPLICATION, WITH LONG-TERM CURRENT USE OF INSULIN (HCC): ICD-10-CM

## 2022-10-28 DIAGNOSIS — Z79.4 TYPE 2 DIABETES MELLITUS WITHOUT COMPLICATION, WITH LONG-TERM CURRENT USE OF INSULIN (HCC): ICD-10-CM

## 2022-10-28 LAB
CHOLEST SERPL-MCNC: 165 MG/DL
EST. AVERAGE GLUCOSE BLD GHB EST-MCNC: 131 MG/DL
HBA1C MFR BLD: 6.2 %
HDLC SERPL-MCNC: 38 MG/DL
LDLC SERPL CALC-MCNC: 76 MG/DL (ref 0–100)
NONHDLC SERPL-MCNC: 127 MG/DL
TRIGL SERPL-MCNC: 253 MG/DL

## 2022-10-28 PROCEDURE — 83036 HEMOGLOBIN GLYCOSYLATED A1C: CPT

## 2022-10-28 PROCEDURE — 80061 LIPID PANEL: CPT

## 2022-10-28 PROCEDURE — 36415 COLL VENOUS BLD VENIPUNCTURE: CPT

## 2022-12-25 DIAGNOSIS — I10 ESSENTIAL HYPERTENSION: ICD-10-CM

## 2022-12-27 RX ORDER — LISINOPRIL 10 MG/1
TABLET ORAL
Qty: 90 TABLET | Refills: 3 | Status: SHIPPED | OUTPATIENT
Start: 2022-12-27

## 2023-02-10 ENCOUNTER — APPOINTMENT (OUTPATIENT)
Dept: LAB | Facility: CLINIC | Age: 63
End: 2023-02-10

## 2023-02-10 ENCOUNTER — TRANSCRIBE ORDERS (OUTPATIENT)
Dept: LAB | Facility: CLINIC | Age: 63
End: 2023-02-10

## 2023-02-10 DIAGNOSIS — E78.5 HYPERLIPIDEMIA, UNSPECIFIED HYPERLIPIDEMIA TYPE: ICD-10-CM

## 2023-02-10 DIAGNOSIS — R73.01 ELEVATED FASTING GLUCOSE: Primary | ICD-10-CM

## 2023-02-10 DIAGNOSIS — I10 HYPERTENSION, UNSPECIFIED TYPE: ICD-10-CM

## 2023-02-10 DIAGNOSIS — R73.01 ELEVATED FASTING GLUCOSE: ICD-10-CM

## 2023-02-10 LAB
CHOLEST SERPL-MCNC: 156 MG/DL
HDLC SERPL-MCNC: 41 MG/DL
LDLC SERPL CALC-MCNC: 64 MG/DL (ref 0–100)
NONHDLC SERPL-MCNC: 115 MG/DL
TRIGL SERPL-MCNC: 253 MG/DL

## 2023-02-11 LAB
EST. AVERAGE GLUCOSE BLD GHB EST-MCNC: 131 MG/DL
HBA1C MFR BLD: 6.2 %

## 2023-03-15 NOTE — PROGRESS NOTES
Assessment   65 y o   presenting for annual exam      Plan   Diagnoses and all orders for this visit:    Encntr for gyn exam (general) (routine) w/o abn findings    Encounter for screening mammogram for malignant neoplasm of breast  -     Mammo screening bilateral w 3d & cad; Future    Other orders  -     Jardiance 10 MG TABS tablet; Take 10 mg by mouth every morning  -     ezetimibe (Zetia) 10 mg tablet; Take 10 mg by mouth daily        Pap up to date  Mammo slip given    Colonoscopy up to date    SBE encouraged, A yearly mammogram is recommended for breast cancer screening starting at age 36  ASCCP guidelines reviewed  Calcium/ Vit D dietary requirements discussed  Advised to call with any issues, all concerns & questions addressed  See provided information in your after visit summary     F/U Annually and PRN    Results will be released to [facility], if abnormal will call or message to review and discuss treatment plan      __________________________________________________________________    Yang Hewitt is a 65 y o   presenting for annual exam  She is without complaints    SCREENING  Last Pap: 3/2021 neg/neg  Last Mammo: 23 birads 2  Last Colonoscopy: 2021 5 year recall      GYN  , no VB    Sexually active: Yes - single partner - male  Contraception:     Hx Abnormal pap: hx cone biopsy in   We reviewed ASCCP guidelines for Pap testing today  Denies vaginal discharge, itching, odor, dyspareunia, pelvic pain and vulvar/vaginal symptoms      OB           Complaints: denies   Denies urgency, frequency, hematuria, leakage / change in stream, difficulty urinating  BREAST  Complaints: denies   Denies: breast lump, breast tenderness, nipple discharge, skin color change, and skin lesion(s)      Pertinent Family Hx:   Family hx of breast cancer: maternal aunt [de-identified]), paternal aunt (61s)  Family hx of ovarian cancer: maternal aunt ? ? Unsure if ovarian or uterine  Believes she probably had uterine cancer   Family hx of colon cancer: maternal aunt     Gene status of family members is unknown  Discussed option of genetics onc referral  Pt declines for now but will consider for the future pending any family hx changes       GENERAL  PMH reviewed/updated and is as below  Patient does follow with a PCP  SOCIAL  Smoking: no  Alcohol:rare  Drug: no  Occupation:retired- [employer]      Past Medical History:   Diagnosis Date   • Abnormal Pap smear of cervix    • Colon polyps    • Fibroadenoma 1993   • Forceps delivery     1994 daughter   • Headache    • History of cardiac monitoring 05/13/2006    Flutter in variety of situations  She did not feel any of the PVCs or PACs   • History of echocardiogram 03/11/2016    EF 55%, mild TR   • History of nuclear stress test 04/18/2006    Adenosine MPI:  Normal EKG response  No ischemia  Probable soft tissue attenuation  No definite evidence of scar     • HPV (human papilloma virus) infection    • Hyperlipemia    • Hypertension    • Mild dysplasia of cervix    • Palpitations    • SVT (supraventricular tachycardia) (HCC)    • Urinary tract infection    • Uses oral contraceptives        Past Surgical History:   Procedure Laterality Date   • AV NODE ABLATION  05/10/2016    s/p successful AV iván ablation   • BREAST EXCISIONAL BIOPSY Right 1998   • BREAST LUMPECTOMY Left 1981   • CERVICAL CONE BIOPSY  1988   • COLONOSCOPY  2013         Current Outpatient Medications:   •  Biotin 2500 MCG CAPS, Take by mouth, Disp: , Rfl:   •  Calcium Carbonate-Vitamin D (CALTRATE 600+D) 600-400 MG-UNIT per tablet, Take 1 tablet by mouth, Disp: , Rfl:   •  Cholecalciferol 1000 units tablet, Take 6,000 Units by mouth, Disp: , Rfl:   •  FREESTYLE LITE test strip, daily Test, Disp: , Rfl:   •  Lancets (freestyle) lancets, USE ONCE DAILY AS NEEDED, Disp: , Rfl:   •  lisinopril (ZESTRIL) 10 mg tablet, TAKE 1 TABLET BY MOUTH EVERY DAY, Disp: 90 tablet, Rfl: 3  •  metoprolol succinate (TOPROL-XL) 50 mg 24 hr tablet, TAKE 1 TABLET BY MOUTH EVERY DAY, Disp: 90 tablet, Rfl: 3  •  oxybutynin (DITROPAN) 5 mg tablet, Take 1 tablet (5 mg total) by mouth 3 (three) times a day as needed (bladder pain), Disp: 90 tablet, Rfl: 5  •  rosuvastatin (CRESTOR) 20 MG tablet, 20 mg daily , Disp: , Rfl:     No Known Allergies    Social History     Socioeconomic History   • Marital status: /Civil Union     Spouse name: Not on file   • Number of children: Not on file   • Years of education: Not on file   • Highest education level: Not on file   Occupational History   • Not on file   Tobacco Use   • Smoking status: Never   • Smokeless tobacco: Never   Vaping Use   • Vaping Use: Never used   Substance and Sexual Activity   • Alcohol use: Yes     Comment: rare   • Drug use: No   • Sexual activity: Yes     Partners: Male     Birth control/protection: Male Sterilization   Other Topics Concern   • Not on file   Social History Narrative   • Not on file     Social Determinants of Health     Financial Resource Strain: Not on file   Food Insecurity: Not on file   Transportation Needs: Not on file   Physical Activity: Not on file   Stress: Not on file   Social Connections: Not on file   Intimate Partner Violence: Not on file   Housing Stability: Not on file       Review of Systems     ROS:  Constitutional: Negative for fatigue and unexpected weight change  Respiratory: Negative for cough and shortness of breath  Cardiovascular: Negative for chest pain and palpitations  Gastrointestinal: Negative for abdominal pain and change in bowel habits  Breasts:  Negative, other than as noted above  Genitourinary: Negative, other than as noted above  Psychiatric: Negative for mood difficulties        Objective         Vitals:    03/17/23 1019   BP: 118/74         Physical Examination:    Patient appears well and is not in distress  Neck is supple without masses, no cervical or supraclavicular lymphadenopathy  Cardiovascular: regular rate and rhythm; no murmurs  Lungs: clear to auscultation bilaterally; no wheezes  Breasts are symmetrical without mass, tenderness, nipple discharge, skin changes or adenopathy  Abdomen is soft and nontender without masses  External genitals are normal without lesions or rashes  Urethral meatus and urethra are normal  Bladder is normal to palpation  Vagina is normal without discharge or bleeding  Cervix is normal without discharge or lesion  Uterus is normal, mobile, nontender without palpable mass  Adnexa are normal, nontender, without palpable mass

## 2023-03-17 ENCOUNTER — ANNUAL EXAM (OUTPATIENT)
Dept: OBGYN CLINIC | Facility: CLINIC | Age: 63
End: 2023-03-17

## 2023-03-17 VITALS
DIASTOLIC BLOOD PRESSURE: 74 MMHG | HEIGHT: 66 IN | BODY MASS INDEX: 24.33 KG/M2 | WEIGHT: 151.4 LBS | SYSTOLIC BLOOD PRESSURE: 118 MMHG

## 2023-03-17 DIAGNOSIS — Z01.419 ENCNTR FOR GYN EXAM (GENERAL) (ROUTINE) W/O ABN FINDINGS: Primary | ICD-10-CM

## 2023-03-17 DIAGNOSIS — Z12.31 ENCOUNTER FOR SCREENING MAMMOGRAM FOR MALIGNANT NEOPLASM OF BREAST: ICD-10-CM

## 2023-03-17 RX ORDER — EMPAGLIFLOZIN 10 MG/1
10 TABLET, FILM COATED ORAL EVERY MORNING
COMMUNITY
Start: 2022-12-18

## 2023-03-17 RX ORDER — EZETIMIBE 10 MG/1
10 TABLET ORAL DAILY
COMMUNITY

## 2023-05-02 LAB
LEFT EYE DIABETIC RETINOPATHY: NORMAL
RIGHT EYE DIABETIC RETINOPATHY: NORMAL
SEVERITY (EYE EXAM): NORMAL

## 2023-05-09 ENCOUNTER — TRANSCRIBE ORDERS (OUTPATIENT)
Dept: LAB | Facility: CLINIC | Age: 63
End: 2023-05-09

## 2023-05-09 ENCOUNTER — APPOINTMENT (OUTPATIENT)
Dept: LAB | Facility: CLINIC | Age: 63
End: 2023-05-09

## 2023-05-09 DIAGNOSIS — E78.5 HYPERLIPIDEMIA, UNSPECIFIED HYPERLIPIDEMIA TYPE: Primary | ICD-10-CM

## 2023-05-09 DIAGNOSIS — E78.5 HYPERLIPIDEMIA, UNSPECIFIED HYPERLIPIDEMIA TYPE: ICD-10-CM

## 2023-05-09 LAB
CHOLEST SERPL-MCNC: 117 MG/DL
HDLC SERPL-MCNC: 41 MG/DL
LDLC SERPL CALC-MCNC: 37 MG/DL (ref 0–100)
NONHDLC SERPL-MCNC: 76 MG/DL
TRIGL SERPL-MCNC: 196 MG/DL

## 2023-06-06 ENCOUNTER — OFFICE VISIT (OUTPATIENT)
Dept: CARDIOLOGY CLINIC | Facility: CLINIC | Age: 63
End: 2023-06-06
Payer: COMMERCIAL

## 2023-06-06 VITALS
OXYGEN SATURATION: 96 % | RESPIRATION RATE: 16 BRPM | WEIGHT: 152 LBS | SYSTOLIC BLOOD PRESSURE: 112 MMHG | DIASTOLIC BLOOD PRESSURE: 70 MMHG | HEIGHT: 66 IN | BODY MASS INDEX: 24.43 KG/M2 | HEART RATE: 70 BPM

## 2023-06-06 DIAGNOSIS — E78.2 MIXED HYPERLIPIDEMIA: ICD-10-CM

## 2023-06-06 DIAGNOSIS — I10 ESSENTIAL HYPERTENSION: Primary | ICD-10-CM

## 2023-06-06 DIAGNOSIS — Z82.49 FAMILY HISTORY OF ISCHEMIC HEART DISEASE (IHD): ICD-10-CM

## 2023-06-06 DIAGNOSIS — I47.1 SUPRAVENTRICULAR TACHYCARDIA (HCC): ICD-10-CM

## 2023-06-06 PROCEDURE — 93000 ELECTROCARDIOGRAM COMPLETE: CPT | Performed by: INTERNAL MEDICINE

## 2023-06-06 PROCEDURE — 99214 OFFICE O/P EST MOD 30 MIN: CPT | Performed by: INTERNAL MEDICINE

## 2023-06-06 RX ORDER — CRANBERRY FRUIT EXTRACT 200 MG
200 CAPSULE ORAL DAILY
COMMUNITY

## 2023-06-06 NOTE — ASSESSMENT & PLAN NOTE
Well-controlled  Had been high here and there had been more palpitations when on a lower dose of metoprolol so we will continue that

## 2023-06-06 NOTE — PROGRESS NOTES
" Patient ID: Flor Huerta is a 65 y o  female  Plan:      Supraventricular tachycardia [facility])  Ablation performed in May of 2016  No symptomatic recurrence  Essential hypertension  Well-controlled  Had been high here and there had been more palpitations when on a lower dose of metoprolol so we will continue that  Mixed hyperlipidemia  Values well controlled at present  Very strong family history of early CAD  Follow up Plan/Other summary comments:  Return in about 2 years (around 6/6/2025)  HPI: Patient is seen in follow-up today regarding the above  Since last visit she has felt well  No chest pain or chest pressure  No change in exertional capacity  She has cut back on some of her bad eating habits including cheese and cookies! Results for orders placed or performed in visit on 06/06/23   POCT ECG    Impression    Sinus rhythm  Within normal limits  Most recent or relevant cardiac/vascular testing:    Echocardiogram 05/24/2019: Within normal limits  Past Surgical History:   Procedure Laterality Date   • AV NODE ABLATION  05/10/2016    s/p successful AV iván ablation   • BREAST EXCISIONAL BIOPSY Right 1998   • BREAST LUMPECTOMY Left 1981   • CERVICAL CONE BIOPSY  1988   • COLONOSCOPY  2013       Lipid Profile:   Lab Results   Component Value Date    CHOL 223 07/24/2015    HDL 41 (L) 05/09/2023    HDL 39 07/24/2015    TRIG 196 (H) 05/09/2023    TRIG 280 07/24/2015         Review of Systems   10  point ROS  was otherwise non pertinent or negative except as per HPI or as below  Gait:  Normal         Objective:     /70 (BP Location: Left arm, Patient Position: Sitting, Cuff Size: Standard)   Pulse 70   Resp 16   Ht 5' 5 5\" (1 664 m)   Wt 68 9 kg (152 lb)   SpO2 96%   BMI 24 91 kg/m²     PHYSICAL EXAM:    General:  Normal appearance in no distress  Eyes:  Anicteric  Oral mucosa:  Moist   Neck:  No JVD  Carotid upstrokes are brisk without bruits    No " masses  Chest:  Clear to auscultation  Cardiac:  No palpable PMI  Normal S1 and S2  No murmur gallop or rub  Abdomen:  Soft and nontender  No palpable organomegaly or aortic enlargement  Extremities:  No peripheral edema  Musculoskeletal:  Symmetric  Vascular:  Femoral pulses are brisk without bruits  Popliteal pulses are intact bilaterally  Pedal pulses are intact  Neuro:  Grossly symmetric  Psych:  Alert and oriented x3  Current Outpatient Medications:   •  Biotin 2500 MCG CAPS, Take by mouth, Disp: , Rfl:   •  Calcium Carbonate-Vitamin D 600-400 MG-UNIT per tablet, Take 1 tablet by mouth, Disp: , Rfl:   •  Cholecalciferol 1000 units tablet, Take 6,000 Units by mouth, Disp: , Rfl:   •  Cranberry 200 MG CAPS, Take 200 mg by mouth daily, Disp: , Rfl:   •  ezetimibe (Zetia) 10 mg tablet, Take 10 mg by mouth daily, Disp: , Rfl:   •  FREESTYLE LITE test strip, daily Test, Disp: , Rfl:   •  Jardiance 10 MG TABS tablet, Take 10 mg by mouth every morning, Disp: , Rfl:   •  Lancets (freestyle) lancets, USE ONCE DAILY AS NEEDED, Disp: , Rfl:   •  lisinopril (ZESTRIL) 10 mg tablet, TAKE 1 TABLET BY MOUTH EVERY DAY, Disp: 90 tablet, Rfl: 3  •  metoprolol succinate (TOPROL-XL) 50 mg 24 hr tablet, TAKE 1 TABLET BY MOUTH EVERY DAY, Disp: 90 tablet, Rfl: 3  •  oxybutynin (DITROPAN) 5 mg tablet, Take 1 tablet (5 mg total) by mouth 3 (three) times a day as needed (bladder pain), Disp: 90 tablet, Rfl: 5  •  rosuvastatin (CRESTOR) 20 MG tablet, 40 mg daily, Disp: , Rfl:   No Known Allergies  Past Medical History:   Diagnosis Date   • Abnormal Pap smear of cervix    • Colon polyps    • Diabetes mellitus ([facility] [address] ) 08/2021    Taking jardiance 10mg   • Fibroadenoma 1993   • Forceps delivery     1994 daughter   • Headache    • History of cardiac monitoring 05/13/2006    Flutter in variety of situations    She did not feel any of the PVCs or PACs   • History of echocardiogram 03/11/2016    EF 55%, mild TR   • History of nuclear stress test 04/18/2006    Adenosine MPI:  Normal EKG response  No ischemia  Probable soft tissue attenuation  No definite evidence of scar     • HPV (human papilloma virus) infection    • Hyperlipemia    • Hypertension    • Mild dysplasia of cervix    • Palpitations    • SVT (supraventricular tachycardia) (HCC)    • Urinary tract infection    • Uses oral contraceptives            Social History     Tobacco Use   Smoking Status Never   Smokeless Tobacco Never

## 2023-06-21 ENCOUNTER — APPOINTMENT (EMERGENCY)
Dept: CT IMAGING | Facility: HOSPITAL | Age: 63
End: 2023-06-21
Payer: COMMERCIAL

## 2023-06-21 ENCOUNTER — APPOINTMENT (EMERGENCY)
Dept: RADIOLOGY | Facility: HOSPITAL | Age: 63
End: 2023-06-21
Payer: COMMERCIAL

## 2023-06-21 ENCOUNTER — HOSPITAL ENCOUNTER (EMERGENCY)
Facility: HOSPITAL | Age: 63
Discharge: HOME | End: 2023-06-21
Attending: EMERGENCY MEDICINE | Admitting: EMERGENCY MEDICINE
Payer: COMMERCIAL

## 2023-06-21 VITALS
DIASTOLIC BLOOD PRESSURE: 60 MMHG | SYSTOLIC BLOOD PRESSURE: 107 MMHG | RESPIRATION RATE: 18 BRPM | HEART RATE: 91 BPM | OXYGEN SATURATION: 93 % | TEMPERATURE: 97.2 F

## 2023-06-21 DIAGNOSIS — N84.0 ENDOMETRIAL POLYP: ICD-10-CM

## 2023-06-21 DIAGNOSIS — K65.4 MESENTERIC PANNICULITIS (HCC): Primary | ICD-10-CM

## 2023-06-21 LAB
ALBUMIN SERPL BCP-MCNC: 4.5 G/DL (ref 3.5–5)
ALP SERPL-CCNC: 60 U/L (ref 34–104)
ALT SERPL W P-5'-P-CCNC: 22 U/L (ref 7–52)
ANION GAP SERPL CALCULATED.3IONS-SCNC: 12 MMOL/L
AST SERPL W P-5'-P-CCNC: 16 U/L (ref 13–39)
BACTERIA UR QL AUTO: NORMAL /HPF
BASOPHILS # BLD MANUAL: 0 THOUSAND/UL (ref 0–0.1)
BASOPHILS NFR MAR MANUAL: 0 % (ref 0–1)
BILIRUB SERPL-MCNC: 0.88 MG/DL (ref 0.2–1)
BILIRUB UR QL STRIP: NEGATIVE
BUN SERPL-MCNC: 19 MG/DL (ref 5–25)
CALCIUM SERPL-MCNC: 9.4 MG/DL (ref 8.4–10.2)
CARDIAC TROPONIN I PNL SERPL HS: 2 NG/L (ref 8–18)
CHLORIDE SERPL-SCNC: 105 MMOL/L (ref 96–108)
CLARITY UR: CLEAR
CO2 SERPL-SCNC: 21 MMOL/L (ref 21–32)
COLOR UR: YELLOW
CREAT SERPL-MCNC: 0.56 MG/DL (ref 0.6–1.3)
EOSINOPHIL # BLD MANUAL: 0 THOUSAND/UL (ref 0–0.4)
EOSINOPHIL NFR BLD MANUAL: 0 % (ref 0–6)
ERYTHROCYTE [DISTWIDTH] IN BLOOD BY AUTOMATED COUNT: 12.3 % (ref 11.6–15.1)
FLUAV RNA RESP QL NAA+PROBE: NEGATIVE
FLUBV RNA RESP QL NAA+PROBE: NEGATIVE
GFR SERPL CREATININE-BSD FRML MDRD: 100 ML/MIN/1.73SQ M
GLUCOSE SERPL-MCNC: 165 MG/DL (ref 65–140)
GLUCOSE UR STRIP-MCNC: ABNORMAL MG/DL
HCT VFR BLD AUTO: 44.8 % (ref 34.8–46.1)
HGB BLD-MCNC: 15.1 G/DL (ref 11.5–15.4)
HGB UR QL STRIP.AUTO: NEGATIVE
KETONES UR STRIP-MCNC: NEGATIVE MG/DL
LEUKOCYTE ESTERASE UR QL STRIP: ABNORMAL
LIPASE SERPL-CCNC: 29 U/L (ref 11–82)
LYMPHOCYTES # BLD AUTO: 0.37 THOUSAND/UL (ref 0.6–4.47)
LYMPHOCYTES # BLD AUTO: 4 % (ref 14–44)
MCH RBC QN AUTO: 30.9 PG (ref 26.8–34.3)
MCHC RBC AUTO-ENTMCNC: 33.7 G/DL (ref 31.4–37.4)
MCV RBC AUTO: 92 FL (ref 82–98)
MONOCYTES # BLD AUTO: 0.28 THOUSAND/UL (ref 0–1.22)
MONOCYTES NFR BLD: 3 % (ref 4–12)
NEUTROPHILS # BLD MANUAL: 8.57 THOUSAND/UL (ref 1.85–7.62)
NEUTS BAND NFR BLD MANUAL: 9 % (ref 0–8)
NEUTS SEG NFR BLD AUTO: 84 % (ref 43–75)
NITRITE UR QL STRIP: NEGATIVE
NON-SQ EPI CELLS URNS QL MICRO: NORMAL /HPF
PH UR STRIP.AUTO: 5 [PH]
PLATELET # BLD AUTO: 237 THOUSANDS/UL (ref 149–390)
PLATELET BLD QL SMEAR: ADEQUATE
PMV BLD AUTO: 10.2 FL (ref 8.9–12.7)
POTASSIUM SERPL-SCNC: 3.9 MMOL/L (ref 3.5–5.3)
PROT SERPL-MCNC: 7.3 G/DL (ref 6.4–8.4)
PROT UR STRIP-MCNC: NEGATIVE MG/DL
RBC # BLD AUTO: 4.88 MILLION/UL (ref 3.81–5.12)
RBC #/AREA URNS AUTO: NORMAL /HPF
RSV RNA RESP QL NAA+PROBE: NEGATIVE
SARS-COV-2 RNA RESP QL NAA+PROBE: NEGATIVE
SODIUM SERPL-SCNC: 138 MMOL/L (ref 135–147)
SP GR UR STRIP.AUTO: <=1.005 (ref 1–1.03)
TSH SERPL DL<=0.05 MIU/L-ACNC: 0.61 UIU/ML (ref 0.45–4.5)
UROBILINOGEN UR QL STRIP.AUTO: 0.2 E.U./DL
WBC # BLD AUTO: 9.22 THOUSAND/UL (ref 4.31–10.16)
WBC #/AREA URNS AUTO: NORMAL /HPF

## 2023-06-21 PROCEDURE — 84443 ASSAY THYROID STIM HORMONE: CPT | Performed by: PHYSICIAN ASSISTANT

## 2023-06-21 PROCEDURE — 71045 X-RAY EXAM CHEST 1 VIEW: CPT

## 2023-06-21 PROCEDURE — G1004 CDSM NDSC: HCPCS

## 2023-06-21 PROCEDURE — 0241U HB NFCT DS VIR RESP RNA 4 TRGT: CPT | Performed by: PHYSICIAN ASSISTANT

## 2023-06-21 PROCEDURE — 85007 BL SMEAR W/DIFF WBC COUNT: CPT | Performed by: PHYSICIAN ASSISTANT

## 2023-06-21 PROCEDURE — 85027 COMPLETE CBC AUTOMATED: CPT | Performed by: PHYSICIAN ASSISTANT

## 2023-06-21 PROCEDURE — 81001 URINALYSIS AUTO W/SCOPE: CPT | Performed by: PHYSICIAN ASSISTANT

## 2023-06-21 PROCEDURE — 83690 ASSAY OF LIPASE: CPT | Performed by: PHYSICIAN ASSISTANT

## 2023-06-21 PROCEDURE — 84484 ASSAY OF TROPONIN QUANT: CPT | Performed by: PHYSICIAN ASSISTANT

## 2023-06-21 PROCEDURE — 80053 COMPREHEN METABOLIC PANEL: CPT | Performed by: PHYSICIAN ASSISTANT

## 2023-06-21 PROCEDURE — 36415 COLL VENOUS BLD VENIPUNCTURE: CPT | Performed by: PHYSICIAN ASSISTANT

## 2023-06-21 PROCEDURE — 74177 CT ABD & PELVIS W/CONTRAST: CPT

## 2023-06-21 PROCEDURE — 93005 ELECTROCARDIOGRAM TRACING: CPT

## 2023-06-21 RX ORDER — ONDANSETRON 2 MG/ML
4 INJECTION INTRAMUSCULAR; INTRAVENOUS ONCE
Status: COMPLETED | OUTPATIENT
Start: 2023-06-21 | End: 2023-06-21

## 2023-06-21 RX ADMIN — IOHEXOL 100 ML: 350 INJECTION, SOLUTION INTRAVENOUS at 11:20

## 2023-06-21 RX ADMIN — ONDANSETRON 4 MG: 2 INJECTION INTRAMUSCULAR; INTRAVENOUS at 10:48

## 2023-06-21 RX ADMIN — SODIUM CHLORIDE 1000 ML: 0.9 INJECTION, SOLUTION INTRAVENOUS at 10:48

## 2023-06-21 NOTE — ED PROVIDER NOTES
History  Chief Complaint   Patient presents with   • Dizziness     Pt states she was dizzy for 2 days and then last night she started with upper abdominal pain and nausea  Patient presents to the emergency department today for evaluation of lightheadedness, feeling off balance, epigastric abdominal pain  She also has complained of a headache  This is a pleasant 58-year-old female history of diabetes mellitus  She initially started 2 days ago with feeling lightheaded, she felt some unsteadiness with ambulating however no true vertiginous symptoms  She had a generalized headache last evening that was relieved with Tylenol no current headache  She states last evening she began with a pain in the epigastric region of the abdomen with nausea  She did not vomit  She denies diarrhea constipation black or red stool no urinary urgency frequency burning no fevers  Blood sugars have been normal   Mild tachycardia noted here otherwise vital signs within normal limits she called primary care who sent her over here today  Prior to Admission Medications   Prescriptions Last Dose Informant Patient Reported? Taking?    Biotin 2500 MCG CAPS  Self Yes No   Sig: Take by mouth   Calcium Carbonate-Vitamin D 600-400 MG-UNIT per tablet  Self Yes No   Sig: Take 1 tablet by mouth   Cholecalciferol 1000 units tablet  Self Yes No   Sig: Take 6,000 Units by mouth   Cranberry 200 MG CAPS  Self Yes No   Sig: Take 200 mg by mouth daily   FREESTYLE LITE test strip  Self Yes No   Sig: daily Test   Jardiance 10 MG TABS tablet  Self Yes No   Sig: Take 10 mg by mouth every morning   Lancets (freestyle) lancets  Self Yes No   Sig: USE ONCE DAILY AS NEEDED   ezetimibe (Zetia) 10 mg tablet  Self Yes No   Sig: Take 10 mg by mouth daily   lisinopril (ZESTRIL) 10 mg tablet  Self No No   Sig: TAKE 1 TABLET BY MOUTH EVERY DAY   metoprolol succinate (TOPROL-XL) 50 mg 24 hr tablet  Self No No   Sig: TAKE 1 TABLET BY MOUTH EVERY DAY oxybutynin (DITROPAN) 5 mg tablet  Self No No   Sig: Take 1 tablet (5 mg total) by mouth 3 (three) times a day as needed (bladder pain)   rosuvastatin (CRESTOR) 20 MG tablet  Self Yes No   Si mg daily      Facility-Administered Medications: None       Past Medical History:   Diagnosis Date   • Abnormal Pap smear of cervix    • Colon polyps    • Diabetes mellitus ([facility] [address] ) 2021    Taking jardiance 10mg   • Fibroadenoma    • Forceps delivery      daughter   • Headache    • History of cardiac monitoring 2006    Flutter in variety of situations  She did not feel any of the PVCs or PACs   • History of echocardiogram 2016    EF 55%, mild TR   • History of nuclear stress test 2006    Adenosine MPI:  Normal EKG response  No ischemia  Probable soft tissue attenuation  No definite evidence of scar  • HPV (human papilloma virus) infection    • Hyperlipemia    • Hypertension    • Mild dysplasia of cervix    • Palpitations    • SVT (supraventricular tachycardia) (HCC)    • Urinary tract infection    • Uses oral contraceptives        Past Surgical History:   Procedure Laterality Date   • AV NODE ABLATION  05/10/2016    s/p successful AV iván ablation   • BREAST EXCISIONAL BIOPSY Right    • BREAST LUMPECTOMY Left    • CERVICAL CONE BIOPSY     • COLONOSCOPY         Family History   Problem Relation Age of Onset   • Bone cancer Mother    • Hypertension Mother            • Multiple myeloma Mother    • Dementia Father    • Diabetes Father            • Seizures Father    • Heart disease Father         MI in his 62s   • Hyperlipidemia Father    • Transient ischemic attack Father    • Uterine cancer Maternal Aunt    • Breast cancer Maternal Aunt    • Colon cancer Maternal Aunt    • Breast cancer Paternal Aunt    • Cervical cancer Neg Hx      I have reviewed and agree with the history as documented      E-Cigarette/Vaping   • E-Cigarette Use Never User E-Cigarette/Vaping Substances   • Nicotine No    • THC No    • CBD No    • Flavoring No    • Other No    • Unknown No      Social History     Tobacco Use   • Smoking status: Never   • Smokeless tobacco: Never   Vaping Use   • Vaping Use: Never used   Substance Use Topics   • Alcohol use: Yes     Comment: Only drink socially which is not often   • Drug use: No       Review of Systems   Constitutional: Negative for chills and fever  HENT: Negative for ear pain and sore throat  Eyes: Negative for pain and visual disturbance  Respiratory: Negative for cough and shortness of breath  Cardiovascular: Negative for chest pain and palpitations  Gastrointestinal: Positive for nausea  Negative for abdominal pain, constipation, diarrhea and vomiting  Genitourinary: Negative for dysuria and hematuria  Musculoskeletal: Negative for arthralgias and back pain  Skin: Negative for color change and rash  Neurological: Positive for light-headedness and headaches  Negative for seizures and syncope  All other systems reviewed and are negative  Physical Exam  Physical Exam  Vitals reviewed  Constitutional:       General: She is not in acute distress  Appearance: She is well-developed  She is not ill-appearing, toxic-appearing or diaphoretic  HENT:      Right Ear: External ear normal  No swelling  Tympanic membrane is not bulging  Left Ear: External ear normal  No swelling  Tympanic membrane is not bulging  Nose: Nose normal       Mouth/Throat:      Pharynx: No oropharyngeal exudate  Eyes:      General: Lids are normal       Conjunctiva/sclera: Conjunctivae normal       Pupils: Pupils are equal, round, and reactive to light  Neck:      Thyroid: No thyromegaly  Vascular: No JVD  Trachea: No tracheal deviation  Cardiovascular:      Rate and Rhythm: Normal rate and regular rhythm  Pulses: Normal pulses  Heart sounds: Normal heart sounds  No murmur heard       No friction rub  No gallop  Pulmonary:      Effort: Pulmonary effort is normal  No respiratory distress  Breath sounds: Normal breath sounds  No stridor  No wheezing or rales  Chest:      Chest wall: No tenderness  Abdominal:      General: Bowel sounds are normal  There is no distension  Palpations: Abdomen is soft  There is no mass  Tenderness: There is no abdominal tenderness  There is no guarding or rebound  Negative signs include Marley's sign  Hernia: No hernia is present  Musculoskeletal:         General: No tenderness  Normal range of motion  Cervical back: Normal range of motion and neck supple  No edema  Normal range of motion  Lymphadenopathy:      Cervical: No cervical adenopathy  Skin:     General: Skin is warm and dry  Capillary Refill: Capillary refill takes less than 2 seconds  Coloration: Skin is not pale  Findings: No erythema or rash  Neurological:      General: No focal deficit present  Mental Status: She is alert and oriented to person, place, and time  GCS: GCS eye subscore is 4  GCS verbal subscore is 5  GCS motor subscore is 6  Cranial Nerves: No cranial nerve deficit  Sensory: No sensory deficit  Deep Tendon Reflexes: Reflexes are normal and symmetric  Psychiatric:         Mood and Affect: Mood normal          Speech: Speech normal          Behavior: Behavior normal          Thought Content:  Thought content normal          Judgment: Judgment normal          Vital Signs  ED Triage Vitals   Temperature Pulse Respirations Blood Pressure SpO2   06/21/23 1112 06/21/23 1015 06/21/23 1100 06/21/23 1015 06/21/23 1015   (!) 97 2 °F (36 2 °C) (!) 106 18 133/67 94 %      Temp Source Heart Rate Source Patient Position - Orthostatic VS BP Location FiO2 (%)   06/21/23 1112 06/21/23 1100 06/21/23 1100 -- --   Temporal Monitor Sitting        Pain Score       --                  Vitals:    06/21/23 1015 06/21/23 1100   BP: 133/67 107/60 Pulse: (!) 106 91   Patient Position - Orthostatic VS:  Sitting         Visual Acuity      ED Medications  Medications   sodium chloride 0 9 % bolus 1,000 mL (1,000 mL Intravenous New Bag 6/21/23 1048)   ondansetron (ZOFRAN) injection 4 mg (4 mg Intravenous Given 6/21/23 1048)   iohexol (OMNIPAQUE) 350 MG/ML injection (SINGLE-DOSE) 100 mL (100 mL Intravenous Given 6/21/23 1120)       Diagnostic Studies  Results Reviewed     Procedure Component Value Units Date/Time    Urine Microscopic [[id number]]  (Normal) Collected: 06/21/23 1236    Lab Status: Final result Specimen: Urine, Clean Catch Updated: 06/21/23 1302     RBC, UA None Seen /hpf      WBC, UA None Seen /hpf      Epithelial Cells Occasional /hpf      Bacteria, UA None Seen /hpf     UA (URINE) with reflex to Scope [[id number]]  (Abnormal) Collected: 06/21/23 1236    Lab Status: Final result Specimen: Urine, Clean Catch Updated: 06/21/23 1243     Color, UA Yellow     Clarity, UA Clear     Specific Gravity, UA <=1 005     pH, UA 5 0     Leukocytes, UA Elevated glucose may cause decreased leukocyte values  See urine microscopic for UWBC result     Nitrite, UA Negative     Protein, UA Negative mg/dl      Glucose, UA >=1000 (1%) mg/dl      Ketones, UA Negative mg/dl      Urobilinogen, UA 0 2 E U /dl      Bilirubin, UA Negative     Occult Blood, UA Negative    FLU/RSV/COVID - if FLU/RSV clinically relevant [[id number]]  (Normal) Collected: 06/21/23 1041    Lab Status: Final result Specimen: Nares from Nose Updated: 06/21/23 1128     SARS-CoV-2 Negative     INFLUENZA A PCR Negative     INFLUENZA B PCR Negative     RSV PCR Negative    Narrative:      FOR PEDIATRIC PATIENTS - copy/paste COVID Guidelines URL to browser: [email or web address]    SARS-CoV-2 assay is a Nucleic Acid Amplification assay intended for the  qualitative detection of nucleic acid from SARS-CoV-2 in nasopharyngeal  swabs   Results are for the presumptive identification of SARS-CoV-2 RNA  Positive results are indicative of infection with SARS-CoV-2, the virus  causing COVID-19, but do not rule out bacterial infection or co-infection  with other viruses  Laboratories within the [place] and its  territories are required to report all positive results to the appropriate  public health authorities  Negative results do not preclude SARS-CoV-2  infection and should not be used as the sole basis for treatment or other  patient management decisions  Negative results must be combined with  clinical observations, patient history, and epidemiological information  This test has not been FDA cleared or approved  This test has been authorized by FDA under an Emergency Use Authorization  (EUA)  This test is only authorized for the duration of time the  declaration that circumstances exist justifying the authorization of the  emergency use of an in vitro diagnostic tests for detection of SARS-CoV-2  virus and/or diagnosis of COVID-19 infection under section 564(b)(1) of  the Act, 21 U  S C  592MOA-6(Z)(1), unless the authorization is terminated  or revoked sooner  The test has been validated but independent review by FDA  and CLIA is pending  Test performed using [employer] GeneXpert: This RT-PCR assay targets N2,  a region unique to SARS-CoV-2  A conserved region in the E-gene was chosen  for pan-Sarbecovirus detection which includes SARS-CoV-2  According to CMS-2020-01-R, this platform meets the definition of high-throughput technology      High Sensitivity Troponin I Random [[id number]]  (Abnormal) Collected: 06/21/23 1041    Lab Status: Final result Specimen: Blood from Arm, Right Updated: 06/21/23 1127     HS TnI random 2 ng/L     TSH [[id number]]  (Normal) Collected: 06/21/23 1041    Lab Status: Final result Specimen: Blood from Arm, Right Updated: 06/21/23 1118     TSH 3RD GENERATON 0 613 uIU/mL     Comprehensive metabolic panel [[id number]]  (Abnormal) Collected: 06/21/23 1041    Lab Status: Final result Specimen: Blood from Arm, Right Updated: 06/21/23 1110     Sodium 138 mmol/L      Potassium 3 9 mmol/L      Chloride 105 mmol/L      CO2 21 mmol/L      ANION GAP 12 mmol/L      BUN 19 mg/dL      Creatinine 0 56 mg/dL      Glucose 165 mg/dL      Calcium 9 4 mg/dL      AST 16 U/L      ALT 22 U/L      Alkaline Phosphatase 60 U/L      Total Protein 7 3 g/dL      Albumin 4 5 g/dL      Total Bilirubin 0 88 mg/dL      eGFR 100 ml/min/1 73sq m     Narrative:      [facility] guidelines for Chronic Kidney Disease (CKD):   •  Stage 1 with normal or high GFR (GFR > 90 mL/min/1 73 square meters)  •  Stage 2 Mild CKD (GFR = 60-89 mL/min/1 73 square meters)  •  Stage 3A Moderate CKD (GFR = 45-59 mL/min/1 73 square meters)  •  Stage 3B Moderate CKD (GFR = 30-44 mL/min/1 73 square meters)  •  Stage 4 Severe CKD (GFR = 15-29 mL/min/1 73 square meters)  •  Stage 5 End Stage CKD (GFR <15 mL/min/1 73 square meters)  Note: GFR calculation is accurate only with a steady state creatinine    Lipase [[id number]]  (Normal) Collected: 06/21/23 1041    Lab Status: Final result Specimen: Blood from Arm, Right Updated: 06/21/23 1110     Lipase 29 u/L     CBC and differential [[id number]]  (Normal) Collected: 06/21/23 1041    Lab Status: Final result Specimen: Blood from Arm, Right Updated: 06/21/23 1103     WBC 9 22 Thousand/uL      RBC 4 88 Million/uL      Hemoglobin 15 1 g/dL      Hematocrit 44 8 %      MCV 92 fL      MCH 30 9 pg      MCHC 33 7 g/dL      RDW 12 3 %      MPV 10 2 fL      Platelets 373 Thousands/uL     Narrative: This is an appended report  These results have been appended to a previously verified report      Manual Differential(PHLEBS Do Not Order) [[id number]]  (Abnormal) Collected: 06/21/23 1041    Lab Status: Final result Specimen: Blood from Arm, Right Updated: 06/21/23 1103     Segmented % 84 %      Bands % 9 %      Lymphocytes % 4 % Monocytes % 3 %      Eosinophils, % 0 %      Basophils % 0 %      Absolute Neutrophils 8 57 Thousand/uL      Lymphocytes Absolute 0 37 Thousand/uL      Monocytes Absolute 0 28 Thousand/uL      Eosinophils Absolute 0 00 Thousand/uL      Basophils Absolute 0 00 Thousand/uL      Total Counted --     Platelet Estimate Adequate                 CT abdomen pelvis with contrast   Final Result by Mac Templeton MD (06/21 1154)      1  No definite acute abnormality in the abdomen or pelvis  2   Tiny focus of gas in the urinary bladder  Correlation with recent instrumentation recommended  3   Subcentimeter focus of apparent enhancement in the endometrium  Polyp/neoplasm not excluded  Further evaluation with nonemergent ultrasound is recommended  4   Small bowel mesenteric stranding with increased number of prominent to the nonpathologically enlarged lymph nodes  This likely represents mild mesenteric panniculitis though there are no prior comparison studies to establish stability  As an early    lymphoproliferative disease can appear similar, a 6-month follow-up CT of the abdomen/pelvis is recommended  The study was marked in [facility] for immediate notification  Workstation performed: [id number]         XR chest 1 view portable   ED Interpretation by Kev Waggoner PA-C (06/21 1134)   No evidence of acute cardiopulmonary process      Final Result by Frank Gomez MD (06/21 1220)      No acute cardiopulmonary disease  No free subdiaphragmatic air                    Workstation performed: [id number]                    Procedures  ECG 12 Lead Documentation Only    Date/Time: 6/21/2023 10:36 AM    Performed by: Kev Waggoner PA-C  Authorized by: Kev Waggoner PA-C    Indications / Diagnosis:  Epigastric pain  ECG reviewed by me, the ED Provider: yes    Patient location:  ED  Interpretation:     Interpretation: non-specific    Rate:     ECG rate:  107    ECG rate assessment: tachycardic    Rhythm:     Rhythm: sinus tachycardia    Ectopy:     Ectopy: none    QRS:     QRS axis:  Normal    QRS intervals:  Normal  Conduction:     Conduction: normal    ST segments:     ST segments:  Normal  T waves:     T waves: normal               ED Course  ED Course as of 06/21/23 1330   Wed Jun 21, 2023   1059 WBC: 9 22   1059 Hemoglobin: 15 1   1059 Platelet Count: 650   1059 CBC reviewed within normal limits   1132 RSV PCR: Negative   1132 INFLU B PCR: Negative   1132 INFLU A PCR: Negative   1132 SARS-COV-2: Negative   1132 HS TnI random(!): 2   1132 TSH 3RD GENERATON: 0 613   1133 eGFR: 100   1134 Waiting CT abdomen interpretation   1210 IMPRESSION:     1  No definite acute abnormality in the abdomen or pelvis      2   Tiny focus of gas in the urinary bladder  Correlation with recent instrumentation recommended      3   Subcentimeter focus of apparent enhancement in the endometrium  Polyp/neoplasm not excluded  Further evaluation with nonemergent ultrasound is recommended      4   Small bowel mesenteric stranding with increased number of prominent to the nonpathologically enlarged lymph nodes  This likely represents mild mesenteric panniculitis though there are no prior comparison studies to establish stability  As an early   lymphoproliferative disease can appear similar, a 6-month follow-up CT of the abdomen/pelvis is recommended  1255 IMPRESSION:     1  No definite acute abnormality in the abdomen or pelvis      2   Tiny focus of gas in the urinary bladder  Correlation with recent instrumentation recommended      3   Subcentimeter focus of apparent enhancement in the endometrium  Polyp/neoplasm not excluded  Further evaluation with nonemergent ultrasound is recommended      4   Small bowel mesenteric stranding with increased number of prominent to the nonpathologically enlarged lymph nodes   This likely represents mild mesenteric panniculitis though there are no prior comparison studies to establish stability  As an early   lymphoproliferative disease can appear similar, a 6-month follow-up CT of the abdomen/pelvis is recommended  SBIRT 20yo+    Flowsheet Row Most Recent Value   Initial Alcohol Screen: US AUDIT-C     1  How often do you have a drink containing alcohol? 0 Filed at: 06/21/2023 1112   2  How many drinks containing alcohol do you have on a typical day you are drinking? 0 Filed at: 06/21/2023 1112   3b  FEMALE Any Age, or MALE 65+: How often do you have 4 or more drinks on one occassion? 0 Filed at: 06/21/2023 1112   Audit-C Score 0 Filed at: 06/21/2023 1112   FCO: How many times in the past year have you    Used an illegal drug or used a prescription medication for non-medical reasons? Never Filed at: 06/21/2023 1112                    Medical Decision Making  This is a 71-year-old female here for evaluation, sent in by primary care for epigastric pain and nausea generalized headache lightheadedness  Well-appearing at bedside mild sinus tachycardia noted  We will evaluate for acute coronary syndrome, dehydration, acute kidney injury, cholecystitis gastritis  CT scan notes endometrial polyp I discussed with the patient and her family member at bedside for the need for follow-up with OB/GYN and pelvic ultrasound she verbalized understanding and agreement  CT scan also notes what appears to be nonpathologically enlarged lymph nodes as well as some mesenteric inflammation consistent with mesenteric panniculitis, she needs to ensure that CT scan in 6 months is ordered to make sure that the situation has resolved this should be ordered by primary care we discussed this at bedside as well  She is otherwise stable for discharge  She is on Jardiance which explains the  elevated glucose and leukocytes in the urine  Amount and/or Complexity of Data Reviewed  Labs: ordered  Decision-making details documented in ED Course    Radiology: ordered and independent interpretation performed  Risk  Prescription drug management  Disposition  Final diagnoses:   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[address] )   Endometrial polyp     Time reflects when diagnosis was documented in both MDM as applicable and the Disposition within this note     Time User Action Codes Description Comment    6/21/2023 12:56 PM Ramses [place] Add [K65 4] 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[address] )     6/21/2023  1:23 PM Ramses [place] Add [N84 0] Endometrial polyp       ED Disposition     ED Disposition   Discharge    Condition   Stable    Date/Time   Wed Jun 21, 2023 12:55 PM    Rachel discharge to home/self care  Follow-up Information     Follow up With Specialties Details Why Caroline Pal MD Family Medicine  follow up CT scan in 6 months to ensure resolution of the mesenteric inflammation and lymph nodes  [address]  [phone]      OBGYN  Go to  you should have follow up pelvic ultasounf to evaluate the endometrial polyp  Patient's Medications   Discharge Prescriptions    No medications on file       No discharge procedures on file      PDMP Review     None          ED Provider  Electronically Signed by           Paulette Blackmon PA-C  06/21/23 2062

## 2023-06-22 DIAGNOSIS — I10 ESSENTIAL HYPERTENSION: ICD-10-CM

## 2023-06-22 RX ORDER — METOPROLOL SUCCINATE 50 MG/1
TABLET, EXTENDED RELEASE ORAL
Qty: 90 TABLET | Refills: 3 | Status: SHIPPED | OUTPATIENT
Start: 2023-06-22

## 2023-06-23 LAB
ATRIAL RATE: 107 BPM
P AXIS: 39 DEGREES
PR INTERVAL: 168 MS
QRS AXIS: 15 DEGREES
QRSD INTERVAL: 110 MS
QT INTERVAL: 354 MS
QTC INTERVAL: 472 MS
T WAVE AXIS: 46 DEGREES
VENTRICULAR RATE: 107 BPM

## 2023-06-23 PROCEDURE — 93010 ELECTROCARDIOGRAM REPORT: CPT | Performed by: INTERNAL MEDICINE

## 2023-08-15 ENCOUNTER — TRANSCRIBE ORDERS (OUTPATIENT)
Dept: LAB | Facility: CLINIC | Age: 63
End: 2023-08-15

## 2023-08-15 ENCOUNTER — APPOINTMENT (OUTPATIENT)
Dept: LAB | Facility: CLINIC | Age: 63
End: 2023-08-15
Payer: COMMERCIAL

## 2023-08-15 DIAGNOSIS — E55.9 VITAMIN D DEFICIENCY: ICD-10-CM

## 2023-08-15 DIAGNOSIS — E13.69 OTHER SPECIFIED DIABETES MELLITUS WITH OTHER SPECIFIED COMPLICATION, UNSPECIFIED WHETHER LONG TERM INSULIN USE (HCC): ICD-10-CM

## 2023-08-15 DIAGNOSIS — E13.69 OTHER SPECIFIED DIABETES MELLITUS WITH OTHER SPECIFIED COMPLICATION, UNSPECIFIED WHETHER LONG TERM INSULIN USE (HCC): Primary | ICD-10-CM

## 2023-08-15 DIAGNOSIS — R51.9 NONINTRACTABLE HEADACHE, UNSPECIFIED CHRONICITY PATTERN, UNSPECIFIED HEADACHE TYPE: ICD-10-CM

## 2023-08-15 DIAGNOSIS — E78.5 HYPERLIPIDEMIA, UNSPECIFIED HYPERLIPIDEMIA TYPE: ICD-10-CM

## 2023-08-15 LAB
25(OH)D3 SERPL-MCNC: 40.6 NG/ML (ref 30–100)
ALBUMIN SERPL BCP-MCNC: 4.2 G/DL (ref 3.5–5)
ALP SERPL-CCNC: 62 U/L (ref 46–116)
ALT SERPL W P-5'-P-CCNC: 30 U/L (ref 12–78)
ANION GAP SERPL CALCULATED.3IONS-SCNC: 8 MMOL/L
AST SERPL W P-5'-P-CCNC: 15 U/L (ref 5–45)
BASOPHILS # BLD AUTO: 0.08 THOUSANDS/ÂΜL (ref 0–0.1)
BASOPHILS NFR BLD AUTO: 1 % (ref 0–1)
BILIRUB SERPL-MCNC: 0.58 MG/DL (ref 0.2–1)
BUN SERPL-MCNC: 13 MG/DL (ref 5–25)
CALCIUM SERPL-MCNC: 9.8 MG/DL (ref 8.3–10.1)
CHLORIDE SERPL-SCNC: 110 MMOL/L (ref 96–108)
CO2 SERPL-SCNC: 24 MMOL/L (ref 21–32)
CREAT SERPL-MCNC: 0.68 MG/DL (ref 0.6–1.3)
CREAT UR-MCNC: 88.9 MG/DL
EOSINOPHIL # BLD AUTO: 0.16 THOUSAND/ÂΜL (ref 0–0.61)
EOSINOPHIL NFR BLD AUTO: 3 % (ref 0–6)
ERYTHROCYTE [DISTWIDTH] IN BLOOD BY AUTOMATED COUNT: 12.7 % (ref 11.6–15.1)
EST. AVERAGE GLUCOSE BLD GHB EST-MCNC: 143 MG/DL
GFR SERPL CREATININE-BSD FRML MDRD: 94 ML/MIN/1.73SQ M
GLUCOSE P FAST SERPL-MCNC: 134 MG/DL (ref 65–99)
HBA1C MFR BLD: 6.6 %
HCT VFR BLD AUTO: 41.1 % (ref 34.8–46.1)
HGB BLD-MCNC: 13.9 G/DL (ref 11.5–15.4)
IMM GRANULOCYTES # BLD AUTO: 0.02 THOUSAND/UL (ref 0–0.2)
IMM GRANULOCYTES NFR BLD AUTO: 0 % (ref 0–2)
LYMPHOCYTES # BLD AUTO: 2.76 THOUSANDS/ÂΜL (ref 0.6–4.47)
LYMPHOCYTES NFR BLD AUTO: 44 % (ref 14–44)
MCH RBC QN AUTO: 31.6 PG (ref 26.8–34.3)
MCHC RBC AUTO-ENTMCNC: 33.8 G/DL (ref 31.4–37.4)
MCV RBC AUTO: 93 FL (ref 82–98)
MICROALBUMIN UR-MCNC: 11.2 MG/L (ref 0–20)
MICROALBUMIN/CREAT 24H UR: 13 MG/G CREATININE (ref 0–30)
MONOCYTES # BLD AUTO: 0.4 THOUSAND/ÂΜL (ref 0.17–1.22)
MONOCYTES NFR BLD AUTO: 6 % (ref 4–12)
NEUTROPHILS # BLD AUTO: 2.86 THOUSANDS/ÂΜL (ref 1.85–7.62)
NEUTS SEG NFR BLD AUTO: 46 % (ref 43–75)
NRBC BLD AUTO-RTO: 0 /100 WBCS
PLATELET # BLD AUTO: 280 THOUSANDS/UL (ref 149–390)
PMV BLD AUTO: 11.1 FL (ref 8.9–12.7)
POTASSIUM SERPL-SCNC: 4.1 MMOL/L (ref 3.5–5.3)
PROT SERPL-MCNC: 7.6 G/DL (ref 6.4–8.4)
RBC # BLD AUTO: 4.4 MILLION/UL (ref 3.81–5.12)
SODIUM SERPL-SCNC: 142 MMOL/L (ref 135–147)
T4 FREE SERPL-MCNC: 0.84 NG/DL (ref 0.61–1.12)
TSH SERPL DL<=0.05 MIU/L-ACNC: 1.06 UIU/ML (ref 0.45–4.5)
WBC # BLD AUTO: 6.28 THOUSAND/UL (ref 4.31–10.16)

## 2023-08-15 PROCEDURE — 82570 ASSAY OF URINE CREATININE: CPT

## 2023-08-15 PROCEDURE — 36415 COLL VENOUS BLD VENIPUNCTURE: CPT

## 2023-08-15 PROCEDURE — 82043 UR ALBUMIN QUANTITATIVE: CPT

## 2023-08-15 PROCEDURE — 84439 ASSAY OF FREE THYROXINE: CPT

## 2023-08-15 PROCEDURE — 85025 COMPLETE CBC W/AUTO DIFF WBC: CPT

## 2023-08-15 PROCEDURE — 80053 COMPREHEN METABOLIC PANEL: CPT

## 2023-08-15 PROCEDURE — 82306 VITAMIN D 25 HYDROXY: CPT

## 2023-08-15 PROCEDURE — 83036 HEMOGLOBIN GLYCOSYLATED A1C: CPT

## 2023-08-15 PROCEDURE — 84443 ASSAY THYROID STIM HORMONE: CPT

## 2023-08-16 ENCOUNTER — TELEPHONE (OUTPATIENT)
Dept: ADMINISTRATIVE | Facility: OTHER | Age: 63
End: 2023-08-16

## 2023-08-16 NOTE — TELEPHONE ENCOUNTER
----- Message from Isabella Sees sent at 8/16/2023  9:02 AM EDT -----  Regarding: mammo  08/16/23 9:02 AM    Hello, our patient Megan Rm has had Mammogram completed/performed. Please assist in updating the patient chart by pulling the Care Everywhere (CE) document. The date of service is 01/06/2023.      Thank you,  Isabella Sees   [address]

## 2023-08-16 NOTE — TELEPHONE ENCOUNTER
Upon review of the In Basket request we were able to locate, review, and update the patient chart as requested for CRC: Colonoscopy and Mammogram.    Any additional questions or concerns should be emailed to the Practice Liaisons via the appropriate education email address, please do not reply via In Basket.     Thank you  Loretta Mijares MA

## 2023-08-16 NOTE — TELEPHONE ENCOUNTER
----- Message from Madhu Parrish sent at 8/16/2023  9:02 AM EDT -----  Regarding: colonoscopy  08/16/23 9:03 AM    Hello, our patient Ermelinda Harrell has had CRC: Colonoscopy completed/performed. Please assist in updating the patient chart by pulling the Care Everywhere (CE) document. The date of service is 08/27/2021.      Thank you,  Madhu Parrish   [address]

## 2023-08-21 ENCOUNTER — OFFICE VISIT (OUTPATIENT)
Dept: FAMILY MEDICINE CLINIC | Facility: CLINIC | Age: 63
End: 2023-08-21
Payer: COMMERCIAL

## 2023-08-21 VITALS
SYSTOLIC BLOOD PRESSURE: 98 MMHG | BODY MASS INDEX: 25.26 KG/M2 | DIASTOLIC BLOOD PRESSURE: 58 MMHG | TEMPERATURE: 97.9 F | HEIGHT: 65 IN | WEIGHT: 151.6 LBS

## 2023-08-21 DIAGNOSIS — N84.0 ENDOMETRIAL POLYP: ICD-10-CM

## 2023-08-21 DIAGNOSIS — E78.2 MIXED HYPERLIPIDEMIA: ICD-10-CM

## 2023-08-21 DIAGNOSIS — M79.3 PANNICULITIS: ICD-10-CM

## 2023-08-21 DIAGNOSIS — E11.9 TYPE 2 DIABETES MELLITUS WITHOUT COMPLICATION, WITHOUT LONG-TERM CURRENT USE OF INSULIN (HCC): ICD-10-CM

## 2023-08-21 DIAGNOSIS — E11.9 TYPE 2 DIABETES MELLITUS WITHOUT COMPLICATION, WITHOUT LONG-TERM CURRENT USE OF INSULIN (HCC): Primary | ICD-10-CM

## 2023-08-21 DIAGNOSIS — I10 ESSENTIAL HYPERTENSION: ICD-10-CM

## 2023-08-21 DIAGNOSIS — Z00.00 ENCOUNTER FOR ANNUAL PHYSICAL EXAM: Primary | ICD-10-CM

## 2023-08-21 DIAGNOSIS — I66.9: ICD-10-CM

## 2023-08-21 PROCEDURE — 99396 PREV VISIT EST AGE 40-64: CPT | Performed by: FAMILY MEDICINE

## 2023-08-21 RX ORDER — EZETIMIBE 10 MG/1
10 TABLET ORAL DAILY
Qty: 90 TABLET | Refills: 3 | Status: SHIPPED | OUTPATIENT
Start: 2023-08-21

## 2023-08-21 RX ORDER — EMPAGLIFLOZIN 10 MG/1
10 TABLET, FILM COATED ORAL EVERY MORNING
Qty: 90 TABLET | Refills: 3 | Status: SHIPPED | OUTPATIENT
Start: 2023-08-21

## 2023-08-21 NOTE — PROGRESS NOTES
[facility] PRIMARY CARE    NAME: Wilmer Gaona  AGE: 65 y.o. SEX: female  : 1960     DATE: 2023     Assessment and Plan:     Problem List Items Addressed This Visit        Endocrine    Type 2 diabetes mellitus without complication, without long-term current use of insulin ([address])     Patient was sent for a glucometer etc. through her insurance company. States her mother informs her if high or low sugars or not if she is having any symptomatology whatsoever. Due to worsened hemoglobin A1c I advised we continue same medication for now but get back on diet and exercise program and we will repeat labs in 3 months. No change at that time we will need to adjust the dosage of her medication. Lab Results   Component Value Date    HGBA1C 6.6 (H) 08/15/2023            Relevant Medications    Jardiance 10 MG TABS tablet       Cardiovascular and Mediastinum    Essential hypertension     She was also sent a free blood pressure cuff by her insurance company. She states home readings average 110 120/70-80. Other    Mixed hyperlipidemia     Cholesterol great although triglycerides elevated I am sure due to her worsened sugar control. As above emphasized need for diet and exercise program to continue same meds for now. Relevant Medications    ezetimibe (Zetia) 10 mg tablet   Other Visit Diagnoses     Encounter for annual physical exam    -  Primary    Encouraged back on diet and continue with her exercise program.  Advised updated Adacel, flu VAX this , new COVID booster when available. Panniculitis        Symptoms resolved BP CAT scan ordered for December. Endometrial polyp        Ultrasound ordered. Patient is without symptomatology. She does have routine follow-up with GYN in March. Immunizations and preventive care screenings were discussed with patient today.  Appropriate education was printed on patient's after visit summary. Counseling:  Alcohol/drug use: discussed moderation in alcohol intake, the recommendations for healthy alcohol use, and avoidance of illicit drug use. BMI Counseling: Body mass index is 25.23 kg/m². The BMI is above normal. Nutrition recommendations include decreasing portion sizes, encouraging healthy choices of fruits and vegetables, limiting drinks that contain sugar, moderation in carbohydrate intake and reducing intake of cholesterol. Exercise recommendations include moderate physical activity 150 minutes/week. No pharmacotherapy was ordered. Rationale for BMI follow-up plan is due to patient being overweight or obese. Depression Screening and Follow-up Plan: Patient was screened for depression during today's encounter. They screened negative with a PHQ-2 score of 0. No follow-ups on file. Chief Complaint:     Chief Complaint   Patient presents with   • Annual Exam      History of Present Illness:     Adult Annual Physical   Patient here for a comprehensive physical exam. The patient reports no problems. Diet and Physical Activity  Diet/Nutrition: well balanced diet. Exercise: walking. Depression Screening  PHQ-2/9 Depression Screening    Little interest or pleasure in doing things: 0 - not at all  Feeling down, depressed, or hopeless: 0 - not at all  PHQ-2 Score: 0  PHQ-2 Interpretation: Negative depression screen       General Health  Sleep: sleeps well. Hearing: normal - none . Vision: no vision problems. Dental: regular dental visits. /GYN Health  Patient is: postmenopausal  Last menstrual period:10 years  Contraceptive method: none. Review of Systems:   Patient has reviewed recent labs and blood sugars with her. Dates sugars seem to be higher in the morning despite no snacks before bed. She does admit to being off her diet over the summer with lots of picnics etc. however still is exercising walking daily.   He also had a recent eye exam with a visit to [facility] due to a corneal nevus which has been watched for the last 10 years. Cording to the eye doctor there are no diabetic changes in her eyes. 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 Repeat CAT scan in 6 months was advised. Review of Systems   Constitutional: Negative for chills and fever. HENT: Negative for ear pain and sore throat. Eyes: Negative for pain and visual disturbance. Respiratory: Negative for cough and shortness of breath. Cardiovascular: Negative for chest pain and palpitations. Gastrointestinal: Negative for abdominal distention, abdominal pain, blood in stool, nausea and vomiting. Genitourinary: Negative for dysuria and hematuria. Musculoskeletal: Negative for arthralgias and back pain. Skin: Negative for color change and rash. Neurological: Negative for dizziness, seizures, syncope and light-headedness. All other systems reviewed and are negative. Past Medical History:     Past Medical History:   Diagnosis Date   • Abnormal Pap smear of cervix    • Colon polyps    • Diabetes mellitus ([address]) 08/2021    Taking jardiance 10mg   • Fibroadenoma 1993   • Forceps delivery     1994 daughter   • Headache    • History of cardiac monitoring 05/13/2006    Flutter in variety of situations. She did not feel any of the PVCs or PACs   • History of echocardiogram 03/11/2016    EF 55%, mild TR   • History of nuclear stress test 04/18/2006    Adenosine MPI:  Normal EKG response. No ischemia. Probable soft tissue attenuation. No definite evidence of scar.    • HPV (human papilloma virus) infection    • Hyperlipemia    • Hypertension    • Mild dysplasia of cervix    • Palpitations    • Shingles 02/2022   • SVT (supraventricular tachycardia) (HCC)    • Urinary tract infection    • Uses oral contraceptives       Past Surgical History:     Past Surgical History:   Procedure Laterality Date   • AV NODE ABLATION  05/10/2016    s/p successful AV iván ablation   • BREAST EXCISIONAL BIOPSY Right    • BREAST LUMPECTOMY Left    • CERVICAL CONE BIOPSY     • COLONOSCOPY        Social History:     Social History     Socioeconomic History   • Marital status: /Civil Union     Spouse name: None   • Number of children: None   • Years of education: None   • Highest education level: None   Occupational History   • None   Tobacco Use   • Smoking status: Never   • Smokeless tobacco: Never   Vaping Use   • Vaping Use: Never used   Substance and Sexual Activity   • Alcohol use: Yes     Comment: Only drink socially which is not often   • Drug use: No   • Sexual activity: Yes     Partners: Male     Birth control/protection: Post-menopausal, Male Sterilization   Other Topics Concern   • None   Social History Narrative   • None     Social Determinants of Health     Financial Resource Strain: Not on file   Food Insecurity: Not on file   Transportation Needs: Not on file   Physical Activity: Not on file   Stress: Not on file   Social Connections: Not on file   Intimate Partner Violence: Not on file   Housing Stability: Not on file      Family History:     Family History   Problem Relation Age of Onset   • Bone cancer Mother    • Hypertension Mother            • Multiple myeloma Mother    • Dementia Father    • Diabetes Father            • Seizures Father    • Heart disease Father         MI in his 62s   • Hyperlipidemia Father    • Transient ischemic attack Father    • Uterine cancer Maternal Aunt    • Breast cancer Maternal Aunt    • Colon cancer Maternal Aunt    • Breast cancer Paternal Aunt    • Cervical cancer Neg Hx       Current Medications:     Current Outpatient Medications   Medication Sig Dispense Refill   • Biotin 2500 MCG CAPS Take by mouth     • Calcium Carbonate-Vitamin D 600-400 MG-UNIT per tablet Take 1 tablet by mouth     • Cholecalciferol 1000 units tablet Take 6,000 Units by mouth     • Cranberry 200 MG CAPS Take 200 mg by mouth daily     • ezetimibe (Zetia) 10 mg tablet Take 1 tablet (10 mg total) by mouth daily 90 tablet 3   • FREESTYLE LITE test strip daily Test     • Jardiance 10 MG TABS tablet Take 1 tablet (10 mg total) by mouth every morning 90 tablet 3   • Lancets (freestyle) lancets USE ONCE DAILY AS NEEDED     • lisinopril (ZESTRIL) 10 mg tablet TAKE 1 TABLET BY MOUTH EVERY DAY 90 tablet 3   • metoprolol succinate (TOPROL-XL) 50 mg 24 hr tablet TAKE 1 TABLET BY MOUTH EVERY DAY 90 tablet 3   • oxybutynin (DITROPAN) 5 mg tablet Take 1 tablet (5 mg total) by mouth 3 (three) times a day as needed (bladder pain) 90 tablet 5   • rosuvastatin (CRESTOR) 20 MG tablet 40 mg daily       No current facility-administered medications for this visit. Allergies:     No Known Allergies   Physical Exam:     BP 98/58 (BP Location: Left arm, Patient Position: Sitting)   Temp 97.9 °F (36.6 °C) (Tympanic)   Ht 5' 5" (1.651 m)   Wt 68.8 kg (151 lb 9.6 oz)   BMI 25.23 kg/m²     Physical Exam  Vitals and nursing note reviewed. Constitutional:       General: She is not in acute distress. Appearance: Normal appearance. She is well-developed. HENT:      Head: Normocephalic and atraumatic. Eyes:      Conjunctiva/sclera: Conjunctivae normal.   Neck:      Vascular: No carotid bruit. Cardiovascular:      Rate and Rhythm: Normal rate and regular rhythm. Pulses: Normal pulses. Heart sounds: Normal heart sounds. No murmur heard. Pulmonary:      Effort: Pulmonary effort is normal. No respiratory distress. Breath sounds: Normal breath sounds. Abdominal:      Palpations: Abdomen is soft. There is no mass. Tenderness: There is no abdominal tenderness. Musculoskeletal:         General: No swelling. Cervical back: Neck supple.    Lymphadenopathy: Cervical: No cervical adenopathy. Skin:     General: Skin is warm and dry. Capillary Refill: Capillary refill takes less than 2 seconds. Neurological:      Mental Status: She is alert and oriented to person, place, and time.    Psychiatric:         Mood and Affect: Mood normal.          Milagro Coleman MD  [address] [place] [address]

## 2023-08-21 NOTE — ASSESSMENT & PLAN NOTE
Cholesterol great although triglycerides elevated I am sure due to her worsened sugar control. As above emphasized need for diet and exercise program to continue same meds for now. Patient with one or more new problems requiring additional work-up/treatment.

## 2023-08-21 NOTE — ASSESSMENT & PLAN NOTE
Patient was sent for a glucometer etc. through her insurance company. States her mother informs her if high or low sugars or not if she is having any symptomatology whatsoever. Due to worsened hemoglobin A1c I advised we continue same medication for now but get back on diet and exercise program and we will repeat labs in 3 months. No change at that time we will need to adjust the dosage of her medication.   Lab Results   Component Value Date    HGBA1C 6.6 (H) 08/15/2023

## 2023-08-21 NOTE — ASSESSMENT & PLAN NOTE
She was also sent a free blood pressure cuff by her insurance company. She states home readings average 110 120/70-80.

## 2023-08-29 ENCOUNTER — HOSPITAL ENCOUNTER (OUTPATIENT)
Dept: ULTRASOUND IMAGING | Facility: HOSPITAL | Age: 63
Discharge: HOME | End: 2023-08-29
Payer: COMMERCIAL

## 2023-08-29 DIAGNOSIS — N84.0 ENDOMETRIAL POLYP: ICD-10-CM

## 2023-08-29 PROCEDURE — 76830 TRANSVAGINAL US NON-OB: CPT

## 2023-08-29 PROCEDURE — 76856 US EXAM PELVIC COMPLETE: CPT

## 2023-08-30 ENCOUNTER — TELEPHONE (OUTPATIENT)
Dept: FAMILY MEDICINE CLINIC | Facility: CLINIC | Age: 63
End: 2023-08-30

## 2023-08-31 DIAGNOSIS — N63.21 BREAST LUMP ON LEFT SIDE AT 1 O'CLOCK POSITION: Primary | ICD-10-CM

## 2023-08-31 NOTE — TELEPHONE ENCOUNTER
Sosa Paul from [facility] called and needs the script faxed to them.   Faxed to [phone]

## 2023-09-01 ENCOUNTER — PATIENT MESSAGE (OUTPATIENT)
Dept: FAMILY MEDICINE CLINIC | Facility: CLINIC | Age: 63
End: 2023-09-01

## 2023-09-06 ENCOUNTER — TELEPHONE (OUTPATIENT)
Dept: FAMILY MEDICINE CLINIC | Facility: CLINIC | Age: 63
End: 2023-09-06

## 2023-09-06 NOTE — TELEPHONE ENCOUNTER
Carmen from [address] xray called to say there are significant findings on Trina's abdominal ultrasound

## 2023-09-29 PROBLEM — R93.89 THICKENED ENDOMETRIUM: Status: ACTIVE | Noted: 2023-09-29

## 2023-09-29 PROBLEM — D25.1 INTRAMURAL, SUBMUCOUS, AND SUBSEROUS LEIOMYOMA OF UTERUS: Status: ACTIVE | Noted: 2023-09-29

## 2023-09-29 PROBLEM — D25.2 INTRAMURAL, SUBMUCOUS, AND SUBSEROUS LEIOMYOMA OF UTERUS: Status: ACTIVE | Noted: 2023-09-29

## 2023-09-29 PROBLEM — D25.0 INTRAMURAL, SUBMUCOUS, AND SUBSEROUS LEIOMYOMA OF UTERUS: Status: ACTIVE | Noted: 2023-09-29

## 2023-09-29 NOTE — PROGRESS NOTES
Assessment/Plan:  Incidental gynecological findings on evaluation of abdominal pain  Incidence of uterine fibroids are 60 to 70%  The endometrial stripe of 5 is normal in the absence of bleeding in a postmenopausal woman. I reassured the patient and recommended that she follow-up for her annual visit in March. She will report any vaginal bleeding which at that point would necessitate endometrial sampling       CT performed 23 for Epigastric pain with nausea and abdominal tenderness  - No definite acute abnormality in the abdomen or pelvis. - Subcentimeter focus of apparent enhancement in the endometrium. Polyp/neoplasm not excluded. Further evaluation with nonemergent ultrasound is recommended      US 23 as a follow-up   - 3 small intrauterine fibroids all </= 1.6 cm.  1 subserosal, 1 intramural, and 1 submucosal   - Endometrial stripe of 5  Thickened endometrial complex. In a postmenopausal patient, diagnostic considerations include endometrial hyperplasia, endometrial polyp, endometrial carcinoma    Today's visit took 50 minutes including review of the patient's records including the radiology reports for the ultrasound and CAT scan. The radiology reports were reviewed with the patient. Diagnoses and all orders for this visit:    Intramural, submucous, and subserous leiomyoma of uterus    Thickened endometrium    Postmenopausal status    BMI 25.0-25.9,adult              Subjective:        Patient ID: Candy Bob is a 65 y.o. female. Mrs. Juan Arambula is a 73-year-old  1 para 3 female seen in consultation at the behest of her PCP for normal findings on CAT scan and pelvic ultrasound. She experienced severe lower abdominal pain on  that kept her up all night and was associated with nausea and diaphoresis. She was seen in the emergency room the following day. A CAT scan was performed which suggested mesenteric panniculitis as a cause of her pain.   Incidentally there was an abnormality of the endometrium and a nonemergent ultrasound was recommended. The pain and diaphoresis resolved spontaneously after 2 days but the nausea lasted a total of 4 to 5 days. She did not experience any vomiting, fever, chills or change in bowel movement. An ultrasound was then performed on August 29. Uterus was small and had 3 small fibroids. The endometrial stripe was 5. She was alarmed when she read her chart and saw, "Thickened endometrial complex. In a postmenopausal patient, diagnostic considerations include endometrial hyperplasia, endometrial polyp, endometrial carcinoma." She was then referred to GYN. She has had no episodes of bleeding. There was also a recommendation to have a repeat CAT scan in December to reevaluate the small bowel and lymph nodes. She was seen in March for her GYN exam.  She had no complaints and was without symptoms. The examination was considered normal.  Menopause occurred at age 47 in 2013. She has not experienced any bleeding since then. The following portions of the patient's history were reviewed and updated as appropriate: She  has a past medical history of Abnormal Pap smear of cervix, Colon polyps, Diabetes mellitus ([address]) (08/2021), Fibroadenoma (1993), Forceps delivery, Headache, History of cardiac monitoring (05/13/2006), History of echocardiogram (03/11/2016), History of nuclear stress test (04/18/2006), HPV (human papilloma virus) infection, Hyperlipemia, Hypertension, Mild dysplasia of cervix, Palpitations, Shingles (02/2022), SVT (supraventricular tachycardia), Urinary tract infection, and Uses oral contraceptives.   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 Her family history includes Bone cancer in her mother; Breast cancer in her maternal aunt and paternal aunt; Colon cancer in her maternal aunt; Dementia in her father; Diabetes in her father; Heart disease in her father; Hyperlipidemia in her father; Hypertension in her mother; Multiple myeloma in her mother; Seizures in her father; Transient ischemic attack in her father; Uterine cancer in her maternal aunt. Family hx of breast cancer: maternal aunt, paternal aunt  Family hx of ovarian cancer: maternal aunt  Family hx of colon cancer: maternal aunt x 2  FH of Uterine Ca - MA 61, MA 79 [who also had breast cancer]  She  reports that she has never smoked. She has never used smokeless tobacco. She reports current alcohol use. She reports that she does not use drugs. SH:   to [facility]. She is retired and worked for a state representative for 25 years.   Current Outpatient Medications   Medication Sig Dispense Refill   • Biotin 2500 MCG CAPS Take by mouth     • Calcium Carbonate-Vitamin D 600-400 MG-UNIT per tablet Take 1 tablet by mouth     • Cholecalciferol 1000 units tablet Take 6,000 Units by mouth     • Cranberry 200 MG CAPS Take 200 mg by mouth daily     • ezetimibe (Zetia) 10 mg tablet Take 1 tablet (10 mg total) by mouth daily 90 tablet 3   • FREESTYLE LITE test strip daily Test     • Jardiance 10 MG TABS tablet Take 1 tablet (10 mg total) by mouth every morning 90 tablet 3   • Lancets (freestyle) lancets USE ONCE DAILY AS NEEDED     • lisinopril (ZESTRIL) 10 mg tablet TAKE 1 TABLET BY MOUTH EVERY DAY 90 tablet 3   • metoprolol succinate (TOPROL-XL) 50 mg 24 hr tablet TAKE 1 TABLET BY MOUTH EVERY DAY 90 tablet 3   • oxybutynin (DITROPAN) 5 mg tablet Take 1 tablet (5 mg total) by mouth 3 (three) times a day as needed (bladder pain) 90 tablet 5   • rosuvastatin (CRESTOR) 20 MG tablet 40 mg daily       No current facility-administered medications for this visit. Current Outpatient Medications on File Prior to Visit   Medication Sig   • Biotin 2500 MCG CAPS Take by mouth   • Calcium Carbonate-Vitamin D 600-400 MG-UNIT per tablet Take 1 tablet by mouth   • Cholecalciferol 1000 units tablet Take 6,000 Units by mouth   • Cranberry 200 MG CAPS Take 200 mg by mouth daily   • ezetimibe (Zetia) 10 mg tablet Take 1 tablet (10 mg total) by mouth daily   • FREESTYLE LITE test strip daily Test   • Jardiance 10 MG TABS tablet Take 1 tablet (10 mg total) by mouth every morning   • Lancets (freestyle) lancets USE ONCE DAILY AS NEEDED   • lisinopril (ZESTRIL) 10 mg tablet TAKE 1 TABLET BY MOUTH EVERY DAY   • metoprolol succinate (TOPROL-XL) 50 mg 24 hr tablet TAKE 1 TABLET BY MOUTH EVERY DAY   • oxybutynin (DITROPAN) 5 mg tablet Take 1 tablet (5 mg total) by mouth 3 (three) times a day as needed (bladder pain)   • rosuvastatin (CRESTOR) 20 MG tablet 40 mg daily     No current facility-administered medications on file prior to visit. She has No Known Allergies. .    Review of Systems   Constitutional: Negative for activity change, appetite change, fatigue and unexpected weight change. Eyes: Negative for visual disturbance. Respiratory: Negative for cough, chest tightness, shortness of breath and wheezing. Cardiovascular: Negative for chest pain, palpitations and leg swelling.         Breast: Patient denies tenderness, nipple discharge, masses, or erythema. Gastrointestinal: Negative for abdominal distention, abdominal pain, blood in stool, constipation, diarrhea, nausea and vomiting. Endocrine: Negative for cold intolerance and heat intolerance. Genitourinary: Positive for frequency and urgency. Negative for decreased urine volume, difficulty urinating, dyspareunia, dysuria, hematuria, menstrual problem, pelvic pain, vaginal bleeding, vaginal discharge and vaginal pain. Sexually active once a week without any problems. No stress incontinence. Does have urinary frequency and urgency. Musculoskeletal: Positive for arthralgias (Hips). Skin: Negative for rash. Neurological: Negative for weakness, light-headedness, numbness and headaches. Hematological: Does not bruise/bleed easily. Psychiatric/Behavioral: Negative for agitation, behavioral problems and sleep disturbance. The patient is not nervous/anxious. Objective:    Vitals:    10/02/23 1131   BP: 116/86   BP Location: Left arm   Patient Position: Sitting   Cuff Size: Standard   Weight: 69.7 kg (153 lb 9.6 oz)            Physical Exam  Vitals and nursing note reviewed. Exam conducted with a chaperone present. Constitutional:       General: She is not in acute distress. Appearance: Normal appearance. She is normal weight. She is not ill-appearing. HENT:      Head: Normocephalic and atraumatic. Eyes:      General: No scleral icterus. Right eye: No discharge. Left eye: No discharge. Extraocular Movements: Extraocular movements intact. Pulmonary:      Effort: Pulmonary effort is normal. No respiratory distress. Abdominal:      General: Abdomen is flat. There is no distension. Palpations: Abdomen is soft. There is no mass. Tenderness: There is abdominal tenderness (Mild left lower quadrant tenderness). There is no right CVA tenderness, left CVA tenderness, guarding or rebound. Genitourinary:     General: Normal vulva. Comments: Physiologic vaginal atrophy present and mild relaxation. Normal cervix. No cervical motion tenderness. The uterus is midplane and normal size. The fibroids are not palpable. Musculoskeletal:      Right lower leg: No edema. Left lower leg: No edema. Skin:     General: Skin is warm and dry. Neurological:      Mental Status: She is alert and oriented to person, place, and time. Psychiatric:         Mood and Affect: Mood normal.         Behavior: Behavior normal.         Thought Content:  Thought content normal.         Judgment: Judgment normal.

## 2023-10-02 ENCOUNTER — OFFICE VISIT (OUTPATIENT)
Dept: OBGYN CLINIC | Facility: CLINIC | Age: 63
End: 2023-10-02
Payer: COMMERCIAL

## 2023-10-02 VITALS — DIASTOLIC BLOOD PRESSURE: 86 MMHG | WEIGHT: 153.6 LBS | SYSTOLIC BLOOD PRESSURE: 116 MMHG | BODY MASS INDEX: 25.56 KG/M2

## 2023-10-02 DIAGNOSIS — Z78.0 POSTMENOPAUSAL STATUS: ICD-10-CM

## 2023-10-02 DIAGNOSIS — D25.0 INTRAMURAL, SUBMUCOUS, AND SUBSEROUS LEIOMYOMA OF UTERUS: Primary | ICD-10-CM

## 2023-10-02 DIAGNOSIS — D25.1 INTRAMURAL, SUBMUCOUS, AND SUBSEROUS LEIOMYOMA OF UTERUS: Primary | ICD-10-CM

## 2023-10-02 DIAGNOSIS — D25.2 INTRAMURAL, SUBMUCOUS, AND SUBSEROUS LEIOMYOMA OF UTERUS: Primary | ICD-10-CM

## 2023-10-02 DIAGNOSIS — R93.89 THICKENED ENDOMETRIUM: ICD-10-CM

## 2023-10-02 PROCEDURE — 99214 OFFICE O/P EST MOD 30 MIN: CPT | Performed by: OBSTETRICS & GYNECOLOGY

## 2023-10-02 NOTE — LETTER
2023     [facility], [address] [place] [address] [address]  [place] [address]    Patient: Candy Bob   YOB: 1960   Date of Visit: 10/2/2023       Dear Dr. Khadar Gann: Thank you for referring Regina Augustin to me for evaluation. Below are my notes for this consultation. If you have questions, please do not hesitate to call me. I look forward to following your patient along with you. Sincerely,        Gerri Rivera MD        CC: No Recipients    Gerri Rivera MD  10/2/2023 12:39 PM  Incomplete  Assessment/Plan:  Incidental gynecological findings on evaluation of abdominal pain  Incidence of uterine fibroids are 60 to 70%  The endometrial stripe of 5 is normal in the absence of bleeding in a postmenopausal woman. I reassured the patient and recommended that she follow-up for her annual visit in March. She will report any vaginal bleeding which at that point would necessitate endometrial sampling      CT performed 23 for Epigastric pain with nausea and abdominal tenderness  - No definite acute abnormality in the abdomen or pelvis. - Subcentimeter focus of apparent enhancement in the endometrium. Polyp/neoplasm not excluded. Further evaluation with nonemergent ultrasound is recommended      US 23 as a follow-up   - 3 small intrauterine fibroids all </= 1.6 cm.  1 subserosal, 1 intramural, and 1 submucosal   - Endometrial stripe of 5  Thickened endometrial complex. In a postmenopausal patient, diagnostic considerations include endometrial hyperplasia, endometrial polyp, endometrial carcinoma    Diagnoses and all orders for this visit:    Intramural, submucous, and subserous leiomyoma of uterus    Thickened endometrium    Postmenopausal status    BMI 25.0-25.9,adult             Subjective:       Patient ID: Candy Bob is a 65 y.o. female. Mrs. Juan Arambula is a 28-year-old  1 para 3 female seen in consultation at the behest of her PCP for normal findings on CAT scan and pelvic ultrasound. She experienced severe lower abdominal pain on June 20 that kept her up all night and was associated with nausea and diaphoresis. She was seen in the emergency room the following day. A CAT scan was performed which suggested mesenteric panniculitis as a cause of her pain. Incidentally there was an abnormality of the endometrium and a nonemergent ultrasound was recommended. The pain and diaphoresis resolved spontaneously after 2 days but the nausea lasted a total of 4 to 5 days. She did not experience any vomiting, fever, chills or change in bowel movement. An ultrasound was then performed on August 29. Uterus was small and had 3 small fibroids. The endometrial stripe was 5. She was alarmed when she read her chart and saw, "Thickened endometrial complex. In a postmenopausal patient, diagnostic considerations include endometrial hyperplasia, endometrial polyp, endometrial carcinoma." She was then referred to GYN. She has had no episodes of bleeding. There was also a recommendation to have a repeat CAT scan in December to reevaluate the small bowel and lymph nodes. She was seen in March for her GYN exam.  She had no complaints and was without symptoms. The examination was considered normal.  Menopause occurred at age 47 in 2013. She has not experienced any bleeding since then.               The following portions of the patient's history were reviewed and updated as appropriate: She  has a past medical history of Abnormal Pap smear of cervix, Colon polyps, Diabetes mellitus ([address]) (08/2021), Fibroadenoma (1993), Forceps delivery, Headache, History of cardiac monitoring (05/13/2006), History of echocardiogram (03/11/2016), History of nuclear stress test (04/18/2006), HPV (human papilloma virus) infection, Hyperlipemia, Hypertension, Mild dysplasia of cervix, Palpitations, Shingles (02/2022), SVT (supraventricular tachycardia) ([address]), Urinary tract infection, and Uses oral contraceptives. 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 Her family history includes Bone cancer in her mother; Breast cancer in her maternal aunt and paternal aunt; Colon cancer in her maternal aunt; Dementia in her father; Diabetes in her father; Heart disease in her father; Hyperlipidemia in her father; Hypertension in her mother; Multiple myeloma in her mother; Seizures in her father; Transient ischemic attack in her father; Uterine cancer in her maternal aunt. Family hx of breast cancer: maternal aunt, paternal aunt  Family hx of ovarian cancer: maternal aunt  Family hx of colon cancer: maternal aunt x 2  FH of Uterine Ca - MA 61, MA 79 [who also had breast cancer]  She  reports that she has never smoked. She has never used smokeless tobacco. She reports current alcohol use. She reports that she does not use drugs. SH:   to [facility]. She is retired and worked for a state representative for 25 years.   Current Outpatient Medications   Medication Sig Dispense Refill   • Biotin 2500 MCG CAPS Take by mouth     • Calcium Carbonate-Vitamin D 600-400 MG-UNIT per tablet Take 1 tablet by mouth     • Cholecalciferol 1000 units tablet Take 6,000 Units by mouth     • Cranberry 200 MG CAPS Take 200 mg by mouth daily     • ezetimibe (Zetia) 10 mg tablet Take 1 tablet (10 mg total) by mouth daily 90 tablet 3   • FREESTYLE LITE test strip daily Test     • Jardiance 10 MG TABS tablet Take 1 tablet (10 mg total) by mouth every morning 90 tablet 3   • Lancets (freestyle) lancets USE ONCE DAILY AS NEEDED     • lisinopril (ZESTRIL) 10 mg tablet TAKE 1 TABLET BY MOUTH EVERY DAY 90 tablet 3   • metoprolol succinate (TOPROL-XL) 50 mg 24 hr tablet TAKE 1 TABLET BY MOUTH EVERY DAY 90 tablet 3   • oxybutynin (DITROPAN) 5 mg tablet Take 1 tablet (5 mg total) by mouth 3 (three) times a day as needed (bladder pain) 90 tablet 5   • rosuvastatin (CRESTOR) 20 MG tablet 40 mg daily       No current facility-administered medications for this visit. Current Outpatient Medications on File Prior to Visit   Medication Sig   • Biotin 2500 MCG CAPS Take by mouth   • Calcium Carbonate-Vitamin D 600-400 MG-UNIT per tablet Take 1 tablet by mouth   • Cholecalciferol 1000 units tablet Take 6,000 Units by mouth   • Cranberry 200 MG CAPS Take 200 mg by mouth daily   • ezetimibe (Zetia) 10 mg tablet Take 1 tablet (10 mg total) by mouth daily   • FREESTYLE LITE test strip daily Test   • Jardiance 10 MG TABS tablet Take 1 tablet (10 mg total) by mouth every morning   • Lancets (freestyle) lancets USE ONCE DAILY AS NEEDED   • lisinopril (ZESTRIL) 10 mg tablet TAKE 1 TABLET BY MOUTH EVERY DAY   • metoprolol succinate (TOPROL-XL) 50 mg 24 hr tablet TAKE 1 TABLET BY MOUTH EVERY DAY   • oxybutynin (DITROPAN) 5 mg tablet Take 1 tablet (5 mg total) by mouth 3 (three) times a day as needed (bladder pain)   • rosuvastatin (CRESTOR) 20 MG tablet 40 mg daily     No current facility-administered medications on file prior to visit.      She has No Known Allergies. .    Review of Systems   Constitutional: Negative for activity change, appetite change, fatigue and unexpected weight change. Eyes: Negative for visual disturbance. Respiratory: Negative for cough, chest tightness, shortness of breath and wheezing. Cardiovascular: Negative for chest pain, palpitations and leg swelling. Breast: Patient denies tenderness, nipple discharge, masses, or erythema. Gastrointestinal: Negative for abdominal distention, abdominal pain, blood in stool, constipation, diarrhea, nausea and vomiting. Endocrine: Negative for cold intolerance and heat intolerance. Genitourinary: Positive for frequency and urgency. Negative for decreased urine volume, difficulty urinating, dyspareunia, dysuria, hematuria, menstrual problem, pelvic pain, vaginal bleeding, vaginal discharge and vaginal pain. Sexually active once a week without any problems. No stress incontinence. Does have urinary frequency and urgency. Musculoskeletal: Positive for arthralgias (Hips). Skin: Negative for rash. Neurological: Negative for weakness, light-headedness, numbness and headaches. Hematological: Does not bruise/bleed easily. Psychiatric/Behavioral: Negative for agitation, behavioral problems and sleep disturbance. The patient is not nervous/anxious. Objective:    Vitals:    10/02/23 1131   BP: 116/86   BP Location: Left arm   Patient Position: Sitting   Cuff Size: Standard   Weight: 69.7 kg (153 lb 9.6 oz)           Physical Exam  Vitals and nursing note reviewed. Exam conducted with a chaperone present. Constitutional:       General: She is not in acute distress. Appearance: Normal appearance. She is normal weight. She is not ill-appearing. HENT:      Head: Normocephalic and atraumatic. Eyes:      General: No scleral icterus. Right eye: No discharge. Left eye: No discharge. Extraocular Movements: Extraocular movements intact.    Pulmonary: Effort: Pulmonary effort is normal. No respiratory distress. Abdominal:      General: Abdomen is flat. There is no distension. Palpations: Abdomen is soft. There is no mass. Tenderness: There is abdominal tenderness (Mild left lower quadrant tenderness). There is no right CVA tenderness, left CVA tenderness, guarding or rebound. Genitourinary:     General: Normal vulva. Comments: Physiologic vaginal atrophy present and mild relaxation. Normal cervix. No cervical motion tenderness. The uterus is midplane and normal size. The fibroids are not palpable. Musculoskeletal:      Right lower leg: No edema. Left lower leg: No edema. Skin:     General: Skin is warm and dry. Neurological:      Mental Status: She is alert and oriented to person, place, and time. Psychiatric:         Mood and Affect: Mood normal.         Behavior: Behavior normal.         Thought Content: Thought content normal.         Judgment: Judgment normal.          James Avila MD  2023 12:27 PM  Sign when Signing Visit  Assessment/Plan:    CT performed 23 for Epigastric pain with nausea and abdominal tenderness  - No definite acute abnormality in the abdomen or pelvis. - Subcentimeter focus of apparent enhancement in the endometrium. Polyp/neoplasm not excluded. Further evaluation with nonemergent ultrasound is recommended      US 23 as a follow-up   - 3 small intrauterine fibroids all </= 1.6 cm.  1 subserosal, 1 intramural, and 1 submucosal   - Endometrial stripe of 5      Diagnoses and all orders for this visit:    Intramural, submucous, and subserous leiomyoma of uterus    Thickened endometrium    BMI 25.0-25.9,adult             Subjective:       Patient ID: Fatimah Michelle is a 65 y.o. female. Mrs. Rachel Steen is a 71-year-old  1 para 3 female seen in consultation at the behest of her PCP for normal findings on CAT scan and pelvic ultrasound.     She was seen in March for her GYN exam and no symptoms. Examination was considered normal.    Menopause. The following portions of the patient's history were reviewed and updated as appropriate: She  has a past medical history of Abnormal Pap smear of cervix, Colon polyps, Diabetes mellitus ([address]) (08/2021), Fibroadenoma (1993), Forceps delivery, Headache, History of cardiac monitoring (05/13/2006), History of echocardiogram (03/11/2016), History of nuclear stress test (04/18/2006), HPV (human papilloma virus) infection, Hyperlipemia, Hypertension, Mild dysplasia of cervix, Palpitations, Shingles (02/2022), SVT (supraventricular tachycardia) ([address]), Urinary tract infection, and Uses oral contraceptives. Patient Active Problem List    Diagnosis Date Noted   • Intramural, submucous, and subserous leiomyoma of uterus 09/29/2023   • Thickened endometrium 09/29/2023   • Type 2 diabetes mellitus without complication, without long-term current use of insulin ([address]) 08/21/2023   • Family history of ischemic heart disease (IHD) 06/06/2023   • Essential hypertension 05/01/2019   • Trigeminal nerve disorder 05/01/2019   • Supraventricular tachycardia ([address]) 03/29/2016   • Mixed hyperlipidemia 11/06/2012   • Vitamin D deficiency 11/06/2012   PMH:    She  has a past surgical history that includes Breast excisional biopsy (Right, 1998); AV node ablation (05/10/2016); Cervical cone biopsy (1988); Colonoscopy (2013); and Breast lumpectomy (Left, 1981). Her family history includes Bone cancer in her mother; Breast cancer in her maternal aunt and paternal aunt; Colon cancer in her maternal aunt; Dementia in her father; Diabetes in her father; Heart disease in her father; Hyperlipidemia in her father; Hypertension in her mother; Multiple myeloma in her mother; Seizures in her father; Transient ischemic attack in her father; Uterine cancer in her maternal aunt.    Family hx of breast cancer: maternal aunt, paternal aunt  Family hx of ovarian cancer: maternal aunt  Family hx of colon cancer: maternal aunt x 2  She  reports that she has never smoked. She has never used smokeless tobacco. She reports current alcohol use. She reports that she does not use drugs. SH:   to [place]  Current Outpatient Medications   Medication Sig Dispense Refill   • Biotin 2500 MCG CAPS Take by mouth     • Calcium Carbonate-Vitamin D 600-400 MG-UNIT per tablet Take 1 tablet by mouth     • Cholecalciferol 1000 units tablet Take 6,000 Units by mouth     • Cranberry 200 MG CAPS Take 200 mg by mouth daily     • ezetimibe (Zetia) 10 mg tablet Take 1 tablet (10 mg total) by mouth daily 90 tablet 3   • FREESTYLE LITE test strip daily Test     • Jardiance 10 MG TABS tablet Take 1 tablet (10 mg total) by mouth every morning 90 tablet 3   • Lancets (freestyle) lancets USE ONCE DAILY AS NEEDED     • lisinopril (ZESTRIL) 10 mg tablet TAKE 1 TABLET BY MOUTH EVERY DAY 90 tablet 3   • metoprolol succinate (TOPROL-XL) 50 mg 24 hr tablet TAKE 1 TABLET BY MOUTH EVERY DAY 90 tablet 3   • oxybutynin (DITROPAN) 5 mg tablet Take 1 tablet (5 mg total) by mouth 3 (three) times a day as needed (bladder pain) 90 tablet 5   • rosuvastatin (CRESTOR) 20 MG tablet 40 mg daily       No current facility-administered medications for this visit.      Current Outpatient Medications on File Prior to Visit   Medication Sig   • Biotin 2500 MCG CAPS Take by mouth   • Calcium Carbonate-Vitamin D 600-400 MG-UNIT per tablet Take 1 tablet by mouth   • Cholecalciferol 1000 units tablet Take 6,000 Units by mouth   • Cranberry 200 MG CAPS Take 200 mg by mouth daily   • ezetimibe (Zetia) 10 mg tablet Take 1 tablet (10 mg total) by mouth daily   • FREESTYLE LITE test strip daily Test   • Jardiance 10 MG TABS tablet Take 1 tablet (10 mg total) by mouth every morning   • Lancets (freestyle) lancets USE ONCE DAILY AS NEEDED   • lisinopril (ZESTRIL) 10 mg tablet TAKE 1 TABLET BY MOUTH EVERY DAY   • metoprolol succinate (TOPROL-XL) 50 mg 24 hr tablet TAKE 1 TABLET BY MOUTH EVERY DAY   • oxybutynin (DITROPAN) 5 mg tablet Take 1 tablet (5 mg total) by mouth 3 (three) times a day as needed (bladder pain)   • rosuvastatin (CRESTOR) 20 MG tablet 40 mg daily     No current facility-administered medications on file prior to visit. She has No Known Allergies. .    Review of Systems     Objective: There were no vitals filed for this visit.         Physical Exam

## 2023-11-19 ENCOUNTER — APPOINTMENT (EMERGENCY)
Dept: CT IMAGING | Facility: HOSPITAL | Age: 63
End: 2023-11-19
Payer: COMMERCIAL

## 2023-11-19 ENCOUNTER — HOSPITAL ENCOUNTER (EMERGENCY)
Facility: HOSPITAL | Age: 63
Discharge: HOME | End: 2023-11-19
Attending: EMERGENCY MEDICINE
Payer: COMMERCIAL

## 2023-11-19 VITALS
RESPIRATION RATE: 16 BRPM | TEMPERATURE: 98.8 F | OXYGEN SATURATION: 94 % | WEIGHT: 153.66 LBS | HEART RATE: 84 BPM | BODY MASS INDEX: 25.57 KG/M2 | SYSTOLIC BLOOD PRESSURE: 140 MMHG | DIASTOLIC BLOOD PRESSURE: 74 MMHG

## 2023-11-19 DIAGNOSIS — R11.0 NAUSEA: ICD-10-CM

## 2023-11-19 DIAGNOSIS — K57.92 DIVERTICULITIS: Primary | ICD-10-CM

## 2023-11-19 LAB
ALBUMIN SERPL BCP-MCNC: 4.9 G/DL (ref 3.5–5)
ALP SERPL-CCNC: 65 U/L (ref 34–104)
ALT SERPL W P-5'-P-CCNC: 18 U/L (ref 7–52)
ANION GAP SERPL CALCULATED.3IONS-SCNC: 10 MMOL/L
AST SERPL W P-5'-P-CCNC: 12 U/L (ref 13–39)
BACTERIA UR QL AUTO: NORMAL /HPF
BASOPHILS # BLD AUTO: 0.05 THOUSANDS/ÂΜL (ref 0–0.1)
BASOPHILS NFR BLD AUTO: 0 % (ref 0–1)
BILIRUB SERPL-MCNC: 0.67 MG/DL (ref 0.2–1)
BILIRUB UR QL STRIP: NEGATIVE
BUN SERPL-MCNC: 13 MG/DL (ref 5–25)
CALCIUM SERPL-MCNC: 10.2 MG/DL (ref 8.4–10.2)
CHLORIDE SERPL-SCNC: 101 MMOL/L (ref 96–108)
CLARITY UR: CLEAR
CO2 SERPL-SCNC: 28 MMOL/L (ref 21–32)
COLOR UR: YELLOW
CREAT SERPL-MCNC: 0.62 MG/DL (ref 0.6–1.3)
EOSINOPHIL # BLD AUTO: 0.16 THOUSAND/ÂΜL (ref 0–0.61)
EOSINOPHIL NFR BLD AUTO: 1 % (ref 0–6)
ERYTHROCYTE [DISTWIDTH] IN BLOOD BY AUTOMATED COUNT: 12.5 % (ref 11.6–15.1)
GFR SERPL CREATININE-BSD FRML MDRD: 96 ML/MIN/1.73SQ M
GLUCOSE SERPL-MCNC: 135 MG/DL (ref 65–140)
GLUCOSE UR STRIP-MCNC: ABNORMAL MG/DL
HCT VFR BLD AUTO: 45.2 % (ref 34.8–46.1)
HGB BLD-MCNC: 14.6 G/DL (ref 11.5–15.4)
HGB UR QL STRIP.AUTO: NEGATIVE
IMM GRANULOCYTES # BLD AUTO: 0.03 THOUSAND/UL (ref 0–0.2)
IMM GRANULOCYTES NFR BLD AUTO: 0 % (ref 0–2)
KETONES UR STRIP-MCNC: NEGATIVE MG/DL
LEUKOCYTE ESTERASE UR QL STRIP: ABNORMAL
LIPASE SERPL-CCNC: 30 U/L (ref 11–82)
LYMPHOCYTES # BLD AUTO: 3.07 THOUSANDS/ÂΜL (ref 0.6–4.47)
LYMPHOCYTES NFR BLD AUTO: 25 % (ref 14–44)
MCH RBC QN AUTO: 30.5 PG (ref 26.8–34.3)
MCHC RBC AUTO-ENTMCNC: 32.3 G/DL (ref 31.4–37.4)
MCV RBC AUTO: 95 FL (ref 82–98)
MONOCYTES # BLD AUTO: 0.81 THOUSAND/ÂΜL (ref 0.17–1.22)
MONOCYTES NFR BLD AUTO: 7 % (ref 4–12)
NEUTROPHILS # BLD AUTO: 8.11 THOUSANDS/ÂΜL (ref 1.85–7.62)
NEUTS SEG NFR BLD AUTO: 67 % (ref 43–75)
NITRITE UR QL STRIP: NEGATIVE
NON-SQ EPI CELLS URNS QL MICRO: NORMAL /HPF
NRBC BLD AUTO-RTO: 0 /100 WBCS
PH UR STRIP.AUTO: 6 [PH]
PLATELET # BLD AUTO: 290 THOUSANDS/UL (ref 149–390)
PMV BLD AUTO: 10.2 FL (ref 8.9–12.7)
POTASSIUM SERPL-SCNC: 3.6 MMOL/L (ref 3.5–5.3)
PROT SERPL-MCNC: 7.8 G/DL (ref 6.4–8.4)
PROT UR STRIP-MCNC: NEGATIVE MG/DL
RBC # BLD AUTO: 4.78 MILLION/UL (ref 3.81–5.12)
RBC #/AREA URNS AUTO: NORMAL /HPF
SODIUM SERPL-SCNC: 139 MMOL/L (ref 135–147)
SP GR UR STRIP.AUTO: 1.01 (ref 1–1.03)
UROBILINOGEN UR QL STRIP.AUTO: 0.2 E.U./DL
WBC # BLD AUTO: 12.23 THOUSAND/UL (ref 4.31–10.16)
WBC #/AREA URNS AUTO: NORMAL /HPF

## 2023-11-19 PROCEDURE — 81001 URINALYSIS AUTO W/SCOPE: CPT | Performed by: PHYSICIAN ASSISTANT

## 2023-11-19 PROCEDURE — 83690 ASSAY OF LIPASE: CPT | Performed by: PHYSICIAN ASSISTANT

## 2023-11-19 PROCEDURE — 96375 TX/PRO/DX INJ NEW DRUG ADDON: CPT

## 2023-11-19 PROCEDURE — 99284 EMERGENCY DEPT VISIT MOD MDM: CPT

## 2023-11-19 PROCEDURE — G1004 CDSM NDSC: HCPCS

## 2023-11-19 PROCEDURE — 99285 EMERGENCY DEPT VISIT HI MDM: CPT | Performed by: PHYSICIAN ASSISTANT

## 2023-11-19 PROCEDURE — 80053 COMPREHEN METABOLIC PANEL: CPT | Performed by: PHYSICIAN ASSISTANT

## 2023-11-19 PROCEDURE — 36415 COLL VENOUS BLD VENIPUNCTURE: CPT | Performed by: PHYSICIAN ASSISTANT

## 2023-11-19 PROCEDURE — 96365 THER/PROPH/DIAG IV INF INIT: CPT

## 2023-11-19 PROCEDURE — 85025 COMPLETE CBC W/AUTO DIFF WBC: CPT | Performed by: PHYSICIAN ASSISTANT

## 2023-11-19 PROCEDURE — 74177 CT ABD & PELVIS W/CONTRAST: CPT

## 2023-11-19 PROCEDURE — 96366 THER/PROPH/DIAG IV INF ADDON: CPT

## 2023-11-19 RX ORDER — METRONIDAZOLE 500 MG/1
500 TABLET ORAL EVERY 6 HOURS
Qty: 28 TABLET | Refills: 0 | Status: SHIPPED | OUTPATIENT
Start: 2023-11-19 | End: 2023-11-26

## 2023-11-19 RX ORDER — SODIUM CHLORIDE, SODIUM GLUCONATE, SODIUM ACETATE, POTASSIUM CHLORIDE, MAGNESIUM CHLORIDE, SODIUM PHOSPHATE, DIBASIC, AND POTASSIUM PHOSPHATE .53; .5; .37; .037; .03; .012; .00082 G/100ML; G/100ML; G/100ML; G/100ML; G/100ML; G/100ML; G/100ML
1000 INJECTION, SOLUTION INTRAVENOUS ONCE
Status: COMPLETED | OUTPATIENT
Start: 2023-11-19 | End: 2023-11-19

## 2023-11-19 RX ORDER — CIPROFLOXACIN 500 MG/1
500 TABLET, FILM COATED ORAL EVERY 12 HOURS
Qty: 14 TABLET | Refills: 0 | Status: SHIPPED | OUTPATIENT
Start: 2023-11-19 | End: 2023-11-26

## 2023-11-19 RX ORDER — ONDANSETRON 2 MG/ML
4 INJECTION INTRAMUSCULAR; INTRAVENOUS ONCE
Status: COMPLETED | OUTPATIENT
Start: 2023-11-19 | End: 2023-11-19

## 2023-11-19 RX ORDER — ONDANSETRON 4 MG/1
4 TABLET, ORALLY DISINTEGRATING ORAL EVERY 6 HOURS PRN
Qty: 12 TABLET | Refills: 0 | Status: SHIPPED | OUTPATIENT
Start: 2023-11-19

## 2023-11-19 RX ORDER — KETOROLAC TROMETHAMINE 30 MG/ML
15 INJECTION, SOLUTION INTRAMUSCULAR; INTRAVENOUS ONCE
Status: COMPLETED | OUTPATIENT
Start: 2023-11-19 | End: 2023-11-19

## 2023-11-19 RX ORDER — METRONIDAZOLE 500 MG/1
500 TABLET ORAL ONCE
Status: COMPLETED | OUTPATIENT
Start: 2023-11-19 | End: 2023-11-19

## 2023-11-19 RX ORDER — CIPROFLOXACIN 500 MG/1
500 TABLET, FILM COATED ORAL ONCE
Status: COMPLETED | OUTPATIENT
Start: 2023-11-19 | End: 2023-11-19

## 2023-11-19 RX ADMIN — IOHEXOL 100 ML: 350 INJECTION, SOLUTION INTRAVENOUS at 15:29

## 2023-11-19 RX ADMIN — SODIUM CHLORIDE, SODIUM GLUCONATE, SODIUM ACETATE, POTASSIUM CHLORIDE, MAGNESIUM CHLORIDE, SODIUM PHOSPHATE, DIBASIC, AND POTASSIUM PHOSPHATE 1000 ML: .53; .5; .37; .037; .03; .012; .00082 INJECTION, SOLUTION INTRAVENOUS at 14:54

## 2023-11-19 RX ADMIN — METRONIDAZOLE 500 MG: 500 TABLET ORAL at 17:14

## 2023-11-19 RX ADMIN — CIPROFLOXACIN 500 MG: 500 TABLET ORAL at 17:14

## 2023-11-19 RX ADMIN — ONDANSETRON 4 MG: 2 INJECTION INTRAMUSCULAR; INTRAVENOUS at 14:54

## 2023-11-19 RX ADMIN — KETOROLAC TROMETHAMINE 15 MG: 30 INJECTION, SOLUTION INTRAMUSCULAR at 14:54

## 2023-11-19 NOTE — DISCHARGE INSTRUCTIONS
Clear liquids and advance diet as tolerated. Take antibiotics as directed for the full duration. Zofran as needed for nausea/vomiting. Continue to alternate OTC tylenol and ibuprofen as needed for fever/discomfort. Follow up with PCP or return to ER as needed.

## 2023-11-19 NOTE — ED PROVIDER NOTES
History  Chief Complaint   Patient presents with    Abdominal Pain     Patient reports lower abdominal pain that started yesterday and appeared to be like an intermittent "cramp", reports today the pain is more consistent and remains "cramping". Slight nausea, denies vomiting/diarrhea. 59year old female with PMH DM, HTN, HLD, fibromyalgia, h/o SVT presenting ambulatory from home for evaluation of lower abdominal pain. Pt reports symptoms started x 2 days ago. Initially felt crampy and intermittent in nature but now has been more severe and constant in nature. Pain described as being across lower abdomen on both sides. She notes she had some lower back pain the other day but this improved. Denies fever but reports some chills. Denies chest pain, SOB. She notes some intermittent nausea. Denies V/D/C. She notes she had a bowel movement earlier but didn't feel better after this. Took 2 advil last night which helped her sleep but states the pain awoke her this morning. Denies dysuria, frequency, urgency, hematuria. She reports she had dysuria and vaginal itching last week but took OTC monistat and her symptoms improved. Denies vaginal discharge or bleeding. She is post menopausal.  Denies any prior abdominal surgeries. She notes prior cervical biopsy many years ago. 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 She indicates that her pain at that time was in her upper abdomen and this is different in nature. No reported aggravating or alleviating factors. No prior evaluation since onset.       History provided by:  Medical records and patient   used: No    Abdominal Pain  Pain location:  Suprapubic, LLQ and RLQ  Pain radiates to:  Does not radiate  Duration:  2 days  Timing:  Constant  Progression:  Worsening  Chronicity:  New  Context: awakening from sleep    Context: not previous surgeries, not recent illness, not recent travel, not retching, not sick contacts, not suspicious food intake and not trauma    Relieved by:  Nothing  Worsened by:  Nothing  Ineffective treatments:  OTC medications  Associated symptoms: chills, dysuria (last week but improved after taking monistat for yeast infection) and nausea    Associated symptoms: no chest pain, no constipation, no cough, no diarrhea, no fatigue, no fever, no hematuria, no shortness of breath, no sore throat, no vaginal bleeding, no vaginal discharge and no vomiting    Risk factors: has not had multiple surgeries, not pregnant and no recent hospitalization        Prior to Admission Medications   Prescriptions Last Dose Informant Patient Reported? Taking?    Biotin 2500 MCG CAPS  Self Yes No   Sig: Take by mouth   Calcium Carbonate-Vitamin D 600-400 MG-UNIT per tablet  Self Yes No   Sig: Take 1 tablet by mouth   Cholecalciferol 1000 units tablet  Self Yes No   Sig: Take 6,000 Units by mouth   Cranberry 200 MG CAPS  Self Yes No   Sig: Take 200 mg by mouth daily   FREESTYLE LITE test strip  Self Yes No   Sig: daily Test   Jardiance 10 MG TABS tablet   No No   Sig: Take 1 tablet (10 mg total) by mouth every morning   Lancets (freestyle) lancets  Self Yes No   Sig: USE ONCE DAILY AS NEEDED   ezetimibe (Zetia) 10 mg tablet   No No   Sig: Take 1 tablet (10 mg total) by mouth daily   lisinopril (ZESTRIL) 10 mg tablet  Self No No   Sig: TAKE 1 TABLET BY MOUTH EVERY DAY   metoprolol succinate (TOPROL-XL) 50 mg 24 hr tablet   No No   Sig: TAKE 1 TABLET BY MOUTH EVERY DAY   oxybutynin (DITROPAN) 5 mg tablet  Self No No   Sig: Take 1 tablet (5 mg total) by mouth 3 (three) times a day as needed (bladder pain)   rosuvastatin (CRESTOR) 20 MG tablet  Self Yes No   Si mg daily      Facility-Administered Medications: None       Past Medical History:   Diagnosis Date    Abnormal Pap smear of cervix     Colon polyps     Diabetes mellitus ([address]) 2021    Taking jardiance 10mg Fibroadenoma     Forceps delivery      daughter    Headache     History of cardiac monitoring 2006    Flutter in variety of situations. She did not feel any of the PVCs or PACs    History of echocardiogram 2016    EF 55%, mild TR    History of nuclear stress test 2006    Adenosine MPI:  Normal EKG response. No ischemia. Probable soft tissue attenuation. No definite evidence of scar. HPV (human papilloma virus) infection     Hyperlipemia     Hypertension     Mild dysplasia of cervix     Palpitations     Shingles 2022    SVT (supraventricular tachycardia)     Urinary tract infection     Uses oral contraceptives        Past Surgical History:   Procedure Laterality Date    AV NODE ABLATION  05/10/2016    s/p successful AV iván ablation    BREAST EXCISIONAL BIOPSY Right 1998    BREAST LUMPECTOMY Left     CERVICAL CONE BIOPSY      COLONOSCOPY         Family History   Problem Relation Age of Onset    Bone cancer Mother     Hypertension Mother             Multiple myeloma Mother     Dementia Father     Diabetes Father             Seizures Father     Heart disease Father         MI in his 62s    Hyperlipidemia Father     Transient ischemic attack Father     Uterine cancer Maternal Aunt     Breast cancer Maternal Aunt     Colon cancer Maternal Aunt     Breast cancer Paternal Aunt     Cervical cancer Neg Hx      I have reviewed and agree with the history as documented. E-Cigarette/Vaping    E-Cigarette Use Never User      E-Cigarette/Vaping Substances    Nicotine No     THC No     CBD No     Flavoring No     Other No     Unknown No      Social History     Tobacco Use    Smoking status: Never    Smokeless tobacco: Never   Vaping Use    Vaping Use: Never used   Substance Use Topics    Alcohol use: Yes     Comment: Only drink socially which is not often    Drug use: No       Review of Systems   Constitutional:  Positive for chills.  Negative for fatigue and fever.   HENT: Negative. Negative for congestion, rhinorrhea and sore throat. Eyes: Negative. Negative for visual disturbance. Respiratory: Negative. Negative for cough, shortness of breath and wheezing. Cardiovascular: Negative. Negative for chest pain, palpitations and leg swelling. Gastrointestinal:  Positive for abdominal pain and nausea. Negative for constipation, diarrhea and vomiting. Genitourinary:  Positive for dysuria (last week but improved after taking monistat for yeast infection). Negative for flank pain, frequency, hematuria, vaginal bleeding and vaginal discharge. Musculoskeletal:  Positive for back pain (had some the other day, none at present). Negative for myalgias and neck pain. Skin: Negative. Negative for rash. Neurological: Negative. Negative for dizziness, light-headedness and headaches. Psychiatric/Behavioral: Negative. All other systems reviewed and are negative. Physical Exam  Physical Exam  Vitals and nursing note reviewed. Constitutional:       General: She is awake. She is not in acute distress. Appearance: She is well-developed. She is not toxic-appearing. HENT:      Head: Normocephalic and atraumatic. Right Ear: Hearing and external ear normal.      Left Ear: Hearing and external ear normal.      Nose: Nose normal.      Mouth/Throat:      Mouth: Mucous membranes are moist.      Pharynx: Oropharynx is clear. Uvula midline. Eyes:      General: Lids are normal. No scleral icterus. Conjunctiva/sclera: Conjunctivae normal.      Pupils: Pupils are equal, round, and reactive to light. Neck:      Trachea: Trachea and phonation normal. No tracheal deviation. Cardiovascular:      Rate and Rhythm: Normal rate and regular rhythm. Pulses: Normal pulses. Heart sounds: Normal heart sounds, S1 normal and S2 normal. No murmur heard. Pulmonary:      Effort: Pulmonary effort is normal. No tachypnea or respiratory distress.       Breath sounds: Normal breath sounds. No wheezing, rhonchi or rales. Abdominal:      General: Bowel sounds are normal. There is no distension. Palpations: Abdomen is soft. Tenderness: There is abdominal tenderness in the suprapubic area. There is no right CVA tenderness, left CVA tenderness, guarding or rebound. Musculoskeletal:         General: No tenderness. Normal range of motion. Right lower leg: No edema. Left lower leg: No edema. Skin:     General: Skin is warm and dry. Capillary Refill: Capillary refill takes less than 2 seconds. Findings: No rash. Neurological:      General: No focal deficit present. Mental Status: She is alert and oriented to person, place, and time. GCS: GCS eye subscore is 4. GCS verbal subscore is 5. GCS motor subscore is 6. Sensory: No sensory deficit. Motor: No abnormal muscle tone. Gait: Gait normal.   Psychiatric:         Mood and Affect: Mood normal.         Speech: Speech normal.         Behavior: Behavior normal. Behavior is cooperative.          Vital Signs  ED Triage Vitals [11/19/23 1431]   Temperature Pulse Respirations Blood Pressure SpO2   98.8 °F (37.1 °C) 84 16 140/74 94 %      Temp Source Heart Rate Source Patient Position - Orthostatic VS BP Location FiO2 (%)   Temporal Monitor Sitting Right arm --      Pain Score       8           Vitals:    11/19/23 1431   BP: 140/74   Pulse: 84   Patient Position - Orthostatic VS: Sitting         Visual Acuity      ED Medications  Medications   multi-electrolyte (ISOLYTE-S PH 7.4) bolus 1,000 mL (0 mL Intravenous Stopped 11/19/23 1649)   ondansetron (ZOFRAN) injection 4 mg (4 mg Intravenous Given 11/19/23 1454)   ketorolac (TORADOL) injection 15 mg (15 mg Intravenous Given 11/19/23 1454)   iohexol (OMNIPAQUE) 350 MG/ML injection (MULTI-DOSE) 100 mL (100 mL Intravenous Given 11/19/23 1529)   ciprofloxacin (CIPRO) tablet 500 mg (500 mg Oral Given 11/19/23 1714)   metroNIDAZOLE (FLAGYL) tablet 500 mg (500 mg Oral Given 11/19/23 8692)       Diagnostic Studies  Results Reviewed       Procedure Component Value Units Date/Time    Urine Microscopic [[id number]]  (Normal) Collected: 11/19/23 1458    Lab Status: Final result Specimen: Urine, Clean Catch Updated: 11/19/23 1529     RBC, UA 0-5 /hpf      WBC, UA 0-1 /hpf      Epithelial Cells Occasional /hpf      Bacteria, UA None Seen /hpf     Comprehensive metabolic panel [[id number]]  (Abnormal) Collected: 11/19/23 1457    Lab Status: Final result Specimen: Blood from Arm, Right Updated: 11/19/23 1519     Sodium 139 mmol/L      Potassium 3.6 mmol/L      Chloride 101 mmol/L      CO2 28 mmol/L      ANION GAP 10 mmol/L      BUN 13 mg/dL      Creatinine 0.62 mg/dL      Glucose 135 mg/dL      Calcium 10.2 mg/dL      AST 12 U/L      ALT 18 U/L      Alkaline Phosphatase 65 U/L      Total Protein 7.8 g/dL      Albumin 4.9 g/dL      Total Bilirubin 0.67 mg/dL      eGFR 96 ml/min/1.73sq m     Narrative:      [place] guidelines for Chronic Kidney Disease (CKD):     Stage 1 with normal or high GFR (GFR > 90 mL/min/1.73 square meters)    Stage 2 Mild CKD (GFR = 60-89 mL/min/1.73 square meters)    Stage 3A Moderate CKD (GFR = 45-59 mL/min/1.73 square meters)    Stage 3B Moderate CKD (GFR = 30-44 mL/min/1.73 square meters)    Stage 4 Severe CKD (GFR = 15-29 mL/min/1.73 square meters)    Stage 5 End Stage CKD (GFR <15 mL/min/1.73 square meters)  Note: GFR calculation is accurate only with a steady state creatinine    Lipase [[id number]]  (Normal) Collected: 11/19/23 1457    Lab Status: Final result Specimen: Blood from Arm, Right Updated: 11/19/23 1519     Lipase 30 u/L     UA w Reflex to Microscopic w Reflex to Culture [[id number]]  (Abnormal) Collected: 11/19/23 1458    Lab Status: Final result Specimen: Urine, Clean Catch Updated: 11/19/23 1504     Color, UA Yellow     Clarity, UA Clear     Specific Gravity, UA 1.010     pH, UA 6.0 Leukocytes, UA Elevated glucose may cause decreased leukocyte values. See urine microscopic for UWBC result     Nitrite, UA Negative     Protein, UA Negative mg/dl      Glucose, UA >=1000 (1%) mg/dl      Ketones, UA Negative mg/dl      Urobilinogen, UA 0.2 E.U./dl      Bilirubin, UA Negative     Occult Blood, UA Negative    CBC and differential [[id number]]  (Abnormal) Collected: 11/19/23 1457    Lab Status: Final result Specimen: Blood from Arm, Right Updated: 11/19/23 1503     WBC 12.23 Thousand/uL      RBC 4.78 Million/uL      Hemoglobin 14.6 g/dL      Hematocrit 45.2 %      MCV 95 fL      MCH 30.5 pg      MCHC 32.3 g/dL      RDW 12.5 %      MPV 10.2 fL      Platelets 926 Thousands/uL      nRBC 0 /100 WBCs      Neutrophils Relative 67 %      Immat GRANS % 0 %      Lymphocytes Relative 25 %      Monocytes Relative 7 %      Eosinophils Relative 1 %      Basophils Relative 0 %      Neutrophils Absolute 8.11 Thousands/µL      Immature Grans Absolute 0.03 Thousand/uL      Lymphocytes Absolute 3.07 Thousands/µL      Monocytes Absolute 0.81 Thousand/µL      Eosinophils Absolute 0.16 Thousand/µL      Basophils Absolute 0.05 Thousands/µL                    CT abdomen pelvis with contrast   Final Result by Tomasz Carranza MD (11/19 1656)      Segment of moderate colonic wall thickening with mild pericolonic inflammatory stranding involving the mid sigmoid colon with multiple diverticula most compatible with acute diverticulitis. There is no evidence of perforation. No large or small bowel    obstruction. Workstation performed: [id number]                    Procedures  Procedures         ED Course  ED Course as of 11/19/23 1739   Sun Nov 19, 2023   1432 Prior records reviewed. Pt was seen by OB/GYN on 10/2/23 for leiomyoma of uterus, thickened endometrium. 1445 Has had prior ED visit for epigastric pain (pt notes this pain is different as currently it is all in lower abdomen).   CT abd/pelv performed on 6/21/23 was reviewed and showed - "IMPRESSION:     1. No definite acute abnormality in the abdomen or pelvis. 2.  Tiny focus of gas in the urinary bladder. Correlation with recent instrumentation recommended. 3.  Subcentimeter focus of apparent enhancement in the endometrium. Polyp/neoplasm not excluded. Further evaluation with nonemergent ultrasound is recommended. 4.  Small bowel mesenteric stranding with increased number of prominent to the nonpathologically enlarged lymph nodes. This likely represents mild mesenteric panniculitis though there are no prior comparison studies to establish stability. As an early   lymphoproliferative disease can appear similar, a 6-month follow-up CT of the abdomen/pelvis is recommended. "   1503 WBC(!): 12.23  Mildly elevated, previously normal three months ago   1504 Hemoglobin: 14.6   1504 Platelet Count: 928   1504 Glucose, UA(!): >=1000 (1%)  Pt is on jardiance; UA otherwise not suggestive of infection. 1522 Glucose, Random: 135   1522 Creatinine: 0.62   1522 BUN: 13   1522 Sodium: 139   1522 Potassium: 3.6   1522 Chloride: 101   1522 CO2: 28   1522 Anion Gap: 10   1522 Calcium: 10.2   1522 AST(!): 12   1522 ALT: 18   1522 Alkaline Phosphatase: 65   1522 Total Protein: 7.8   1522 Albumin: 4.9   1522 TOTAL BILIRUBIN: 0.67   1522 eGFR: 96   1522 Lipase: 30  Not c/w pancreatitis   1539 Bacteria, UA: None Seen   1634 Pt reassessed. She reports she is feeling pretty good. Awaiting CT results. 1658 CT abdomen pelvis with contrast  IMPRESSION:     Segment of moderate colonic wall thickening with mild pericolonic inflammatory stranding involving the mid sigmoid colon with multiple diverticula most compatible with acute diverticulitis. There is no evidence of perforation. No large or small bowel obstruction. 12 Pt and spouse updated. She had colonoscopy 3 years ago, due in 2 years. She is feeling improved. States appetite is good and feeling hungry.   Reviewed usual course and treatment of diverticulitis. She notes familiarity with this due to family members. She has been told during last colonoscopy that she had diverticulosis. SBIRT 20yo+      Flowsheet Row Most Recent Value   Initial Alcohol Screen: US AUDIT-C     1. How often do you have a drink containing alcohol? 0 Filed at: 11/19/2023 1430   2. How many drinks containing alcohol do you have on a typical day you are drinking? 0 Filed at: 11/19/2023 1430   3a. Male UNDER 65: How often do you have five or more drinks on one occasion? 0 Filed at: 11/19/2023 1430   3b. FEMALE Any Age, or MALE 65+: How often do you have 4 or more drinks on one occassion? 0 Filed at: 11/19/2023 1430   Audit-C Score 0 Filed at: 11/19/2023 1430   FCO: How many times in the past year have you. .. Used an illegal drug or used a prescription medication for non-medical reasons? Never Filed at: 11/19/2023 1430                      Medical Decision Making  58 yo female presenting for evaluation of abdominal pain. Will obtain labs, urinalysis as well as CT abd/pelv to further evaluate. Reviewed broad differential diagnosis. Given abnormals on prior CT scan, will repeat imaging. Pt agreeable. Pt afebrile, hemodynamically stable. Will provide analgesia, anti-emetic and fluids while work up obtained. Work up obtained as noted above. Mild leukocytosis, no anemia. No hypo or hyperglycemia. Renal function within normal limits. Electrolytes within normal limits. UA not suggestive of infection. CT scan suggests acute mild diverticulitis of a segment of sigmoid colon. No noted complication such as abscess or perforation. No evidence of bowel obstruction. Clinically do not suspect sepsis. Pt feeling improved, tolerating PO. Vitals stable. Will discharge with continued symptomatic management and outpatient follow up. Reviewed usual course and treatment. Anticipatory guidance provided.   Diet discussed. Is UTD on colonoscopy screening. Would continue current screening recommendations unless further issues would arise. Strict return precautions outlined. Advised recheck with PCP or return to ER as needed. Pt and spouse voiced understanding, all questions answered. Please refer to above ER course for further details/discussion. Problems Addressed:  Diverticulitis: acute illness or injury     Details: Rx cipro/flagyl. Advised no EtOH while taking. Nausea: acute illness or injury     Details: Rx zofran    Amount and/or Complexity of Data Reviewed  External Data Reviewed: labs, radiology and notes. Labs: ordered. Decision-making details documented in ED Course. Radiology: ordered. Decision-making details documented in ED Course. Risk  OTC drugs. Prescription drug management. Disposition  Final diagnoses:   Diverticulitis   Nausea     Time reflects when diagnosis was documented in both MDM as applicable and the Disposition within this note       Time User Action Codes Description Comment    11/19/2023  5:08 PM Dona Ana Beagle Add [K57.92] Diverticulitis     11/19/2023  5:08 PM Dona Ana Beagle Add [R11.0] Nausea           ED Disposition       ED Disposition   Discharge    Condition   Stable    Date/Time   Sun Nov 19, 2023 [address] discharge to home/self care.                    Follow-up Information       Follow up With Specialties Details Why Contact Info Additional Information    Kayode Gan MD Family Medicine Schedule an appointment as soon as possible for a visit   [address] [facility] [phone]       [facility] Emergency Department Emergency Medicine  As needed [address] [facility] [id number]  [address] [facility] Emergency Department, [address] [place] [address], [id number]            Discharge Medication List as of 11/19/2023  5:10 PM        START taking these medications    Details   ciprofloxacin (CIPRO) 500 mg tablet Take 1 tablet (500 mg total) by mouth every 12 (twelve) hours for 7 days, Starting Sun 11/19/2023, Until Sun 11/26/2023, Normal      metroNIDAZOLE (FLAGYL) 500 mg tablet Take 1 tablet (500 mg total) by mouth every 6 (six) hours for 7 days, Starting Sun 11/19/2023, Until Sun 11/26/2023, Normal      ondansetron (ZOFRAN-ODT) 4 mg disintegrating tablet Take 1 tablet (4 mg total) by mouth every 6 (six) hours as needed for vomiting or nausea, Starting Sun 11/19/2023, Normal           CONTINUE these medications which have NOT CHANGED    Details   Biotin 2500 MCG CAPS Take by mouth, Historical Med      Calcium Carbonate-Vitamin D 600-400 MG-UNIT per tablet Take 1 tablet by mouth, Historical Med      Cholecalciferol 1000 units tablet Take 6,000 Units by mouth, Historical Med      Cranberry 200 MG CAPS Take 200 mg by mouth daily, Historical Med      ezetimibe (Zetia) 10 mg tablet Take 1 tablet (10 mg total) by mouth daily, Starting Mon 8/21/2023, Normal      FREESTYLE LITE test strip daily Test, Starting Fri 1/21/2022, Historical Med      Jardiance 10 MG TABS tablet Take 1 tablet (10 mg total) by mouth every morning, Starting Mon 8/21/2023, Normal      Lancets (freestyle) lancets USE ONCE DAILY AS NEEDED, Historical Med      lisinopril (ZESTRIL) 10 mg tablet TAKE 1 TABLET BY MOUTH EVERY DAY, Normal      metoprolol succinate (TOPROL-XL) 50 mg 24 hr tablet TAKE 1 TABLET BY MOUTH EVERY DAY, Normal      oxybutynin (DITROPAN) 5 mg tablet Take 1 tablet (5 mg total) by mouth 3 (three) times a day as needed (bladder pain), Starting Wed 7/22/2020, Normal      rosuvastatin (CRESTOR) 20 MG tablet 40 mg daily, Starting Wed 3/3/2021, Historical Med             No discharge procedures on file.     PDMP Review       None            ED Provider  Electronically Signed by             Cory Mirza PA-C  11/19/23 9996

## 2023-12-01 ENCOUNTER — TELEPHONE (OUTPATIENT)
Dept: FAMILY MEDICINE CLINIC | Facility: CLINIC | Age: 63
End: 2023-12-01

## 2023-12-01 DIAGNOSIS — B37.0 THRUSH: Primary | ICD-10-CM

## 2023-12-01 RX ORDER — FLUCONAZOLE 100 MG/1
100 TABLET ORAL DAILY
Qty: 10 TABLET | Refills: 0 | Status: SHIPPED | OUTPATIENT
Start: 2023-12-01 | End: 2023-12-11

## 2023-12-01 NOTE — TELEPHONE ENCOUNTER
Was in the ER for diverticulitis about a week ago, was put on antibiotic but now thinks she has thrush has white coating and some burning, does she need to be seen or can something be called in, antibiotic on 11/27, she uses [facility]

## 2023-12-05 DIAGNOSIS — I10 ESSENTIAL HYPERTENSION: ICD-10-CM

## 2023-12-05 RX ORDER — LISINOPRIL 10 MG/1
TABLET ORAL
Qty: 90 TABLET | Refills: 3 | Status: SHIPPED | OUTPATIENT
Start: 2023-12-05

## 2023-12-14 ENCOUNTER — APPOINTMENT (OUTPATIENT)
Dept: LAB | Facility: CLINIC | Age: 63
End: 2023-12-14
Payer: COMMERCIAL

## 2023-12-14 DIAGNOSIS — E11.9 TYPE 2 DIABETES MELLITUS WITHOUT COMPLICATION, WITHOUT LONG-TERM CURRENT USE OF INSULIN (HCC): ICD-10-CM

## 2023-12-14 DIAGNOSIS — E78.2 MIXED HYPERLIPIDEMIA: Primary | ICD-10-CM

## 2023-12-14 DIAGNOSIS — E78.2 MIXED HYPERLIPIDEMIA: ICD-10-CM

## 2023-12-14 LAB
ANION GAP SERPL CALCULATED.3IONS-SCNC: 10 MMOL/L
BUN SERPL-MCNC: 15 MG/DL (ref 5–25)
CALCIUM SERPL-MCNC: 9.5 MG/DL (ref 8.4–10.2)
CHLORIDE SERPL-SCNC: 103 MMOL/L (ref 96–108)
CHOLEST SERPL-MCNC: 121 MG/DL
CO2 SERPL-SCNC: 26 MMOL/L (ref 21–32)
CREAT SERPL-MCNC: 0.59 MG/DL (ref 0.6–1.3)
EST. AVERAGE GLUCOSE BLD GHB EST-MCNC: 146 MG/DL
GFR SERPL CREATININE-BSD FRML MDRD: 98 ML/MIN/1.73SQ M
GLUCOSE P FAST SERPL-MCNC: 127 MG/DL (ref 65–99)
HBA1C MFR BLD: 6.7 %
HDLC SERPL-MCNC: 34 MG/DL
LDLC SERPL CALC-MCNC: 42 MG/DL (ref 0–100)
NONHDLC SERPL-MCNC: 87 MG/DL
POTASSIUM SERPL-SCNC: 4 MMOL/L (ref 3.5–5.3)
SODIUM SERPL-SCNC: 139 MMOL/L (ref 135–147)
TRIGL SERPL-MCNC: 224 MG/DL

## 2023-12-14 PROCEDURE — 36415 COLL VENOUS BLD VENIPUNCTURE: CPT

## 2023-12-14 PROCEDURE — 80061 LIPID PANEL: CPT

## 2023-12-14 PROCEDURE — 80048 BASIC METABOLIC PNL TOTAL CA: CPT

## 2023-12-14 PROCEDURE — 83036 HEMOGLOBIN GLYCOSYLATED A1C: CPT

## 2023-12-18 DIAGNOSIS — E11.9 TYPE 2 DIABETES MELLITUS WITHOUT COMPLICATION, WITHOUT LONG-TERM CURRENT USE OF INSULIN (HCC): ICD-10-CM

## 2023-12-18 RX ORDER — EMPAGLIFLOZIN 10 MG/1
TABLET, FILM COATED ORAL
Qty: 90 TABLET | Refills: 3 | Status: SHIPPED | OUTPATIENT
Start: 2023-12-18

## 2023-12-22 ENCOUNTER — TELEPHONE (OUTPATIENT)
Dept: FAMILY MEDICINE CLINIC | Facility: CLINIC | Age: 63
End: 2023-12-22

## 2023-12-26 ENCOUNTER — TELEPHONE (OUTPATIENT)
Dept: FAMILY MEDICINE CLINIC | Facility: CLINIC | Age: 63
End: 2023-12-26

## 2023-12-26 NOTE — TELEPHONE ENCOUNTER
Hi, this is Candida calling from [facility] pre encounter. I'm following up on one of Doctor Corey, excuse my cough at one of Doctor Corey's patients who is actually. She's still on the schedule for a CAT scan tomorrow morning and it was denied by the insurance. The patient is Trina Coleman. Date of birth October 12th, 1960. She's scheduled for 8:00 AM tomorrow. I don't know if the office has informed her or if the doctor's going to try to get it approved. I do show that Grady Gonzalez sent in baskets to the clinical team, the clerical team and CC the doctor on Friday the 22nd and the messages were read. But we don't see if the doctors. We don't see any other notes. We don't know if the doctor's going to do a [id number] or if the office is going to call the patient and have them cancel the CAT scan. My number is [phone]. My name is Candida. It's [phone]. Thank you.

## 2024-01-15 DIAGNOSIS — E11.9 TYPE 2 DIABETES MELLITUS WITHOUT COMPLICATION, WITHOUT LONG-TERM CURRENT USE OF INSULIN (HCC): Primary | ICD-10-CM

## 2024-02-01 DIAGNOSIS — E11.9 TYPE 2 DIABETES MELLITUS WITHOUT COMPLICATION, WITHOUT LONG-TERM CURRENT USE OF INSULIN (HCC): ICD-10-CM

## 2024-02-02 NOTE — TELEPHONE ENCOUNTER
Called [facility] and spoke to the pharmacist, Don and advised her Dr Nicole would like patient to remain on the Jardiance 10mg 2 daily due to recent BS readings from home are running in the low 100's.

## 2024-02-02 NOTE — TELEPHONE ENCOUNTER
Dr Nicole did you see my conversation with the pharmacist from [facility] about changing the dose of patient's   Jardiance 10mg. I did not get a response from you yesterday.

## 2024-02-16 DIAGNOSIS — E78.2 MIXED HYPERLIPIDEMIA: Primary | ICD-10-CM

## 2024-02-16 RX ORDER — ROSUVASTATIN CALCIUM 20 MG/1
40 TABLET, COATED ORAL DAILY
Qty: 90 TABLET | Refills: 1 | Status: SHIPPED | OUTPATIENT
Start: 2024-02-16 | End: 2024-02-16 | Stop reason: CLARIF

## 2024-02-16 RX ORDER — ROSUVASTATIN CALCIUM 40 MG/1
40 TABLET, COATED ORAL DAILY
COMMUNITY
Start: 2023-11-28 | End: 2024-02-16 | Stop reason: SDUPTHER

## 2024-02-16 RX ORDER — ROSUVASTATIN CALCIUM 40 MG/1
40 TABLET, COATED ORAL DAILY
Qty: 90 TABLET | Refills: 1 | Status: SHIPPED | OUTPATIENT
Start: 2024-02-16

## 2024-03-27 ENCOUNTER — APPOINTMENT (OUTPATIENT)
Dept: LAB | Facility: CLINIC | Age: 64
End: 2024-03-27
Payer: COMMERCIAL

## 2024-03-27 DIAGNOSIS — E78.2 MIXED HYPERLIPIDEMIA: ICD-10-CM

## 2024-03-27 DIAGNOSIS — E11.9 TYPE 2 DIABETES MELLITUS WITHOUT COMPLICATION, WITHOUT LONG-TERM CURRENT USE OF INSULIN (HCC): ICD-10-CM

## 2024-03-27 LAB
CHOLEST SERPL-MCNC: 114 MG/DL
EST. AVERAGE GLUCOSE BLD GHB EST-MCNC: 143 MG/DL
HBA1C MFR BLD: 6.6 %
HDLC SERPL-MCNC: 39 MG/DL
LDLC SERPL CALC-MCNC: 33 MG/DL (ref 0–100)
NONHDLC SERPL-MCNC: 75 MG/DL
TRIGL SERPL-MCNC: 212 MG/DL

## 2024-03-27 PROCEDURE — 80061 LIPID PANEL: CPT

## 2024-03-27 PROCEDURE — 36415 COLL VENOUS BLD VENIPUNCTURE: CPT

## 2024-03-27 PROCEDURE — 83036 HEMOGLOBIN GLYCOSYLATED A1C: CPT

## 2024-03-28 DIAGNOSIS — E11.9 TYPE 2 DIABETES MELLITUS WITHOUT COMPLICATION, WITHOUT LONG-TERM CURRENT USE OF INSULIN (HCC): ICD-10-CM

## 2024-03-28 DIAGNOSIS — E78.2 MIXED HYPERLIPIDEMIA: Primary | ICD-10-CM

## 2024-04-03 ENCOUNTER — ANNUAL EXAM (OUTPATIENT)
Dept: OBGYN CLINIC | Facility: CLINIC | Age: 64
End: 2024-04-03
Payer: COMMERCIAL

## 2024-04-03 VITALS
HEIGHT: 65 IN | BODY MASS INDEX: 24.66 KG/M2 | WEIGHT: 148 LBS | DIASTOLIC BLOOD PRESSURE: 74 MMHG | SYSTOLIC BLOOD PRESSURE: 120 MMHG

## 2024-04-03 DIAGNOSIS — Z01.419 ENCNTR FOR GYN EXAM (GENERAL) (ROUTINE) W/O ABN FINDINGS: Primary | ICD-10-CM

## 2024-04-03 DIAGNOSIS — Z12.31 ENCOUNTER FOR SCREENING MAMMOGRAM FOR MALIGNANT NEOPLASM OF BREAST: ICD-10-CM

## 2024-04-03 DIAGNOSIS — R92.30 DENSE BREAST TISSUE ON MAMMOGRAM, UNSPECIFIED TYPE: ICD-10-CM

## 2024-04-03 PROCEDURE — 99396 PREV VISIT EST AGE 40-64: CPT | Performed by: PHYSICIAN ASSISTANT

## 2024-04-03 PROCEDURE — G0476 HPV COMBO ASSAY CA SCREEN: HCPCS | Performed by: PHYSICIAN ASSISTANT

## 2024-04-03 PROCEDURE — G0145 SCR C/V CYTO,THINLAYER,RESCR: HCPCS | Performed by: PHYSICIAN ASSISTANT

## 2024-04-03 NOTE — PROGRESS NOTES
Patient presents for a routine annual visit  PMB- none   Last Pap Smear- 2021 -/-  Pap today   Mammogram- 2024  Referral given   Colonoscopy- 2021  5 yr recall     nonsmoker  Currently sexually active - one partner   No family history of ovarian or cervical  MA and PA breast cancer  Ma uterine   No concerns/questions for today's visit

## 2024-04-03 NOTE — PROGRESS NOTES
Assessment   63 y.o.  presenting for annual exam.     Plan   Diagnoses and all orders for this visit:    Encntr for gyn exam (general) (routine) w/o abn findings  -     Liquid-based pap, screening    Encounter for screening mammogram for malignant neoplasm of breast  -     Mammo screening bilateral w 3d & cad; Future    Dense breast tissue on mammogram, unspecified type  -     US breast screening bilateral complete (ABUS); Future        Pap collected   Mammo slip given. Slip given for ABUS which pt completed with [facility] last year. Advised she contact her insurance company to determine coverage prior to scheduling   Colonoscopy up to date   Incidental finding of mildly thickened endometrium on CT for abdominal pain in the fall. Stripe 5 mm. She had follow up visit with Dr. Seymour on 10/2023 who reviewed finding of 5 mm stripe in a  with the absence of bleeding is considered normal. He counseled her to call with any bleeding as this would warrant sampling. She has not experienced any vaginal bleeding. We reviewed the option of endometrial sampling regardless of bleeding if pt would feel reassured by this. She declines sampling and will call right away with any vaginal bleeding.    Perineal hygiene reviewed. Weight bearing exercises minium of 150 mins/weekly advised. Kegel exercises recommended.   SBE encouraged, A yearly mammogram is recommended for breast cancer screening starting at age 40. ASCCP guidelines reviewed. Calcium/ Vit D dietary requirements discussed.   Advised to call with any issues, all concerns & questions addressed.   See provided information in your after visit summary     F/U Annually and PRN    Results will be released to [employer], if abnormal will call or message to review and discuss treatment plan.     __________________________________________________________________    Subjective     Trina Coleman is a 63 y.o.  presenting for annual exam.     Incidental finding of mildly thickened  endometrium on CT for abdominal pain in the fall. Stripe 5 mm. She had follow up visit with Dr. Seymour on 10/2023 who reviewed finding of 5 mm stripe in a  with the absence of bleeding is considered normal. He counseled her to call with any bleeding as this would warrant sampling. She has not experienced any vaginal bleeding.    SCREENING  Last Pap: 3/9/2021 neg/neg  Last Mammo: 2024 birads 1; dense breasts; lieftime risk 15%  Last Colonoscopy: 2021 5 year recall      GYN  , no VB     Sexually active: Yes - single partner - male    Hx Abnormal pap: abnormal pap in the 80s; followed by [facility]o; normal paps since   We reviewed ASCCP guidelines for Pap testing today.    Denies vaginal discharge, itching, odor, dyspareunia, pelvic pain and vulvar/vaginal symptoms      OB           Complaints: denies   Denies urgency, frequency, hematuria, leakage / change in stream, difficulty urinating.       BREAST  Complaints: denies   Denies: breast lump, breast tenderness, nipple discharge, skin color change, and skin lesion(s)  Personal hx: hx of excisional biopsy of right breast in ; left breast lumpectomy in       Pertinent Family Hx:   Family hx of breast cancer: maternal aunt; paternal aunt  Family hx of ovarian cancer: no  Family hx of colon cancer: no      GENERAL  PMH reviewed/updated and is as below.   Patient does follow with a PCP.    SOCIAL  Smoking: no  Alcohol:social  Drug: no      Past Medical History:   Diagnosis Date    Abnormal Pap smear of cervix     Colon polyps     Diabetes mellitus (HCC) 2021    Taking jardiance 10mg    Fibroadenoma     Forceps delivery      daughter    Headache     History of cardiac monitoring 2006    Flutter in variety of situations.  She did not feel any of the PVCs or PACs    History of echocardiogram 2016    EF 55%, mild TR    History of nuclear stress test 2006    Adenosine MPI:  Normal EKG response.  No ischemia.  Probable soft  tissue attenuation.  No definite evidence of scar.    HPV (human papilloma virus) infection     Hyperlipemia     Hypertension     Mild dysplasia of cervix     Palpitations     Shingles 02/2022    SVT (supraventricular tachycardia)     Urinary tract infection     Uses oral contraceptives        Past Surgical History:   Procedure Laterality Date    AV NODE ABLATION  05/10/2016    s/p successful AV iván ablation    BREAST EXCISIONAL BIOPSY Right 1998    BREAST LUMPECTOMY Left 1981    CERVICAL CONE BIOPSY  1988    COLONOSCOPY  2013         Current Outpatient Medications:     Biotin 2500 MCG CAPS, Take by mouth, Disp: , Rfl:     Calcium Carbonate-Vitamin D 600-400 MG-UNIT per tablet, Take 1 tablet by mouth, Disp: , Rfl:     Cholecalciferol 1000 units tablet, Take 6,000 Units by mouth, Disp: , Rfl:     Cranberry 200 MG CAPS, Take 200 mg by mouth daily, Disp: , Rfl:     Empagliflozin (Jardiance) 10 MG TABS tablet, TAKE 2 TABLETS BY MOUTH EACH MORNING BEFORE BREAKAST, Disp: 90 tablet, Rfl: 3    ezetimibe (Zetia) 10 mg tablet, Take 1 tablet (10 mg total) by mouth daily, Disp: 90 tablet, Rfl: 3    FREESTYLE LITE test strip, daily Test, Disp: , Rfl:     Lancets (freestyle) lancets, USE ONCE DAILY AS NEEDED, Disp: , Rfl:     lisinopril (ZESTRIL) 10 mg tablet, TAKE 1 TABLET BY MOUTH EVERY DAY, Disp: 90 tablet, Rfl: 3    metoprolol succinate (TOPROL-XL) 50 mg 24 hr tablet, TAKE 1 TABLET BY MOUTH EVERY DAY, Disp: 90 tablet, Rfl: 3    oxybutynin (DITROPAN) 5 mg tablet, Take 1 tablet (5 mg total) by mouth 3 (three) times a day as needed (bladder pain), Disp: 90 tablet, Rfl: 5    rosuvastatin (CRESTOR) 40 MG tablet, Take 1 tablet (40 mg total) by mouth daily, Disp: 90 tablet, Rfl: 1    ondansetron (ZOFRAN-ODT) 4 mg disintegrating tablet, Take 1 tablet (4 mg total) by mouth every 6 (six) hours as needed for vomiting or nausea (Patient not taking: Reported on 4/3/2024), Disp: 12 tablet, Rfl: 0    No Known Allergies    Social History  "    Socioeconomic History    Marital status: /Civil Union     Spouse name: Not on file    Number of children: Not on file    Years of education: Not on file    Highest education level: Not on file   Occupational History    Not on file   Tobacco Use    Smoking status: Never    Smokeless tobacco: Never   Vaping Use    Vaping status: Never Used   Substance and Sexual Activity    Alcohol use: Yes     Comment: Only drink socially which is not often    Drug use: No    Sexual activity: Yes     Partners: Male     Birth control/protection: Post-menopausal, Male Sterilization   Other Topics Concern    Not on file   Social History Narrative    Not on file     Social Determinants of Health     Financial Resource Strain: Not on file   Food Insecurity: Not on file   Transportation Needs: Not on file   Physical Activity: Not on file   Stress: Not on file   Social Connections: Not on file   Intimate Partner Violence: Not on file   Housing Stability: Not on file       Review of Systems     ROS:  Constitutional: Negative for fatigue and unexpected weight change.   Respiratory: Negative for cough and shortness of breath.    Cardiovascular: Negative for chest pain and palpitations.   Gastrointestinal: Negative for abdominal pain and change in bowel habits  Breasts:  Negative, other than as noted above.   Genitourinary: Negative, other than as noted above.   Psychiatric: Negative for mood difficulties.      Objective      /74 (BP Location: Left arm, Patient Position: Sitting, Cuff Size: Standard)   Ht 5' 5\" (1.651 m)   Wt 67.1 kg (148 lb)   BMI 24.63 kg/m²     Physical Examination:    Patient appears well and is not in distress  Neck is supple without masses, no cervical or supraclavicular lymphadenopathy  Cardiovascular: regular rate and rhythm; no murmurs  Lungs: clear to auscultation bilaterally; no wheezes  Breasts are symmetrical without mass, tenderness, nipple discharge, skin changes or adenopathy.   Abdomen is " soft and nontender without masses.   External genitals are normal without lesions or rashes.  Urethral meatus and urethra are normal  Bladder is normal to palpation  Vagina is normal without discharge or bleeding.   Cervix is normal without discharge or lesion.   Uterus is normal, mobile, nontender without palpable mass.  Adnexa are normal, nontender, without palpable mass.

## 2024-04-11 LAB
LAB AP GYN PRIMARY INTERPRETATION: NORMAL
Lab: NORMAL

## 2024-06-11 DIAGNOSIS — I10 ESSENTIAL HYPERTENSION: ICD-10-CM

## 2024-06-11 RX ORDER — METOPROLOL SUCCINATE 50 MG/1
TABLET, EXTENDED RELEASE ORAL
Qty: 90 TABLET | Refills: 3 | Status: SHIPPED | OUTPATIENT
Start: 2024-06-11

## 2024-07-01 ENCOUNTER — APPOINTMENT (OUTPATIENT)
Dept: LAB | Facility: CLINIC | Age: 64
End: 2024-07-01
Payer: COMMERCIAL

## 2024-07-01 DIAGNOSIS — E78.2 MIXED HYPERLIPIDEMIA: ICD-10-CM

## 2024-07-01 DIAGNOSIS — E11.9 TYPE 2 DIABETES MELLITUS WITHOUT COMPLICATION, WITHOUT LONG-TERM CURRENT USE OF INSULIN (HCC): ICD-10-CM

## 2024-07-01 LAB
CHOLEST SERPL-MCNC: 124 MG/DL
EST. AVERAGE GLUCOSE BLD GHB EST-MCNC: 137 MG/DL
HBA1C MFR BLD: 6.4 %
HDLC SERPL-MCNC: 38 MG/DL
LDLC SERPL CALC-MCNC: 40 MG/DL (ref 0–100)
TRIGL SERPL-MCNC: 229 MG/DL

## 2024-07-01 PROCEDURE — 80061 LIPID PANEL: CPT

## 2024-07-01 PROCEDURE — 36415 COLL VENOUS BLD VENIPUNCTURE: CPT

## 2024-07-01 PROCEDURE — 83036 HEMOGLOBIN GLYCOSYLATED A1C: CPT

## 2024-07-20 DIAGNOSIS — E11.9 TYPE 2 DIABETES MELLITUS WITHOUT COMPLICATION, WITHOUT LONG-TERM CURRENT USE OF INSULIN (HCC): ICD-10-CM

## 2024-08-03 DIAGNOSIS — I10 ESSENTIAL HYPERTENSION: ICD-10-CM

## 2024-08-05 RX ORDER — METOPROLOL SUCCINATE 50 MG/1
TABLET, EXTENDED RELEASE ORAL
Qty: 90 TABLET | Refills: 4 | Status: SHIPPED | OUTPATIENT
Start: 2024-08-05

## 2024-08-07 DIAGNOSIS — E78.2 MIXED HYPERLIPIDEMIA: ICD-10-CM

## 2024-08-07 RX ORDER — EZETIMIBE 10 MG/1
10 TABLET ORAL DAILY
Qty: 90 TABLET | Refills: 1 | Status: SHIPPED | OUTPATIENT
Start: 2024-08-07

## 2024-08-07 RX ORDER — ROSUVASTATIN CALCIUM 40 MG/1
40 TABLET, COATED ORAL DAILY
Qty: 90 TABLET | Refills: 1 | Status: SHIPPED | OUTPATIENT
Start: 2024-08-07

## 2024-08-21 ENCOUNTER — TELEPHONE (OUTPATIENT)
Dept: ADMINISTRATIVE | Facility: OTHER | Age: 64
End: 2024-08-21

## 2024-08-21 ENCOUNTER — OFFICE VISIT (OUTPATIENT)
Dept: FAMILY MEDICINE CLINIC | Facility: CLINIC | Age: 64
End: 2024-08-21
Payer: COMMERCIAL

## 2024-08-21 VITALS
SYSTOLIC BLOOD PRESSURE: 110 MMHG | BODY MASS INDEX: 25.49 KG/M2 | HEART RATE: 78 BPM | OXYGEN SATURATION: 98 % | HEIGHT: 65 IN | DIASTOLIC BLOOD PRESSURE: 76 MMHG | WEIGHT: 153 LBS

## 2024-08-21 DIAGNOSIS — I10 ESSENTIAL HYPERTENSION: ICD-10-CM

## 2024-08-21 DIAGNOSIS — I47.10 SUPRAVENTRICULAR TACHYCARDIA: ICD-10-CM

## 2024-08-21 DIAGNOSIS — D25.0 INTRAMURAL, SUBMUCOUS, AND SUBSEROUS LEIOMYOMA OF UTERUS: ICD-10-CM

## 2024-08-21 DIAGNOSIS — D25.1 INTRAMURAL, SUBMUCOUS, AND SUBSEROUS LEIOMYOMA OF UTERUS: ICD-10-CM

## 2024-08-21 DIAGNOSIS — Z00.00 ENCOUNTER FOR ANNUAL HEALTH EXAMINATION: Primary | ICD-10-CM

## 2024-08-21 DIAGNOSIS — E11.9 TYPE 2 DIABETES MELLITUS WITHOUT COMPLICATION, WITHOUT LONG-TERM CURRENT USE OF INSULIN (HCC): ICD-10-CM

## 2024-08-21 DIAGNOSIS — D25.2 INTRAMURAL, SUBMUCOUS, AND SUBSEROUS LEIOMYOMA OF UTERUS: ICD-10-CM

## 2024-08-21 DIAGNOSIS — E78.2 MIXED HYPERLIPIDEMIA: ICD-10-CM

## 2024-08-21 DIAGNOSIS — Z13.820 SCREENING FOR OSTEOPOROSIS: ICD-10-CM

## 2024-08-21 PROCEDURE — 99396 PREV VISIT EST AGE 40-64: CPT | Performed by: FAMILY MEDICINE

## 2024-08-21 NOTE — ASSESSMENT & PLAN NOTE
Encouraged annual flu VAX this fall.  Really not due for Prevnar until 66 years of age.  Other immunizations are up-to-date.  routine labs ordered to be obtained 3 months from now.  Encouraged continued lifestyle.  Repeat office visit in 1 year with Dr. Rodrigues

## 2024-08-21 NOTE — PROGRESS NOTES
Adult Annual Physical  Name: Trina Coleman      : 1960      MRN: [id number]  Encounter Provider: Eleanor Nicole MD  Encounter Date: 2024   Encounter department: [facility]    Assessment & Plan   1. Encounter for annual health examination  Assessment & Plan:  Encouraged annual flu VAX this fall.  Really not due for Prevnar until 66 years of age.  Other immunizations are up-to-date.  routine labs ordered to be obtained 3 months from now.  Encouraged continued lifestyle.  Repeat office visit in 1 year with Dr. Rodrigues  2. Type 2 diabetes mellitus without complication, without long-term current use of insulin ([facility])  Assessment & Plan:  Recent hemoglobin A1c improved however still with some high readings especially in the morning.  We therefore increase her Jardiance from 20 to 25 mg daily and if sugars still unimproved within the next several weeks to call.  She is up-to-date with eye exams.  She does have slight neuropathy per exam of her left foot and does complain of intermittent numbness especially of her left second toe however nothing very bothersome and nothing keeping her awake therefore not requesting any treatment at present for this problem.  She is well aware of proper footcare  Lab Results   Component Value Date    HGBA1C 6.4 (H) 2024     Orders:  -     Empagliflozin 25 MG TABS; Take 1 tablet (25 mg total) by mouth daily  -     Lipid panel; Future; Expected date: 2024  -     TSH, 3rd generation with Free T4 reflex; Future; Expected date: 2024  -     CBC and differential; Future; Expected date: 2024  -     Comprehensive metabolic panel; Future; Expected date: 2024  -     Hemoglobin A1C; Future; Expected date: 2024  -     Albumin / creatinine urine ratio; Future; Expected date: 2024  3. Essential hypertension  Assessment & Plan:  Well-controlled therefore to continue same medications and diet  4. Mixed  hyperlipidemia  Assessment & Plan:  Cholesterol great though triglycerides slightly elevated most likely due to her diabetes.  I do not feel this warrants change of medication at present however strongly encourage diet and will monitor closely.  Orders:  -     Lipid panel; Future; Expected date: 11/21/2024  5. Supraventricular tachycardia  Assessment & Plan:  Well-controlled  6. Screening for osteoporosis  7. Intramural, submucous, and subserous leiomyoma of uterus  Assessment & Plan:  Asymptomatic and apparently stable as per recent GYN eval    Immunizations and preventive care screenings were discussed with patient today. Appropriate education was printed on patient's after visit summary.    Counseling:  Alcohol/drug use: discussed moderation in alcohol intake, the recommendations for healthy alcohol use, and avoidance of illicit drug use.  Dental Health: discussed importance of regular tooth brushing, flossing, and dental visits.  Injury prevention: discussed safety/seat belts, safety helmets, smoke detectors, carbon dioxide detectors, and smoking near bedding or upholstery.  Sexual health: discussed sexually transmitted diseases, partner selection, use of condoms, avoidance of unintended pregnancy, and contraceptive alternatives.  Exercise: the importance of regular exercise/physical activity was discussed. Recommend exercise 3-5 times per week for at least 30 minutes.       Depression Screening and Follow-up Plan: Patient was screened for depression during today's encounter. They screened negative with a PHQ-2 score of 0.        History of Present Illness     Adult Annual Physical:  Patient presents for annual physical. Patient feels great.  She is now retired.  She exercises daily walking 2 to 3 miles as well as has a treadmill and a stationary bike.  Recent routine GYN exam all being normal.  Sees Dr. Guajardo for regular eye appointments without diabetic changes and is due for repeat colon in 2 years with  "Dr. Kaur   she is concerned about a.m. sugars being elevated despite the improvement of her hemoglobin A1c.  She watches her diet carefully cheating only on rare occasion.     Diet and Physical Activity:  - Diet/Nutrition: well balanced diet.  - Exercise: moderate cardiovascular exercise and walking.    Depression Screening:  - PHQ-2 Score: 0    General Health:  - Sleep: sleeps well.  - Hearing: normal hearing bilateral ears.  - Vision: no vision problems.  - Dental: regular dental visits.    /GYN Health:  - Follows with GYN: yes.   - Menopause: postmenopausal.   - History of STDs: no  - Contraception: menopause.      Advanced Care Planning:  - Has an advanced directive?: no    - Has a durable medical POA?: no    - ACP document given to patient?: no      Review of Systems      Objective     /76 (BP Location: Left arm, Patient Position: Sitting, Cuff Size: Adult)   Pulse 78   Ht 5' 5\" (1.651 m)   Wt 69.4 kg (153 lb)   SpO2 98%   BMI 25.46 kg/m²     Physical Exam  Vitals and nursing note reviewed.   Constitutional:       General: She is not in acute distress.     Appearance: Normal appearance. She is well-developed.   HENT:      Head: Normocephalic and atraumatic.   Eyes:      Conjunctiva/sclera: Conjunctivae normal.   Neck:      Vascular: No carotid bruit.   Cardiovascular:      Rate and Rhythm: Normal rate and regular rhythm.      Pulses: Normal pulses. no weak pulses.           Dorsalis pedis pulses are 2+ on the right side and 2+ on the left side.        Posterior tibial pulses are 2+ on the right side and 2+ on the left side.      Heart sounds: Normal heart sounds. No murmur heard.  Pulmonary:      Effort: Pulmonary effort is normal. No respiratory distress.      Breath sounds: Normal breath sounds.   Abdominal:      Palpations: Abdomen is soft. There is no mass.      Tenderness: There is no abdominal tenderness.   Musculoskeletal:         General: No swelling.      Cervical back: Neck supple.      " Right lower leg: No edema.      Left lower leg: No edema.   Feet:      Right foot:      Skin integrity: No ulcer, skin breakdown, erythema, warmth, callus or dry skin.      Left foot:      Skin integrity: No ulcer, skin breakdown, erythema, warmth, callus or dry skin.   Lymphadenopathy:      Cervical: No cervical adenopathy.   Skin:     General: Skin is warm and dry.      Capillary Refill: Capillary refill takes less than 2 seconds.   Neurological:      Mental Status: She is alert.   Psychiatric:         Mood and Affect: Mood normal.     Diabetic Foot Exam    Patient's shoes and socks removed.    Right Foot/Ankle   Right Foot Inspection  Skin Exam: skin normal and skin intact. No dry skin, no warmth, no callus, no erythema, no maceration, no abnormal color, no pre-ulcer, no ulcer and no callus.     Toe Exam: ROM and strength within normal limits.     Sensory   Monofilament testing: intact    Vascular  Capillary refills: < 3 seconds  The right DP pulse is 2+. The right PT pulse is 2+.     Left Foot/Ankle  Left Foot Inspection  Skin Exam: skin normal and skin intact. No dry skin, no warmth, no erythema, no maceration, normal color, no pre-ulcer, no ulcer and no callus.     Toe Exam: ROM and strength within normal limits.     Sensory   Monofilament testing: diminished    Vascular  Capillary refills: < 3 seconds  The left DP pulse is 2+. The left PT pulse is 2+.     Assign Risk Category  No deformity present  Loss of protective sensation  No weak pulses  Risk: 1

## 2024-08-21 NOTE — ASSESSMENT & PLAN NOTE
Recent hemoglobin A1c improved however still with some high readings especially in the morning.  We therefore increase her Jardiance from 20 to 25 mg daily and if sugars still unimproved within the next several weeks to call.  She is up-to-date with eye exams.  She does have slight neuropathy per exam of her left foot and does complain of intermittent numbness especially of her left second toe however nothing very bothersome and nothing keeping her awake therefore not requesting any treatment at present for this problem.  She is well aware of proper footcare  Lab Results   Component Value Date    HGBA1C 6.4 (H) 07/01/2024

## 2024-08-21 NOTE — TELEPHONE ENCOUNTER
Upon review of the In Basket request and the patient's chart, initial outreach has been made via fax to facility. Please see Contacts section for details.     Thank you  Cass Beth MA

## 2024-08-21 NOTE — TELEPHONE ENCOUNTER
----- Message from Milka ROUSE sent at 8/21/2024  9:48 AM EDT -----  Regarding: dm eye  08/21/24 9:48 AM    Hello, our patient Trina Coleman has had Diabetic Eye Exam completed/performed. Please assist in updating the patient chart by making an External outreach to [facility] . The date of service is 05/2024.    Thank you,  [facility]

## 2024-08-21 NOTE — ASSESSMENT & PLAN NOTE
Cholesterol great though triglycerides slightly elevated most likely due to her diabetes.  I do not feel this warrants change of medication at present however strongly encourage diet and will monitor closely.

## 2024-08-21 NOTE — LETTER
Diabetic Eye Exam Form    Date Requested: 24  Patient: Trina Coleman  Patient : 1960   Referring Provider: Claudine Allred DO      DIABETIC Eye Exam Date _______________________________      Type of Exam MUST be documented for Diabetic Eye Exams. Please CHECK ONE.     Retinal Exam       Dilated Retinal Exam       OCT       Optomap-Iris Exam      Fundus Photography       Left Eye - Please check Retinopathy or No Retinopathy        Exam did show retinopathy    Exam did not show retinopathy       Right Eye - Please check Retinopathy or No Retinopathy       Exam did show retinopathy    Exam did not show retinopathy       Comments __________________________________________________________    Practice Providing Exam ______________________________________________    Exam Performed By (print name) _______________________________________      Provider Signature ___________________________________________________      These reports are needed for  compliance.  Please fax this completed form and a copy of the Diabetic Eye Exam report to our office located at [address] [place] [address] as soon as possible via Fax [phone] attention Cass: Phone [phone]  We thank you for your assistance in treating our mutual patient.

## 2024-08-22 NOTE — TELEPHONE ENCOUNTER
Upon review of the In Basket request we were able to locate, review, and update the patient chart as requested for Diabetic Eye Exam.    Any additional questions or concerns should be emailed to the Practice Liaisons via the appropriate education email address, please do not reply via In Basket.    Thank you  Cass Beth MA   [facility]

## 2024-09-11 ENCOUNTER — TELEPHONE (OUTPATIENT)
Dept: FAMILY MEDICINE CLINIC | Facility: CLINIC | Age: 64
End: 2024-09-11

## 2024-10-16 ENCOUNTER — TELEPHONE (OUTPATIENT)
Age: 64
End: 2024-10-16

## 2024-11-22 ENCOUNTER — RESULTS FOLLOW-UP (OUTPATIENT)
Dept: FAMILY MEDICINE CLINIC | Facility: CLINIC | Age: 64
End: 2024-11-22

## 2024-11-22 ENCOUNTER — APPOINTMENT (OUTPATIENT)
Dept: LAB | Facility: CLINIC | Age: 64
End: 2024-11-22
Payer: COMMERCIAL

## 2024-11-22 DIAGNOSIS — E11.9 TYPE 2 DIABETES MELLITUS WITHOUT COMPLICATION, WITHOUT LONG-TERM CURRENT USE OF INSULIN (HCC): ICD-10-CM

## 2024-11-22 DIAGNOSIS — E78.2 MIXED HYPERLIPIDEMIA: ICD-10-CM

## 2024-11-22 LAB
ALBUMIN SERPL BCG-MCNC: 4.8 G/DL (ref 3.5–5)
ALP SERPL-CCNC: 56 U/L (ref 34–104)
ALT SERPL W P-5'-P-CCNC: 18 U/L (ref 7–52)
ANION GAP SERPL CALCULATED.3IONS-SCNC: 11 MMOL/L (ref 4–13)
AST SERPL W P-5'-P-CCNC: 18 U/L (ref 13–39)
BASOPHILS # BLD AUTO: 0.08 THOUSANDS/ÂΜL (ref 0–0.1)
BASOPHILS NFR BLD AUTO: 1 % (ref 0–1)
BILIRUB SERPL-MCNC: 0.62 MG/DL (ref 0.2–1)
BUN SERPL-MCNC: 19 MG/DL (ref 5–25)
CALCIUM SERPL-MCNC: 9.5 MG/DL (ref 8.4–10.2)
CHLORIDE SERPL-SCNC: 103 MMOL/L (ref 96–108)
CHOLEST SERPL-MCNC: 124 MG/DL (ref ?–200)
CO2 SERPL-SCNC: 26 MMOL/L (ref 21–32)
CREAT SERPL-MCNC: 0.57 MG/DL (ref 0.6–1.3)
CREAT UR-MCNC: 69.1 MG/DL
EOSINOPHIL # BLD AUTO: 0.15 THOUSAND/ÂΜL (ref 0–0.61)
EOSINOPHIL NFR BLD AUTO: 2 % (ref 0–6)
ERYTHROCYTE [DISTWIDTH] IN BLOOD BY AUTOMATED COUNT: 12.7 % (ref 11.6–15.1)
EST. AVERAGE GLUCOSE BLD GHB EST-MCNC: 143 MG/DL
GFR SERPL CREATININE-BSD FRML MDRD: 98 ML/MIN/1.73SQ M
GLUCOSE P FAST SERPL-MCNC: 135 MG/DL (ref 65–99)
HBA1C MFR BLD: 6.6 %
HCT VFR BLD AUTO: 43.6 % (ref 34.8–46.1)
HDLC SERPL-MCNC: 37 MG/DL
HGB BLD-MCNC: 15 G/DL (ref 11.5–15.4)
IMM GRANULOCYTES # BLD AUTO: 0.03 THOUSAND/UL (ref 0–0.2)
IMM GRANULOCYTES NFR BLD AUTO: 0 % (ref 0–2)
LDLC SERPL CALC-MCNC: 40 MG/DL (ref 0–100)
LYMPHOCYTES # BLD AUTO: 3.13 THOUSANDS/ÂΜL (ref 0.6–4.47)
LYMPHOCYTES NFR BLD AUTO: 44 % (ref 14–44)
MCH RBC QN AUTO: 32.3 PG (ref 26.8–34.3)
MCHC RBC AUTO-ENTMCNC: 34.4 G/DL (ref 31.4–37.4)
MCV RBC AUTO: 94 FL (ref 82–98)
MICROALBUMIN UR-MCNC: 19.1 MG/L
MICROALBUMIN/CREAT 24H UR: 28 MG/G CREATININE (ref 0–30)
MONOCYTES # BLD AUTO: 0.56 THOUSAND/ÂΜL (ref 0.17–1.22)
MONOCYTES NFR BLD AUTO: 8 % (ref 4–12)
NEUTROPHILS # BLD AUTO: 3.15 THOUSANDS/ÂΜL (ref 1.85–7.62)
NEUTS SEG NFR BLD AUTO: 45 % (ref 43–75)
NONHDLC SERPL-MCNC: 87 MG/DL
NRBC BLD AUTO-RTO: 0 /100 WBCS
PLATELET # BLD AUTO: 290 THOUSANDS/UL (ref 149–390)
PMV BLD AUTO: 11 FL (ref 8.9–12.7)
POTASSIUM SERPL-SCNC: 4.2 MMOL/L (ref 3.5–5.3)
PROT SERPL-MCNC: 7.6 G/DL (ref 6.4–8.4)
RBC # BLD AUTO: 4.64 MILLION/UL (ref 3.81–5.12)
SODIUM SERPL-SCNC: 140 MMOL/L (ref 135–147)
TRIGL SERPL-MCNC: 237 MG/DL (ref ?–150)
TSH SERPL DL<=0.05 MIU/L-ACNC: 1.95 UIU/ML (ref 0.45–4.5)
WBC # BLD AUTO: 7.1 THOUSAND/UL (ref 4.31–10.16)

## 2024-11-22 PROCEDURE — 82570 ASSAY OF URINE CREATININE: CPT

## 2024-11-22 PROCEDURE — 83036 HEMOGLOBIN GLYCOSYLATED A1C: CPT

## 2024-11-22 PROCEDURE — 36415 COLL VENOUS BLD VENIPUNCTURE: CPT

## 2024-11-22 PROCEDURE — 84443 ASSAY THYROID STIM HORMONE: CPT

## 2024-11-22 PROCEDURE — 80053 COMPREHEN METABOLIC PANEL: CPT

## 2024-11-22 PROCEDURE — 80061 LIPID PANEL: CPT

## 2024-11-22 PROCEDURE — 85025 COMPLETE CBC W/AUTO DIFF WBC: CPT

## 2024-11-22 PROCEDURE — 82043 UR ALBUMIN QUANTITATIVE: CPT

## 2024-11-22 NOTE — TELEPHONE ENCOUNTER
----- Message from Eleanor Nicole MD sent at 11/22/2024 12:30 PM EST -----  Who sees this pt??    I see no notes or encounters????  ----- Message -----  From: Ping Kessler  Sent: 11/22/2024  11:22 AM EST  To: Eleanor Nicole MD

## 2024-11-25 ENCOUNTER — RESULTS FOLLOW-UP (OUTPATIENT)
Dept: FAMILY MEDICINE CLINIC | Facility: CLINIC | Age: 64
End: 2024-11-25

## 2024-11-25 DIAGNOSIS — E78.2 MIXED HYPERLIPIDEMIA: ICD-10-CM

## 2024-11-25 DIAGNOSIS — E11.9 TYPE 2 DIABETES MELLITUS WITHOUT COMPLICATION, WITHOUT LONG-TERM CURRENT USE OF INSULIN (HCC): Primary | ICD-10-CM

## 2025-01-01 DIAGNOSIS — I10 ESSENTIAL HYPERTENSION: ICD-10-CM

## 2025-01-03 RX ORDER — LISINOPRIL 10 MG/1
10 TABLET ORAL DAILY
Qty: 90 TABLET | Refills: 3 | Status: SHIPPED | OUTPATIENT
Start: 2025-01-03

## 2025-02-02 DIAGNOSIS — E78.2 MIXED HYPERLIPIDEMIA: ICD-10-CM

## 2025-02-03 RX ORDER — ROSUVASTATIN CALCIUM 40 MG/1
40 TABLET, COATED ORAL DAILY
Qty: 90 TABLET | Refills: 1 | Status: SHIPPED | OUTPATIENT
Start: 2025-02-03

## 2025-02-04 DIAGNOSIS — E11.9 TYPE 2 DIABETES MELLITUS WITHOUT COMPLICATION, WITHOUT LONG-TERM CURRENT USE OF INSULIN (HCC): ICD-10-CM

## 2025-02-04 RX ORDER — EMPAGLIFLOZIN 25 MG/1
TABLET, FILM COATED ORAL
Qty: 30 TABLET | Refills: 5 | Status: SHIPPED | OUTPATIENT
Start: 2025-02-04

## 2025-02-10 DIAGNOSIS — E78.2 MIXED HYPERLIPIDEMIA: ICD-10-CM

## 2025-02-10 RX ORDER — EZETIMIBE 10 MG/1
10 TABLET ORAL DAILY
Qty: 90 TABLET | Refills: 1 | Status: SHIPPED | OUTPATIENT
Start: 2025-02-10

## 2025-02-25 ENCOUNTER — APPOINTMENT (OUTPATIENT)
Dept: LAB | Facility: CLINIC | Age: 65
End: 2025-02-25
Payer: COMMERCIAL

## 2025-02-25 DIAGNOSIS — E11.9 TYPE 2 DIABETES MELLITUS WITHOUT COMPLICATION, WITHOUT LONG-TERM CURRENT USE OF INSULIN (HCC): ICD-10-CM

## 2025-02-25 DIAGNOSIS — E78.2 MIXED HYPERLIPIDEMIA: ICD-10-CM

## 2025-02-25 LAB
CHOLEST SERPL-MCNC: 126 MG/DL (ref ?–200)
EST. AVERAGE GLUCOSE BLD GHB EST-MCNC: 143 MG/DL
HBA1C MFR BLD: 6.6 %
HDLC SERPL-MCNC: 38 MG/DL
LDLC SERPL CALC-MCNC: 34 MG/DL (ref 0–100)
NONHDLC SERPL-MCNC: 88 MG/DL
TRIGL SERPL-MCNC: 272 MG/DL (ref ?–150)

## 2025-02-25 PROCEDURE — 36415 COLL VENOUS BLD VENIPUNCTURE: CPT

## 2025-02-25 PROCEDURE — 80061 LIPID PANEL: CPT

## 2025-02-25 PROCEDURE — 83036 HEMOGLOBIN GLYCOSYLATED A1C: CPT

## 2025-03-11 ENCOUNTER — TELEPHONE (OUTPATIENT)
Age: 65
End: 2025-03-11

## 2025-04-03 ENCOUNTER — TELEPHONE (OUTPATIENT)
Age: 65
End: 2025-04-03

## 2025-04-03 NOTE — TELEPHONE ENCOUNTER
Pt called in stating when she was seeing Dr. Nicole, Dr Nicole wanted her to keep logs of when she would check her sugar, and she would drops the logs off to Dr. Nicole.     Pt inquires to Dr. Allred if she would like her to do the same? If she should drop off the logs that she currently has?    Please advise & call pt back with update. Thank you!    Trina: [phone]   
Spoke to pt  
Controlled with current regime

## 2025-04-06 ENCOUNTER — OFFICE VISIT (OUTPATIENT)
Dept: URGENT CARE | Facility: CLINIC | Age: 65
End: 2025-04-06
Payer: COMMERCIAL

## 2025-04-06 VITALS
SYSTOLIC BLOOD PRESSURE: 147 MMHG | WEIGHT: 145 LBS | HEIGHT: 66 IN | BODY MASS INDEX: 23.3 KG/M2 | TEMPERATURE: 98.7 F | DIASTOLIC BLOOD PRESSURE: 72 MMHG | RESPIRATION RATE: 18 BRPM | OXYGEN SATURATION: 96 % | HEART RATE: 86 BPM

## 2025-04-06 DIAGNOSIS — J01.90 ACUTE VIRAL SINUSITIS: Primary | ICD-10-CM

## 2025-04-06 DIAGNOSIS — B97.89 ACUTE VIRAL SINUSITIS: Primary | ICD-10-CM

## 2025-04-06 PROCEDURE — G0382 LEV 3 HOSP TYPE B ED VISIT: HCPCS

## 2025-04-06 RX ORDER — FLUTICASONE PROPIONATE 50 MCG
1 SPRAY, SUSPENSION (ML) NASAL DAILY
Qty: 9.9 ML | Refills: 0 | Status: SHIPPED | OUTPATIENT
Start: 2025-04-06

## 2025-04-06 NOTE — PROGRESS NOTES
[facility]        NAME: Trina Coleman is a 64 y.o. female  : 1960    MRN: [id number]  DATE: 2025  TIME: 10:05 AM    Assessment and Plan   Acute viral sinusitis [J01.90, B97.89]  1. Acute viral sinusitis  fluticasone (FLONASE) 50 mcg/act nasal spray        Discussed the patient that symptoms most likely viral in nature, no concerning findings for bacterial infection on my exam.  Due to patient's past medical history advise use of Coricidin, will trial Flonase nasal spray.  Advised patient if symptoms do not improve or worsen over the next week to be reevaluated.    Patient Instructions   Take Medrol Dose Pack/Flonase as directed.  Continue OTC ibuprofen/tylenol and Decongestant/Coricidin as directed.  Warm compresses over sinuses  Steam treatment (practice proper safety precautions when handing hot liquids/steam)  Over the counter saline nasal spray  Follow up with PCP in 3-5 days.  Proceed to  ER if symptoms worsen.         Follow up with PCP in 3-5 days.  Proceed to  ER if symptoms worsen.    If tests have been performed at [facility], our office will contact you with results if changes need to be made to the care plan discussed with you at the visit.  You can review your full results on [facility].    Chief Complaint     Chief Complaint   Patient presents with    Cold Like Symptoms     Sinus pain, pressure, frontal and maxillary, bilateral ear pain, dry cough, some dizziness         History of Present Illness       64-year-old female past medical history of hypertension diabetes reporting with 4 days of sinus pressure, sinus pain, bilateral ear fullness, chills, sore throat, dry cough, headache.  Patient currently denying fevers, shortness of breath, wheezing, GI symptoms, body aches.  Patient has been alternating Advil and Tylenol with moderate relief of her headache and has been using Mucinex DM at home which has helped her cough.        Review of Systems   Review of Systems    Constitutional:  Positive for chills. Negative for fever.   HENT:  Positive for congestion, sinus pressure, sinus pain and sore throat. Negative for ear pain, postnasal drip and rhinorrhea.    Eyes:  Negative for pain and visual disturbance.   Respiratory:  Positive for cough. Negative for shortness of breath and wheezing.    Cardiovascular:  Negative for chest pain and palpitations.   Gastrointestinal:  Negative for abdominal pain, diarrhea, nausea and vomiting.   Genitourinary:  Negative for dysuria and hematuria.   Musculoskeletal:  Negative for arthralgias and back pain.   Skin:  Negative for color change and rash.   Neurological:  Positive for headaches. Negative for seizures and syncope.   All other systems reviewed and are negative.        Current Medications       Current Outpatient Medications:     Biotin 2500 MCG CAPS, Take by mouth, Disp: , Rfl:     Calcium Carbonate-Vitamin D 600-400 MG-UNIT per tablet, Take 1 tablet by mouth, Disp: , Rfl:     Cholecalciferol 1000 units tablet, Take 6,000 Units by mouth, Disp: , Rfl:     Cranberry 200 MG CAPS, Take 200 mg by mouth daily, Disp: , Rfl:     ezetimibe (ZETIA) 10 mg tablet, Take 1 tablet (10 mg total) by mouth daily, Disp: 90 tablet, Rfl: 1    fluticasone (FLONASE) 50 mcg/act nasal spray, 1 spray into each nostril daily, Disp: 9.9 mL, Rfl: 0    FREESTYLE LITE test strip, daily Test, Disp: , Rfl:     Jardiance 25 MG TABS, TAKE 1 TABLET (25 MG TOTAL) BY MOUTH DAILY., Disp: 30 tablet, Rfl: 5    Lancets (freestyle) lancets, USE ONCE DAILY AS NEEDED, Disp: , Rfl:     lisinopril (ZESTRIL) 10 mg tablet, TAKE 1 TABLET BY MOUTH EVERY DAY, Disp: 90 tablet, Rfl: 3    metoprolol succinate (TOPROL-XL) 50 mg 24 hr tablet, TAKE 1 TABLET BY MOUTH EVERY DAY, Disp: 90 tablet, Rfl: 4    oxybutynin (DITROPAN) 5 mg tablet, Take 1 tablet (5 mg total) by mouth 3 (three) times a day as needed (bladder pain), Disp: 90 tablet, Rfl: 5    rosuvastatin (CRESTOR) 40 MG tablet, TAKE 1  TABLET BY MOUTH EVERY DAY, Disp: 90 tablet, Rfl: 1    Current Allergies     Allergies as of 2025    (No Known Allergies)            The following portions of the patient's history were reviewed and updated as appropriate: allergies, current medications, past family history, past medical history, past social history, past surgical history and problem list.     Past Medical History:   Diagnosis Date    Abnormal Pap smear of cervix     Colon polyps     Diabetes mellitus (HCC) 2021    Taking jardiance 10mg    Fibroadenoma     Forceps delivery      daughter    Headache     History of cardiac monitoring 2006    Flutter in variety of situations.  She did not feel any of the PVCs or PACs    History of echocardiogram 2016    EF 55%, mild TR    History of nuclear stress test 2006    Adenosine MPI:  Normal EKG response.  No ischemia.  Probable soft tissue attenuation.  No definite evidence of scar.    HPV (human papilloma virus) infection     Hyperlipemia     Hypertension     Mild dysplasia of cervix     Palpitations     Shingles 2022    SVT (supraventricular tachycardia) ([facility])     Urinary tract infection     Uses oral contraceptives        Past Surgical History:   Procedure Laterality Date    AV NODE ABLATION  05/10/2016    s/p successful AV iván ablation    BREAST EXCISIONAL BIOPSY Right     BREAST LUMPECTOMY Left     CERVICAL CONE BIOPSY      COLONOSCOPY         Family History   Problem Relation Age of Onset    Bone cancer Mother     Hypertension Mother             Multiple myeloma Mother     Dementia Father     Diabetes Father             Seizures Father     Heart disease Father         MI in his 60s    Hyperlipidemia Father     Transient ischemic attack Father     Uterine cancer Maternal Aunt     Breast cancer Maternal Aunt     Colon cancer Maternal Aunt     Breast cancer Paternal Aunt     Cervical cancer Neg Hx          Medications have been  "verified.        Objective   /72   Pulse 86   Temp 98.7 °F (37.1 °C)   Resp 18   Ht 5' 6\" (1.676 m)   Wt 65.8 kg (145 lb)   SpO2 96%   BMI 23.40 kg/m²   No LMP recorded. Patient is postmenopausal.       Physical Exam     Physical Exam  Constitutional:       General: She is not in acute distress.     Appearance: Normal appearance. She is not ill-appearing.   HENT:      Head: Normocephalic and atraumatic.      Right Ear: Tympanic membrane, ear canal and external ear normal.      Left Ear: Tympanic membrane, ear canal and external ear normal.      Nose: Congestion present. No rhinorrhea.      Right Sinus: No maxillary sinus tenderness or frontal sinus tenderness.      Left Sinus: No maxillary sinus tenderness or frontal sinus tenderness.      Mouth/Throat:      Mouth: Mucous membranes are moist.      Pharynx: No oropharyngeal exudate or posterior oropharyngeal erythema.   Eyes:      Conjunctiva/sclera: Conjunctivae normal.      Pupils: Pupils are equal, round, and reactive to light.   Cardiovascular:      Rate and Rhythm: Normal rate and regular rhythm.      Pulses: Normal pulses.      Heart sounds: Normal heart sounds.   Pulmonary:      Effort: Pulmonary effort is normal.      Breath sounds: Normal breath sounds.   Abdominal:      General: Abdomen is flat.      Palpations: Abdomen is soft.   Skin:     General: Skin is warm and dry.      Capillary Refill: Capillary refill takes less than 2 seconds.   Neurological:      General: No focal deficit present.      Mental Status: She is alert and oriented to person, place, and time.                   "

## 2025-04-06 NOTE — PATIENT INSTRUCTIONS
Take Medrol Dose Pack/Flonase as directed.  Continue OTC ibuprofen/tylenol and Decongestant/Coricidin as directed.  Warm compresses over sinuses  Steam treatment (practice proper safety precautions when handing hot liquids/steam)  Over the counter saline nasal spray  Follow up with PCP in 3-5 days.  Proceed to  ER if symptoms worsen.

## 2025-04-28 DIAGNOSIS — J01.90 ACUTE VIRAL SINUSITIS: ICD-10-CM

## 2025-04-28 DIAGNOSIS — B97.89 ACUTE VIRAL SINUSITIS: ICD-10-CM

## 2025-04-29 RX ORDER — FLUTICASONE PROPIONATE 50 MCG
SPRAY, SUSPENSION (ML) NASAL
Qty: 48 ML | Refills: 1 | OUTPATIENT
Start: 2025-04-29

## 2025-04-29 NOTE — TELEPHONE ENCOUNTER
Medication refill requested, not appropriate at this time. Medications refills should be directed towards Primary Care Provider or if an urgent situation emerges must be seen in clinic for medication refill/prescription.

## 2025-05-15 ENCOUNTER — OFFICE VISIT (OUTPATIENT)
Dept: CARDIOLOGY CLINIC | Facility: CLINIC | Age: 65
End: 2025-05-15
Payer: COMMERCIAL

## 2025-05-15 VITALS
WEIGHT: 151 LBS | HEIGHT: 66 IN | HEART RATE: 74 BPM | BODY MASS INDEX: 24.27 KG/M2 | SYSTOLIC BLOOD PRESSURE: 110 MMHG | DIASTOLIC BLOOD PRESSURE: 78 MMHG

## 2025-05-15 DIAGNOSIS — I10 ESSENTIAL HYPERTENSION: Primary | ICD-10-CM

## 2025-05-15 DIAGNOSIS — I47.10 SUPRAVENTRICULAR TACHYCARDIA (HCC): ICD-10-CM

## 2025-05-15 DIAGNOSIS — E78.2 MIXED HYPERLIPIDEMIA: ICD-10-CM

## 2025-05-15 PROCEDURE — 99214 OFFICE O/P EST MOD 30 MIN: CPT | Performed by: INTERNAL MEDICINE

## 2025-05-15 PROCEDURE — 93000 ELECTROCARDIOGRAM COMPLETE: CPT | Performed by: INTERNAL MEDICINE

## 2025-05-15 NOTE — PROGRESS NOTES
" Patient ID: Trina Coleman is a 64 y.o. female.        Plan:      Assessment & Plan  Essential hypertension  Well controlled.  Supraventricular tachycardia (HCC)  Ablation performed in May of 2016. No symptomatic recurrence.  Mixed hyperlipidemia  Values well controlled at present.  Very strong family history of early CAD.      Follow up Plan/Other summary comments:  Patient has been free of SVT recurrence for many years.  At this point I am willing to see her on an as-needed basis.    HPI: Is seen in follow-up today regarding the above.  Since last visit she has felt well.  No chest pain or chest pressure.  No change in exertional tolerance.          Results for orders placed or performed in visit on 05/15/25   POCT ECG    Impression    Sinus rhythm.  Incomplete right bundle branch block pattern.  Otherwise normal.         Most recent or relevant cardiac/vascular testing:    TTE 5/24/2019: Normal.      Past Surgical History:   Procedure Laterality Date    AV NODE ABLATION  05/10/2016    s/p successful AV iván ablation    BREAST EXCISIONAL BIOPSY Right 1998    BREAST LUMPECTOMY Left 1981    CERVICAL CONE BIOPSY  1988    COLONOSCOPY  2013       Lipid Profile: Reviewed      Review of Systems   10  point ROS  was otherwise non pertinent or negative except as per HPI or as below.   Gait:  Normal.        Objective:     /78   Pulse 74   Ht 5' 6\" (1.676 m)   Wt 68.5 kg (151 lb)   BMI 24.37 kg/m²     PHYSICAL EXAM:    General:  Normal appearance in no distress.  Eyes:  Anicteric.  Oral mucosa:  Moist.  Neck:  No JVD. Carotid upstrokes are brisk without bruits.  No masses.  Chest:  Clear to auscultation.  Cardiac:  No palpable PMI.  Normal S1 and S2.  No murmur gallop or rub.  Abdomen:  Soft and nontender. No palpable organomegaly or aortic enlargement.  Extremities:  No peripheral edema.  Musculoskeletal:  Symmetric.   Vascular:  Femoral pulses are brisk without bruits.  Popliteal pulses are intact bilaterally. "   Pedal pulses are intact.  Neuro:  Grossly symmetric.  Psych:  Alert and oriented x3.      Meds reviewed.    Past Medical History:   Diagnosis Date    Abnormal Pap smear of cervix     Colon polyps     Diabetes mellitus (HCC) 08/2021    Taking jardiance 10mg    Fibroadenoma 1993    Forceps delivery     1994 daughter    Headache     History of cardiac monitoring 05/13/2006    Flutter in variety of situations.  She did not feel any of the PVCs or PACs    History of echocardiogram 03/11/2016    EF 55%, mild TR    History of nuclear stress test 04/18/2006    Adenosine MPI:  Normal EKG response.  No ischemia.  Probable soft tissue attenuation.  No definite evidence of scar.    HPV (human papilloma virus) infection     Hyperlipemia     Hypertension     Mild dysplasia of cervix     Palpitations     Shingles 02/2022    SVT (supraventricular tachycardia) (HCC)     Urinary tract infection     Uses oral contraceptives            Tobacco Use History[1]               [1]   Social History  Tobacco Use   Smoking Status Never   Smokeless Tobacco Never

## 2025-06-18 ENCOUNTER — ANNUAL EXAM (OUTPATIENT)
Dept: OBGYN CLINIC | Facility: CLINIC | Age: 65
End: 2025-06-18
Payer: COMMERCIAL

## 2025-06-18 VITALS
BODY MASS INDEX: 24.69 KG/M2 | WEIGHT: 148.2 LBS | HEIGHT: 65 IN | SYSTOLIC BLOOD PRESSURE: 118 MMHG | DIASTOLIC BLOOD PRESSURE: 80 MMHG

## 2025-06-18 DIAGNOSIS — Z01.419 WELL WOMAN EXAM WITH ROUTINE GYNECOLOGICAL EXAM: Primary | ICD-10-CM

## 2025-06-18 DIAGNOSIS — B37.2 SKIN CANDIDIASIS: ICD-10-CM

## 2025-06-18 PROCEDURE — S0612 ANNUAL GYNECOLOGICAL EXAMINA: HCPCS | Performed by: PHYSICIAN ASSISTANT

## 2025-06-18 RX ORDER — NYSTATIN 100000 U/G
OINTMENT TOPICAL 2 TIMES DAILY
Qty: 30 G | Refills: 1 | Status: SHIPPED | OUTPATIENT
Start: 2025-06-18 | End: 2025-07-02

## 2025-06-18 NOTE — PROGRESS NOTES
Name: Trina Coleman      : 1960      MRN: [id number]  Encounter Provider: Maia Bauer PA-C  Encounter Date: 2025   Encounter department: [facility] [place]  :  Assessment & Plan  Well woman exam with routine gynecological exam  Pap up to date   Mammo slip given   Colonoscopy up to date    Perineal hygiene reviewed. Weight bearing exercises minium of 150 mins/weekly advised. Kegel exercises recommended.   SBE encouraged, A yearly mammogram is recommended for breast cancer screening starting at age 40. ASCCP guidelines reviewed. Condoms encouraged with all sexual activity to prevent STI's. Gardisil vaccines recommended up to age 45. Calcium/ Vit D dietary requirements discussed.   Advised to call with any issues, all concerns & questions addressed.   See provided information in your after visit summary     F/U Annually and PRN    Results will be released to [facility], if abnormal will call or message to review and discuss treatment plan.        Skin candidiasis  -skin candidiasis suspected based on symptoms and appearance on exam today  -wet mount negative   -rx sent to pharmacy for topical nystatin with refills in the event symptoms recur with continue Jardiance use   Orders:    nystatin (MYCOSTATIN) ointment; Apply topically 2 (two) times a day for 14 days      History of Present Illness   HPI    Subjective     Trina Coleman is a 64 y.o.  presenting for annual exam.     T2DM- recurrent yeast infections taking Jardiance. Notes external irritation only. Always resolves with topical monistat. Started to experience some itching and irritation over the past few days. No discharge or internal itching     SCREENING  Last Pap: 2024 neg/neg  Last Mammo: 2025 birads 2  Last Colonoscopy: 2021 5 year recall      GYN  , no VB     Sexually active: Yes - single partner - male    Hx Abnormal pap: abnormal pap in the 80s; followed by cryo; normal paps since   We  reviewed ASCCP guidelines for Pap testing today.    Denies vaginal discharge, itching, odor, dyspareunia, pelvic pain and vulvar/vaginal symptoms      OB           Complaints: denies   Denies urgency, frequency, hematuria, leakage / change in stream, difficulty urinating.       BREAST  Complaints: denies   Denies: breast lump, breast tenderness, nipple discharge, skin color change, and skin lesion(s)  Personal hx:  hx of excisional biopsy of right breast in ; left breast lumpectomy in        Pertinent Family Hx:   Family hx of breast cancer: maternal aunt; paternal aunt   Family hx of ovarian cancer: no  Family hx of colon cancer: maternal aunt       GENERAL  PMH reviewed/updated and is as below.   Patient does follow with a PCP.    SOCIAL  Smoking: no  Alcohol:social  Drug: no      Past Medical History[1]    Past Surgical History[2]    Current Medications[3]    Allergies[4]    Social History     Socioeconomic History    Marital status: /Civil Union     Spouse name: Not on file    Number of children: Not on file    Years of education: Not on file    Highest education level: Not on file   Occupational History    Not on file   Tobacco Use    Smoking status: Never    Smokeless tobacco: Never   Vaping Use    Vaping status: Never Used   Substance and Sexual Activity    Alcohol use: Yes     Comment: Only drink socially which is not often    Drug use: No    Sexual activity: Yes     Partners: Male     Birth control/protection: Post-menopausal, Male Sterilization   Other Topics Concern    Not on file   Social History Narrative    Not on file     Social Drivers of Health     Financial Resource Strain: Not on file   Food Insecurity: Not on file   Transportation Needs: Not on file   Physical Activity: Not on file   Stress: Not on file   Social Connections: Not on file   Intimate Partner Violence: Not on file   Housing Stability: Not on file         Review of Systems   Constitutional: Negative.    Respiratory:  "Negative.     Cardiovascular: Negative.    Gastrointestinal: Negative.    Genitourinary: Negative.    Musculoskeletal: Negative.    Skin: Negative.    Psychiatric/Behavioral: Negative.            Objective   /80 (BP Location: Left arm, Patient Position: Sitting, Cuff Size: Standard)   Ht 5' 5\" (1.651 m)   Wt 67.2 kg (148 lb 3.2 oz)   BMI 24.66 kg/m²      Physical Exam  Constitutional:       Appearance: Normal appearance.   Genitourinary:      Urethral meatus normal.      No lesions in the vagina.      Genitourinary Comments: Mild erythematous irritative changes noted to external vulva c/w probable yeast       Right Labia: rash.      Right Labia: No tenderness, lesions or skin changes.     Left Labia: rash.      Left Labia: No tenderness, lesions or skin changes.     No inguinal adenopathy present in the right or left side.     No vaginal discharge, tenderness or bleeding.      No vaginal prolapse present.       Right Adnexa: not tender, not full and no mass present.     Left Adnexa: not tender and not full.     No cervical motion tenderness, friability, lesion, polyp or eversion.      Uterus is not enlarged or tender.      No uterine mass detected.  Breasts:     Breasts are symmetrical.      Right: No mass, nipple discharge, skin change or tenderness.      Left: No mass, nipple discharge, skin change or tenderness.   HENT:      Head: Normocephalic and atraumatic.   Neck:      Thyroid: No thyroid mass or thyromegaly.   Pulmonary:      Effort: Pulmonary effort is normal.   Abdominal:      General: Abdomen is flat.      Hernia: There is no hernia in the left inguinal area or right inguinal area.     Musculoskeletal:      Cervical back: Neck supple.      Right lower leg: No edema.      Left lower leg: No edema.   Lymphadenopathy:      Cervical: No cervical adenopathy.      Upper Body:      Right upper body: No supraclavicular or axillary adenopathy.      Left upper body: No supraclavicular or axillary " adenopathy.      Lower Body: No right inguinal adenopathy. No left inguinal adenopathy.     Neurological:      General: No focal deficit present.      Mental Status: She is alert. Mental status is at baseline.     Skin:     General: Skin is warm and dry.     Psychiatric:         Mood and Affect: Mood normal.         Behavior: Behavior normal.         Thought Content: Thought content normal.         Judgment: Judgment normal.   Vitals reviewed.                [1]   Past Medical History:  Diagnosis Date    Abnormal Pap smear of cervix     Colon polyps     Diabetes mellitus (HCC) 08/2021    Taking jardiance 10mg    Diverticulitis of colon 11/23    Fibroadenoma 1993    Forceps delivery     1994 daughter    Headache     History of cardiac monitoring 05/13/2006    Flutter in variety of situations.  She did not feel any of the PVCs or PACs    History of echocardiogram 03/11/2016    EF 55%, mild TR    History of nuclear stress test 04/18/2006    Adenosine MPI:  Normal EKG response.  No ischemia.  Probable soft tissue attenuation.  No definite evidence of scar.    HPV (human papilloma virus) infection     Hyperlipemia     Hypertension     Mild dysplasia of cervix     Palpitations     Shingles 02/2022    SVT (supraventricular tachycardia) ([facility])     Urinary tract infection     Uses oral contraceptives    [2]   Past Surgical History:  Procedure Laterality Date    AV NODE ABLATION  05/10/2016    s/p successful AV iván ablation    BREAST EXCISIONAL BIOPSY Right 1998    BREAST LUMPECTOMY Left 1981    CERVICAL CONE BIOPSY  1988    COLONOSCOPY  2013   [3]   Current Outpatient Medications:     Biotin 2500 MCG CAPS, Take by mouth, Disp: , Rfl:     Calcium Carbonate-Vitamin D 600-400 MG-UNIT per tablet, Take 1 tablet by mouth, Disp: , Rfl:     Cholecalciferol 1000 units tablet, Take 6,000 Units by mouth, Disp: , Rfl:     Cranberry 200 MG CAPS, Take 200 mg by mouth in the morning., Disp: , Rfl:     ezetimibe (ZETIA) 10 mg tablet, Take  1 tablet (10 mg total) by mouth daily, Disp: 90 tablet, Rfl: 1    Jardiance 25 MG TABS, TAKE 1 TABLET (25 MG TOTAL) BY MOUTH DAILY., Disp: 30 tablet, Rfl: 5    lisinopril (ZESTRIL) 10 mg tablet, TAKE 1 TABLET BY MOUTH EVERY DAY, Disp: 90 tablet, Rfl: 3    metoprolol succinate (TOPROL-XL) 50 mg 24 hr tablet, TAKE 1 TABLET BY MOUTH EVERY DAY, Disp: 90 tablet, Rfl: 4    nystatin (MYCOSTATIN) ointment, Apply topically 2 (two) times a day for 14 days, Disp: 30 g, Rfl: 1    rosuvastatin (CRESTOR) 40 MG tablet, TAKE 1 TABLET BY MOUTH EVERY DAY, Disp: 90 tablet, Rfl: 1    oxybutynin (DITROPAN) 5 mg tablet, Take 1 tablet (5 mg total) by mouth 3 (three) times a day as needed (bladder pain), Disp: 90 tablet, Rfl: 5  [4] No Known Allergies

## 2025-06-19 ENCOUNTER — TELEPHONE (OUTPATIENT)
Age: 65
End: 2025-06-19

## 2025-06-19 NOTE — TELEPHONE ENCOUNTER
Patient called and asked if she needed a ABUS (breast ultrasound) this year, forgot to ask Maia Bauer at her visit yesterday. Please advise.

## 2025-07-30 DIAGNOSIS — E78.2 MIXED HYPERLIPIDEMIA: ICD-10-CM

## 2025-07-30 RX ORDER — ROSUVASTATIN CALCIUM 40 MG/1
40 TABLET, COATED ORAL DAILY
Qty: 30 TABLET | Refills: 0 | Status: SHIPPED | OUTPATIENT
Start: 2025-07-30

## 2025-08-01 DIAGNOSIS — E11.9 TYPE 2 DIABETES MELLITUS WITHOUT COMPLICATION, WITHOUT LONG-TERM CURRENT USE OF INSULIN (HCC): ICD-10-CM

## 2025-08-01 RX ORDER — EMPAGLIFLOZIN 25 MG/1
25 TABLET, FILM COATED ORAL DAILY
Qty: 30 TABLET | Refills: 0 | Status: SHIPPED | OUTPATIENT
Start: 2025-08-01